# Patient Record
Sex: FEMALE | Race: WHITE | Employment: FULL TIME | URBAN - METROPOLITAN AREA
[De-identification: names, ages, dates, MRNs, and addresses within clinical notes are randomized per-mention and may not be internally consistent; named-entity substitution may affect disease eponyms.]

---

## 2017-03-22 ENCOUNTER — LAB ENCOUNTER (OUTPATIENT)
Dept: LAB | Age: 52
End: 2017-03-22
Attending: FAMILY MEDICINE
Payer: COMMERCIAL

## 2017-03-22 ENCOUNTER — OFFICE VISIT (OUTPATIENT)
Dept: FAMILY MEDICINE CLINIC | Facility: CLINIC | Age: 52
End: 2017-03-22

## 2017-03-22 VITALS
DIASTOLIC BLOOD PRESSURE: 86 MMHG | WEIGHT: 162 LBS | RESPIRATION RATE: 16 BRPM | HEART RATE: 64 BPM | SYSTOLIC BLOOD PRESSURE: 126 MMHG | BODY MASS INDEX: 25.43 KG/M2 | OXYGEN SATURATION: 99 % | TEMPERATURE: 98 F | HEIGHT: 67 IN

## 2017-03-22 DIAGNOSIS — Z00.00 ANNUAL PHYSICAL EXAM: ICD-10-CM

## 2017-03-22 DIAGNOSIS — R53.83 TIRED: ICD-10-CM

## 2017-03-22 DIAGNOSIS — Z00.00 ANNUAL PHYSICAL EXAM: Primary | ICD-10-CM

## 2017-03-22 DIAGNOSIS — F43.9 STRESS: ICD-10-CM

## 2017-03-22 DIAGNOSIS — E78.00 PURE HYPERCHOLESTEROLEMIA: ICD-10-CM

## 2017-03-22 DIAGNOSIS — Z12.11 COLON CANCER SCREENING: ICD-10-CM

## 2017-03-22 DIAGNOSIS — G47.00 INSOMNIA, UNSPECIFIED TYPE: ICD-10-CM

## 2017-03-22 LAB
25-HYDROXYVITAMIN D (TOTAL): 30.3 NG/ML (ref 30–100)
ALBUMIN SERPL-MCNC: 3.8 G/DL (ref 3.5–4.8)
ALP LIVER SERPL-CCNC: 45 U/L (ref 39–100)
ALT SERPL-CCNC: 15 U/L (ref 14–54)
AST SERPL-CCNC: 12 U/L (ref 15–41)
BASOPHILS # BLD AUTO: 0.06 X10(3) UL (ref 0–0.1)
BASOPHILS NFR BLD AUTO: 1.1 %
BILIRUB SERPL-MCNC: 1.1 MG/DL (ref 0.1–2)
BUN BLD-MCNC: 11 MG/DL (ref 8–20)
CALCIUM BLD-MCNC: 8.8 MG/DL (ref 8.3–10.3)
CHLORIDE: 104 MMOL/L (ref 101–111)
CHOLEST SMN-MCNC: 276 MG/DL (ref ?–200)
CO2: 29 MMOL/L (ref 22–32)
CREAT BLD-MCNC: 0.7 MG/DL (ref 0.55–1.02)
EOSINOPHIL # BLD AUTO: 0.14 X10(3) UL (ref 0–0.3)
EOSINOPHIL NFR BLD AUTO: 2.5 %
ERYTHROCYTE [DISTWIDTH] IN BLOOD BY AUTOMATED COUNT: 13.8 % (ref 11.5–16)
GLUCOSE BLD-MCNC: 88 MG/DL (ref 70–99)
HCT VFR BLD AUTO: 37.7 % (ref 34–50)
HDLC SERPL-MCNC: 70 MG/DL (ref 45–?)
HDLC SERPL: 3.94 {RATIO} (ref ?–4.44)
HGB BLD-MCNC: 12.4 G/DL (ref 12–16)
IMMATURE GRANULOCYTE COUNT: 0 X10(3) UL (ref 0–1)
IMMATURE GRANULOCYTE RATIO %: 0 %
LDLC SERPL CALC-MCNC: 193 MG/DL (ref ?–130)
LYMPHOCYTES # BLD AUTO: 1.44 X10(3) UL (ref 0.9–4)
LYMPHOCYTES NFR BLD AUTO: 25.7 %
M PROTEIN MFR SERPL ELPH: 7.5 G/DL (ref 6.1–8.3)
MCH RBC QN AUTO: 31.6 PG (ref 27–33.2)
MCHC RBC AUTO-ENTMCNC: 32.9 G/DL (ref 31–37)
MCV RBC AUTO: 96.2 FL (ref 81–100)
MONOCYTES # BLD AUTO: 0.59 X10(3) UL (ref 0.1–0.6)
MONOCYTES NFR BLD AUTO: 10.5 %
NEUTROPHIL ABS PRELIM: 3.38 X10 (3) UL (ref 1.3–6.7)
NEUTROPHILS # BLD AUTO: 3.38 X10(3) UL (ref 1.3–6.7)
NEUTROPHILS NFR BLD AUTO: 60.2 %
NONHDLC SERPL-MCNC: 206 MG/DL (ref ?–130)
PLATELET # BLD AUTO: 271 10(3)UL (ref 150–450)
POTASSIUM SERPL-SCNC: 4.5 MMOL/L (ref 3.6–5.1)
RBC # BLD AUTO: 3.92 X10(6)UL (ref 3.8–5.1)
RED CELL DISTRIBUTION WIDTH-SD: 48.6 FL (ref 35.1–46.3)
SODIUM SERPL-SCNC: 139 MMOL/L (ref 136–144)
TRIGLYCERIDES: 64 MG/DL (ref ?–150)
TSI SER-ACNC: 2.78 MIU/ML (ref 0.35–5.5)
VLDL: 13 MG/DL (ref 5–40)
WBC # BLD AUTO: 5.6 X10(3) UL (ref 4–13)

## 2017-03-22 PROCEDURE — 82306 VITAMIN D 25 HYDROXY: CPT

## 2017-03-22 PROCEDURE — 85025 COMPLETE CBC W/AUTO DIFF WBC: CPT

## 2017-03-22 PROCEDURE — 80053 COMPREHEN METABOLIC PANEL: CPT

## 2017-03-22 PROCEDURE — 80061 LIPID PANEL: CPT

## 2017-03-22 PROCEDURE — 84443 ASSAY THYROID STIM HORMONE: CPT

## 2017-03-22 PROCEDURE — 36415 COLL VENOUS BLD VENIPUNCTURE: CPT

## 2017-03-22 PROCEDURE — 99396 PREV VISIT EST AGE 40-64: CPT | Performed by: FAMILY MEDICINE

## 2017-03-22 NOTE — H&P
Juve Underwood is a 50year old female who presents for a well woman physical exam:       Patient complains of difficulty staying asleep, HLD, fatigue, and stress. Pt denied snoring. Denied depression.   Does not want to take any medication for all t pain  EXT: denies LE edema  NEURO: denies frequent headaches or dizziness  PSYCH: denies change in mood    EXAM:   /86 mmHg  Pulse 64  Temp(Src) 98 °F (36.7 °C) (Temporal)  Resp 16  Ht 67\"  Wt 162 lb  BMI 25.37 kg/m2  SpO2 99%  Body mass index is 25

## 2017-05-09 PROCEDURE — 88175 CYTOPATH C/V AUTO FLUID REDO: CPT | Performed by: OBSTETRICS & GYNECOLOGY

## 2017-05-09 PROCEDURE — 87624 HPV HI-RISK TYP POOLED RSLT: CPT | Performed by: OBSTETRICS & GYNECOLOGY

## 2018-02-01 ENCOUNTER — LAB ENCOUNTER (OUTPATIENT)
Dept: LAB | Age: 53
End: 2018-02-01
Attending: FAMILY MEDICINE
Payer: COMMERCIAL

## 2018-02-01 DIAGNOSIS — Z00.00 LABORATORY EXAMINATION ORDERED AS PART OF A COMPLETE PHYSICAL EXAMINATION: ICD-10-CM

## 2018-02-01 DIAGNOSIS — E53.8 B12 DEFICIENCY: ICD-10-CM

## 2018-02-01 LAB
25-HYDROXYVITAMIN D (TOTAL): 44.2 NG/ML (ref 30–100)
ALBUMIN SERPL-MCNC: 3.9 G/DL (ref 3.5–4.8)
ALP LIVER SERPL-CCNC: 49 U/L (ref 39–100)
ALT SERPL-CCNC: 16 U/L (ref 14–54)
AST SERPL-CCNC: 11 U/L (ref 15–41)
BASOPHILS # BLD AUTO: 0.06 X10(3) UL (ref 0–0.1)
BASOPHILS NFR BLD AUTO: 0.8 %
BILIRUB SERPL-MCNC: 0.8 MG/DL (ref 0.1–2)
BUN BLD-MCNC: 11 MG/DL (ref 8–20)
CALCIUM BLD-MCNC: 8.6 MG/DL (ref 8.3–10.3)
CHLORIDE: 104 MMOL/L (ref 101–111)
CHOLEST SMN-MCNC: 225 MG/DL (ref ?–200)
CO2: 28 MMOL/L (ref 22–32)
CREAT BLD-MCNC: 0.71 MG/DL (ref 0.55–1.02)
DEPRECATED HBV CORE AB SER IA-ACNC: 13.1 NG/ML (ref 10–291)
EOSINOPHIL # BLD AUTO: 0.1 X10(3) UL (ref 0–0.3)
EOSINOPHIL NFR BLD AUTO: 1.3 %
ERYTHROCYTE [DISTWIDTH] IN BLOOD BY AUTOMATED COUNT: 13.6 % (ref 11.5–16)
GLUCOSE BLD-MCNC: 96 MG/DL (ref 70–99)
HAV AB SERPL IA-ACNC: 1138 PG/ML (ref 193–986)
HCT VFR BLD AUTO: 38.7 % (ref 34–50)
HDLC SERPL-MCNC: 75 MG/DL (ref 45–?)
HDLC SERPL: 3 {RATIO} (ref ?–4.44)
HGB BLD-MCNC: 12.9 G/DL (ref 12–16)
IMMATURE GRANULOCYTE COUNT: 0.02 X10(3) UL (ref 0–1)
IMMATURE GRANULOCYTE RATIO %: 0.3 %
LDLC SERPL CALC-MCNC: 135 MG/DL (ref ?–130)
LYMPHOCYTES # BLD AUTO: 1.38 X10(3) UL (ref 0.9–4)
LYMPHOCYTES NFR BLD AUTO: 17.4 %
M PROTEIN MFR SERPL ELPH: 7.8 G/DL (ref 6.1–8.3)
MCH RBC QN AUTO: 30.9 PG (ref 27–33.2)
MCHC RBC AUTO-ENTMCNC: 33.3 G/DL (ref 31–37)
MCV RBC AUTO: 92.8 FL (ref 81–100)
MONOCYTES # BLD AUTO: 0.7 X10(3) UL (ref 0.1–0.6)
MONOCYTES NFR BLD AUTO: 8.8 %
NEUTROPHIL ABS PRELIM: 5.69 X10 (3) UL (ref 1.3–6.7)
NEUTROPHILS # BLD AUTO: 5.69 X10(3) UL (ref 1.3–6.7)
NEUTROPHILS NFR BLD AUTO: 71.4 %
NONHDLC SERPL-MCNC: 150 MG/DL (ref ?–130)
PLATELET # BLD AUTO: 288 10(3)UL (ref 150–450)
POTASSIUM SERPL-SCNC: 4 MMOL/L (ref 3.6–5.1)
RBC # BLD AUTO: 4.17 X10(6)UL (ref 3.8–5.1)
RED CELL DISTRIBUTION WIDTH-SD: 46.1 FL (ref 35.1–46.3)
SODIUM SERPL-SCNC: 139 MMOL/L (ref 136–144)
TRIGL SERPL-MCNC: 73 MG/DL (ref ?–150)
TSI SER-ACNC: 2.28 MIU/ML (ref 0.35–5.5)
VLDLC SERPL CALC-MCNC: 15 MG/DL (ref 5–40)
WBC # BLD AUTO: 8 X10(3) UL (ref 4–13)

## 2018-02-01 PROCEDURE — 80053 COMPREHEN METABOLIC PANEL: CPT

## 2018-02-01 PROCEDURE — 85025 COMPLETE CBC W/AUTO DIFF WBC: CPT

## 2018-02-01 PROCEDURE — 82306 VITAMIN D 25 HYDROXY: CPT

## 2018-02-01 PROCEDURE — 82728 ASSAY OF FERRITIN: CPT

## 2018-02-01 PROCEDURE — 84443 ASSAY THYROID STIM HORMONE: CPT

## 2018-02-01 PROCEDURE — 36415 COLL VENOUS BLD VENIPUNCTURE: CPT

## 2018-02-01 PROCEDURE — 82607 VITAMIN B-12: CPT

## 2018-02-01 PROCEDURE — 80061 LIPID PANEL: CPT

## 2018-02-01 NOTE — PROGRESS NOTES
HPI:    Patient ID: Caitlin Harrison is a 50year old female. Anxiety   This is a chronic problem. The current episode started more than 1 month ago. The problem occurs daily. The problem has been unchanged.  Associated symptoms comments: Less motivat PLATELET; Future  - COMP METABOLIC PANEL (14); Future  - TSH W REFLEX TO FREE T4; Future  - VITAMIN D, 25-HYDROXY; Future  - FERRITIN; Future  - VITAMIN B12; Future    3.  B12 deficiency  - VITAMIN B12; Future        Orders Placed This Encounter      Lipid

## 2018-05-30 PROBLEM — D50.0 IRON DEFICIENCY ANEMIA DUE TO CHRONIC BLOOD LOSS: Status: ACTIVE | Noted: 2018-05-30

## 2018-05-30 PROBLEM — N92.4 PERIMENOPAUSAL MENORRHAGIA: Status: ACTIVE | Noted: 2018-05-30

## 2018-05-30 PROBLEM — N93.8 DYSFUNCTIONAL UTERINE BLEEDING: Status: ACTIVE | Noted: 2018-05-30

## 2018-05-31 RX ORDER — ASCORBIC ACID 500 MG
500 TABLET ORAL 2 TIMES DAILY
COMMUNITY
End: 2021-06-09

## 2018-05-31 RX ORDER — MELATONIN
325 2 TIMES DAILY WITH MEALS
COMMUNITY
End: 2019-05-21

## 2018-06-04 ENCOUNTER — HOSPITAL ENCOUNTER (OUTPATIENT)
Facility: HOSPITAL | Age: 53
Setting detail: HOSPITAL OUTPATIENT SURGERY
Discharge: HOME | End: 2018-06-04
Attending: OBSTETRICS & GYNECOLOGY | Admitting: OBSTETRICS & GYNECOLOGY
Payer: COMMERCIAL

## 2018-06-04 ENCOUNTER — SURGERY (OUTPATIENT)
Age: 53
End: 2018-06-04

## 2018-06-04 VITALS
BODY MASS INDEX: 22.7 KG/M2 | SYSTOLIC BLOOD PRESSURE: 119 MMHG | DIASTOLIC BLOOD PRESSURE: 80 MMHG | WEIGHT: 144.63 LBS | RESPIRATION RATE: 16 BRPM | HEART RATE: 59 BPM | OXYGEN SATURATION: 100 % | TEMPERATURE: 98 F | HEIGHT: 67 IN

## 2018-06-04 DIAGNOSIS — N93.8 DYSFUNCTIONAL UTERINE BLEEDING: ICD-10-CM

## 2018-06-04 DIAGNOSIS — D50.0 IRON DEFICIENCY ANEMIA DUE TO CHRONIC BLOOD LOSS: ICD-10-CM

## 2018-06-04 DIAGNOSIS — N92.4 EXCESSIVE BLEEDING IN PREMENOPAUSAL PERIOD: ICD-10-CM

## 2018-06-04 DIAGNOSIS — N92.4 PERIMENOPAUSAL MENORRHAGIA: ICD-10-CM

## 2018-06-04 DIAGNOSIS — D64.9 ANEMIA: Primary | ICD-10-CM

## 2018-06-04 PROCEDURE — 0UDB7ZZ EXTRACTION OF ENDOMETRIUM, VIA NATURAL OR ARTIFICIAL OPENING: ICD-10-PCS | Performed by: OBSTETRICS & GYNECOLOGY

## 2018-06-04 PROCEDURE — 88305 TISSUE EXAM BY PATHOLOGIST: CPT | Performed by: OBSTETRICS & GYNECOLOGY

## 2018-06-04 PROCEDURE — 81025 URINE PREGNANCY TEST: CPT | Performed by: OBSTETRICS & GYNECOLOGY

## 2018-06-04 RX ORDER — ONDANSETRON 2 MG/ML
4 INJECTION INTRAMUSCULAR; INTRAVENOUS AS NEEDED
Status: DISCONTINUED | OUTPATIENT
Start: 2018-06-04 | End: 2018-06-04

## 2018-06-04 RX ORDER — ACETAMINOPHEN 500 MG
1000 TABLET ORAL EVERY 6 HOURS PRN
COMMUNITY
End: 2018-07-03

## 2018-06-04 RX ORDER — SODIUM CHLORIDE, SODIUM LACTATE, POTASSIUM CHLORIDE, CALCIUM CHLORIDE 600; 310; 30; 20 MG/100ML; MG/100ML; MG/100ML; MG/100ML
INJECTION, SOLUTION INTRAVENOUS CONTINUOUS
Status: DISCONTINUED | OUTPATIENT
Start: 2018-06-04 | End: 2018-06-04

## 2018-06-04 RX ORDER — MEPERIDINE HYDROCHLORIDE 25 MG/ML
12.5 INJECTION INTRAMUSCULAR; INTRAVENOUS; SUBCUTANEOUS AS NEEDED
Status: DISCONTINUED | OUTPATIENT
Start: 2018-06-04 | End: 2018-06-04

## 2018-06-04 RX ORDER — NALOXONE HYDROCHLORIDE 0.4 MG/ML
80 INJECTION, SOLUTION INTRAMUSCULAR; INTRAVENOUS; SUBCUTANEOUS AS NEEDED
Status: DISCONTINUED | OUTPATIENT
Start: 2018-06-04 | End: 2018-06-04

## 2018-06-04 RX ORDER — ACETAMINOPHEN 500 MG
1000 TABLET ORAL ONCE AS NEEDED
Status: DISCONTINUED | OUTPATIENT
Start: 2018-06-04 | End: 2018-06-04

## 2018-06-04 RX ORDER — IBUPROFEN 600 MG/1
600 TABLET ORAL EVERY 8 HOURS PRN
Qty: 10 TABLET | Refills: 0 | Status: SHIPPED | OUTPATIENT
Start: 2018-06-04 | End: 2019-09-12 | Stop reason: ALTCHOICE

## 2018-06-04 RX ORDER — ACETAMINOPHEN 500 MG
1000 TABLET ORAL ONCE
Status: DISCONTINUED | OUTPATIENT
Start: 2018-06-04 | End: 2018-06-04 | Stop reason: HOSPADM

## 2018-06-04 RX ORDER — METOCLOPRAMIDE HYDROCHLORIDE 5 MG/ML
10 INJECTION INTRAMUSCULAR; INTRAVENOUS AS NEEDED
Status: DISCONTINUED | OUTPATIENT
Start: 2018-06-04 | End: 2018-06-04

## 2018-06-04 RX ORDER — IBUPROFEN 200 MG
600 TABLET ORAL ONCE AS NEEDED
Status: DISCONTINUED | OUTPATIENT
Start: 2018-06-04 | End: 2018-06-04

## 2018-06-04 RX ORDER — HYDROMORPHONE HYDROCHLORIDE 1 MG/ML
0.4 INJECTION, SOLUTION INTRAMUSCULAR; INTRAVENOUS; SUBCUTANEOUS EVERY 5 MIN PRN
Status: DISCONTINUED | OUTPATIENT
Start: 2018-06-04 | End: 2018-06-04

## 2018-06-04 RX ORDER — MIDAZOLAM HYDROCHLORIDE 1 MG/ML
1 INJECTION INTRAMUSCULAR; INTRAVENOUS EVERY 5 MIN PRN
Status: DISCONTINUED | OUTPATIENT
Start: 2018-06-04 | End: 2018-06-04

## 2018-06-04 NOTE — BRIEF OP NOTE
Pre-Operative Diagnosis: Excessive bleeding in premenopausal period [N92.4]     Post-Operative Diagnosis: Excessive bleeding in premenopausal period [N92.4]      Procedure Performed:   Procedure(s):  DILATION AND CURETTAGE,    Surgeon(s) and Role:     * Je

## 2018-06-04 NOTE — H&P
Valeria Basurto is a 48year old  who presents for bleeding that started  - had missed a month - bleeding ever since - started the provera on 21 - 10 mg - there bleeding finally started to decrease yesterday - changing tampon every couople denies depression or anxiety, mood is good  HEMATOLOGIC: denies history of anemia  ENDOCRINE: denies thyroid history, cold/heat intolerance  ALLERGIC: denies allergy symptoms     EXAM:      VITALS: /68   Pulse 72   Ht 5' 7\" (1.702 m)   Wt 143 lb 12.

## 2018-06-04 NOTE — OR NURSING
Dr. Sybil Cochran did not respond to page. Spoke with Idania Rowe in Dr. Talbert Devoid office regarding Ibuprofen RX that was not on chart for pt.   Idania Rowe spoke with RN in office who was going to phone in [employer] but pt refused and said she just wants to take over the counter ib

## 2018-06-04 NOTE — OPERATIVE REPORT
[address]    PATIENT'S NAME: Tian Herring   ATTENDING PHYSICIAN: Leila Burr M.D. OPERATING PHYSICIAN: Leila Burr M.D.    PATIENT ACCOUNT#:   [de-identified]    LOCATION:  [address] [place] [address] [address] #:   [id number]

## 2019-03-12 ENCOUNTER — APPOINTMENT (OUTPATIENT)
Dept: CT IMAGING | Age: 54
End: 2019-03-12
Attending: EMERGENCY MEDICINE
Payer: COMMERCIAL

## 2019-03-12 ENCOUNTER — HOSPITAL ENCOUNTER (OUTPATIENT)
Age: 54
Discharge: HOME | End: 2019-03-12
Attending: EMERGENCY MEDICINE
Payer: COMMERCIAL

## 2019-03-12 VITALS
SYSTOLIC BLOOD PRESSURE: 131 MMHG | TEMPERATURE: 99 F | RESPIRATION RATE: 16 BRPM | DIASTOLIC BLOOD PRESSURE: 81 MMHG | HEART RATE: 57 BPM | OXYGEN SATURATION: 100 %

## 2019-03-12 DIAGNOSIS — G44.209 TENSION HEADACHE: Primary | ICD-10-CM

## 2019-03-12 LAB
#MXD IC: 0.9 X10ˆ3/UL (ref 0.1–1)
HCT VFR BLD AUTO: 40 % (ref 35–48)
HGB BLD-MCNC: 13.6 G/DL (ref 12–16)
LYMPHOCYTES # BLD AUTO: 1.7 X10ˆ3/UL (ref 1–4)
LYMPHOCYTES NFR BLD AUTO: 18.3 %
MCH RBC QN AUTO: 32 PG (ref 26–34)
MCHC RBC AUTO-ENTMCNC: 34 G/DL (ref 31–37)
MCV RBC AUTO: 94.1 FL (ref 80–100)
MIXED CELL %: 9.9 %
NEUTROPHILS # BLD AUTO: 6.7 X10ˆ3/UL (ref 1.5–7.7)
NEUTROPHILS NFR BLD AUTO: 71.8 %
PLATELET # BLD AUTO: 269 X10ˆ3/UL (ref 150–450)
POCT BILIRUBIN URINE: NEGATIVE
POCT BLOOD URINE: NEGATIVE
POCT GLUCOSE URINE: NEGATIVE MG/DL
POCT KETONE URINE: NEGATIVE MG/DL
POCT LEUKOCYTE ESTERASE URINE: NEGATIVE
POCT NITRITE URINE: NEGATIVE
POCT PH URINE: 6 (ref 5–8)
POCT PROTEIN URINE: NEGATIVE MG/DL
POCT SPECIFIC GRAVITY URINE: 1.01
POCT URINE CLARITY: CLEAR
POCT URINE COLOR: YELLOW
POCT URINE PREGNANCY: NEGATIVE
POCT UROBILINOGEN URINE: 0.2 MG/DL
RBC # BLD AUTO: 4.25 X10ˆ6/UL (ref 3.8–5.3)
WBC # BLD AUTO: 9.3 X10ˆ3/UL (ref 4–11)

## 2019-03-12 PROCEDURE — 81002 URINALYSIS NONAUTO W/O SCOPE: CPT | Performed by: EMERGENCY MEDICINE

## 2019-03-12 PROCEDURE — 99214 OFFICE O/P EST MOD 30 MIN: CPT

## 2019-03-12 PROCEDURE — 85025 COMPLETE CBC W/AUTO DIFF WBC: CPT | Performed by: EMERGENCY MEDICINE

## 2019-03-12 PROCEDURE — 81025 URINE PREGNANCY TEST: CPT | Performed by: EMERGENCY MEDICINE

## 2019-03-12 PROCEDURE — 70450 CT HEAD/BRAIN W/O DYE: CPT | Performed by: EMERGENCY MEDICINE

## 2019-03-12 PROCEDURE — 99204 OFFICE O/P NEW MOD 45 MIN: CPT

## 2019-03-12 RX ORDER — BUTALBITAL, ACETAMINOPHEN AND CAFFEINE 50; 325; 40 MG/1; MG/1; MG/1
1-2 TABLET ORAL EVERY 6 HOURS PRN
Qty: 10 TABLET | Refills: 0 | Status: SHIPPED | OUTPATIENT
Start: 2019-03-12 | End: 2019-03-19

## 2019-03-12 RX ORDER — BUTALBITAL, ACETAMINOPHEN AND CAFFEINE 50; 325; 40 MG/1; MG/1; MG/1
1-2 TABLET ORAL EVERY 4 HOURS PRN
Qty: 10 TABLET | Refills: 0 | Status: SHIPPED | OUTPATIENT
Start: 2019-03-12 | End: 2019-05-31

## 2019-03-12 NOTE — ED PROVIDER NOTES
Patient Seen in: [facility]    History   Patient presents with:  Headache (neurologic)    Stated Complaint: migraines     HPI    Headaches for the past few weeks, pretty consistently for the past few weeks, with varying intensit department bed she is in no acute distress her HEENT exam is within normal limits and her neck is supple her lungs are clear to auscultation her heart has regular rate and rhythm.   Her pupils are equal round and reactive to light her facial symmetry is nor [id number]  [phone]              Medications Prescribed:  Current Discharge Medication List    START taking these medications    !! Butalbital-APAP-Caffeine -40 MG Oral Tab  Take 1-2 tablets by mouth every 4 (four) hours as needed for Pain.   Qty: 10

## 2019-05-21 ENCOUNTER — OFFICE VISIT (OUTPATIENT)
Dept: FAMILY MEDICINE CLINIC | Facility: CLINIC | Age: 54
End: 2019-05-21
Payer: COMMERCIAL

## 2019-05-21 ENCOUNTER — APPOINTMENT (OUTPATIENT)
Dept: LAB | Age: 54
End: 2019-05-21
Attending: FAMILY MEDICINE
Payer: COMMERCIAL

## 2019-05-21 VITALS
HEART RATE: 74 BPM | OXYGEN SATURATION: 98 % | HEIGHT: 67 IN | DIASTOLIC BLOOD PRESSURE: 72 MMHG | RESPIRATION RATE: 20 BRPM | SYSTOLIC BLOOD PRESSURE: 110 MMHG | BODY MASS INDEX: 25.27 KG/M2 | WEIGHT: 161 LBS | TEMPERATURE: 98 F

## 2019-05-21 DIAGNOSIS — R06.00 EXERTIONAL DYSPNEA: ICD-10-CM

## 2019-05-21 DIAGNOSIS — R07.89 ATYPICAL CHEST PAIN: ICD-10-CM

## 2019-05-21 DIAGNOSIS — E61.1 IRON DEFICIENCY: ICD-10-CM

## 2019-05-21 DIAGNOSIS — E55.9 VITAMIN D DEFICIENCY: Primary | ICD-10-CM

## 2019-05-21 DIAGNOSIS — Z00.00 ANNUAL PHYSICAL EXAM: ICD-10-CM

## 2019-05-21 DIAGNOSIS — F32.A DEPRESSION, UNSPECIFIED DEPRESSION TYPE: ICD-10-CM

## 2019-05-21 PROCEDURE — 93005 ELECTROCARDIOGRAM TRACING: CPT

## 2019-05-21 PROCEDURE — 99212 OFFICE O/P EST SF 10 MIN: CPT | Performed by: FAMILY MEDICINE

## 2019-05-21 PROCEDURE — 99396 PREV VISIT EST AGE 40-64: CPT | Performed by: FAMILY MEDICINE

## 2019-05-21 PROCEDURE — 93010 ELECTROCARDIOGRAM REPORT: CPT | Performed by: INTERNAL MEDICINE

## 2019-05-21 NOTE — H&P
Tammy Robles is a 48year old female who presents for a well woman physical exam:     Headache    This is a chronic problem. The current episode started more than 1 month ago.  The problem occurs intermittently (pt states she gets them 2x per week and th tablet Rfl: 0   Vitamin C 500 MG Oral Tab Take 500 mg by mouth 2 (two) times daily.  Disp:  Rfl:       Past Medical History:   Diagnosis Date   • Blood disorder     ANEMIC   • Visual impairment     CONTACTS      Past Surgical History:   Procedure Laterality dyspareunia; denies dryness  MS: denies back pain  EXT: denies LE edema  NEURO: denies frequent headaches or dizziness  PSYCH: denies change in mood    EXAM:   /72   Pulse 74   Temp 97.7 °F (36.5 °C) (Other)   Resp 20   Ht 67\"   Wt 161 lb   SpO2 98%

## 2019-05-25 ENCOUNTER — LAB ENCOUNTER (OUTPATIENT)
Dept: LAB | Age: 54
End: 2019-05-25
Attending: FAMILY MEDICINE
Payer: COMMERCIAL

## 2019-05-25 DIAGNOSIS — D64.9 ANEMIA: ICD-10-CM

## 2019-05-25 DIAGNOSIS — N93.8 DYSFUNCTIONAL UTERINE BLEEDING: ICD-10-CM

## 2019-05-25 DIAGNOSIS — Z00.00 ANNUAL PHYSICAL EXAM: ICD-10-CM

## 2019-05-25 DIAGNOSIS — N92.4 PERIMENOPAUSAL MENORRHAGIA: ICD-10-CM

## 2019-05-25 DIAGNOSIS — N92.4 EXCESSIVE BLEEDING IN PREMENOPAUSAL PERIOD: ICD-10-CM

## 2019-05-25 DIAGNOSIS — E61.1 IRON DEFICIENCY: ICD-10-CM

## 2019-05-25 DIAGNOSIS — D50.0 IRON DEFICIENCY ANEMIA DUE TO CHRONIC BLOOD LOSS: ICD-10-CM

## 2019-05-25 PROCEDURE — 36415 COLL VENOUS BLD VENIPUNCTURE: CPT

## 2019-05-25 PROCEDURE — 80061 LIPID PANEL: CPT

## 2019-05-25 PROCEDURE — 82306 VITAMIN D 25 HYDROXY: CPT

## 2019-05-25 PROCEDURE — 82728 ASSAY OF FERRITIN: CPT

## 2019-05-25 PROCEDURE — 85025 COMPLETE CBC W/AUTO DIFF WBC: CPT

## 2019-05-25 PROCEDURE — 80053 COMPREHEN METABOLIC PANEL: CPT

## 2019-05-25 PROCEDURE — 84443 ASSAY THYROID STIM HORMONE: CPT

## 2019-05-28 ENCOUNTER — HOSPITAL ENCOUNTER (OUTPATIENT)
Dept: CV DIAGNOSTICS | Age: 54
Discharge: HOME | End: 2019-05-28
Attending: FAMILY MEDICINE
Payer: COMMERCIAL

## 2019-05-28 DIAGNOSIS — R07.89 ATYPICAL CHEST PAIN: ICD-10-CM

## 2019-05-28 DIAGNOSIS — R06.00 EXERTIONAL DYSPNEA: ICD-10-CM

## 2019-05-28 PROCEDURE — 93017 CV STRESS TEST TRACING ONLY: CPT | Performed by: FAMILY MEDICINE

## 2019-05-28 PROCEDURE — 93018 CV STRESS TEST I&R ONLY: CPT | Performed by: FAMILY MEDICINE

## 2019-05-29 ENCOUNTER — TELEPHONE (OUTPATIENT)
Dept: FAMILY MEDICINE CLINIC | Facility: CLINIC | Age: 54
End: 2019-05-29

## 2019-05-29 DIAGNOSIS — E78.5 HYPERLIPIDEMIA, UNSPECIFIED HYPERLIPIDEMIA TYPE: Primary | ICD-10-CM

## 2019-05-29 NOTE — TELEPHONE ENCOUNTER
Blood test showing really high cholesterol. We can consider starting medication or she redouble efforts with diet, exercise, fiber and then we recheck in 3 months. Stress test results not back yet.

## 2019-05-29 NOTE — TELEPHONE ENCOUNTER
Called patient - verified patient's name and  - informed pt of doctor's note - patient verbalized understanding, states she has colonoscopy on Friday and needs to be cleared. Called Cardiac testing and they will enter stress echo's report.     Please

## 2019-05-29 NOTE — TELEPHONE ENCOUNTER
Notes recorded by Lisa Stoddard RN on 5/29/2019 at 2:34 PM CDT  Pt informed . Dr Jj Elmore office also notified. Copy of stress test faxed to [phone].  ------    Notes recorded by Zeus Rivas DO on 5/29/2019 at 2:17 PM CDT  Looks normal.  Cleared f

## 2019-05-29 NOTE — TELEPHONE ENCOUNTER
Pt looking for results of labs and stress test.RN informed pt stress test has not been resulted. Please advise on labs.

## 2019-05-30 ENCOUNTER — TELEPHONE (OUTPATIENT)
Dept: FAMILY MEDICINE CLINIC | Facility: CLINIC | Age: 54
End: 2019-05-30

## 2019-05-30 NOTE — TELEPHONE ENCOUNTER
Pt wants to know what she can take for a migraine - she's having a colonoscopy tomorrow and concerned about taking medications

## 2019-05-31 NOTE — TELEPHONE ENCOUNTER
Seems too late now, but she can take the butalbital she was given earlier later tonight. She can not take it 12 hrs before the colonscopy or 6 hrs after.   Does she need another px?

## 2019-05-31 NOTE — TELEPHONE ENCOUNTER
Pt informed. States she had to cancel colonoscopy. Pt only has 1 Butalbital left and would like a refill. Please advise. If ok phone into [facility] [address].

## 2019-06-03 RX ORDER — BUTALBITAL, ACETAMINOPHEN AND CAFFEINE 50; 325; 40 MG/1; MG/1; MG/1
1-2 TABLET ORAL EVERY 4 HOURS PRN
Qty: 20 TABLET | Refills: 0 | Status: SHIPPED | OUTPATIENT
Start: 2019-06-03 | End: 2019-06-10

## 2019-07-24 RX ORDER — BUTALBITAL, ACETAMINOPHEN AND CAFFEINE 50; 325; 40 MG/1; MG/1; MG/1
TABLET ORAL
Qty: 20 TABLET | Refills: 0 | Status: SHIPPED | OUTPATIENT
Start: 2019-07-24 | End: 2019-08-22

## 2019-08-24 ENCOUNTER — TELEPHONE (OUTPATIENT)
Dept: FAMILY MEDICINE CLINIC | Facility: CLINIC | Age: 54
End: 2019-08-24

## 2019-08-24 RX ORDER — BUTALBITAL, ACETAMINOPHEN AND CAFFEINE 50; 325; 40 MG/1; MG/1; MG/1
TABLET ORAL
Qty: 20 TABLET | Refills: 0 | Status: SHIPPED | OUTPATIENT
Start: 2019-08-24 | End: 2019-09-27

## 2019-08-24 NOTE — TELEPHONE ENCOUNTER
Spoke with PT about refilling her medication. She is out of town in [address] and [facility].  I am calling the med into

## 2019-08-26 ENCOUNTER — TELEPHONE (OUTPATIENT)
Dept: FAMILY MEDICINE CLINIC | Facility: CLINIC | Age: 54
End: 2019-08-26

## 2019-09-12 ENCOUNTER — OFFICE VISIT (OUTPATIENT)
Dept: FAMILY MEDICINE CLINIC | Facility: CLINIC | Age: 54
End: 2019-09-12
Payer: COMMERCIAL

## 2019-09-12 VITALS
WEIGHT: 161 LBS | OXYGEN SATURATION: 98 % | HEIGHT: 67 IN | DIASTOLIC BLOOD PRESSURE: 78 MMHG | RESPIRATION RATE: 16 BRPM | TEMPERATURE: 99 F | SYSTOLIC BLOOD PRESSURE: 124 MMHG | BODY MASS INDEX: 25.27 KG/M2 | HEART RATE: 86 BPM

## 2019-09-12 DIAGNOSIS — J01.00 ACUTE NON-RECURRENT MAXILLARY SINUSITIS: Primary | ICD-10-CM

## 2019-09-12 PROCEDURE — 99213 OFFICE O/P EST LOW 20 MIN: CPT | Performed by: FAMILY MEDICINE

## 2019-09-12 RX ORDER — AZITHROMYCIN 250 MG/1
TABLET, FILM COATED ORAL
Qty: 6 TABLET | Refills: 0 | Status: SHIPPED | OUTPATIENT
Start: 2019-09-12 | End: 2019-11-08 | Stop reason: ALTCHOICE

## 2019-09-12 NOTE — PROGRESS NOTES
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 3-1/2 days ago developed generalized bony aches and pains and pressure in her maxillary sinuses. She is a non-smoker. Denies chest pain shortness of breath leg edema.   Has been trying some Sudafed with minimal

## 2019-10-02 RX ORDER — BUTALBITAL, ACETAMINOPHEN AND CAFFEINE 50; 325; 40 MG/1; MG/1; MG/1
TABLET ORAL
Qty: 20 TABLET | Refills: 0 | Status: SHIPPED | OUTPATIENT
Start: 2019-10-02 | End: 2019-11-08

## 2019-10-02 RX ORDER — BUTALBITAL, ACETAMINOPHEN AND CAFFEINE 50; 325; 40 MG/1; MG/1; MG/1
TABLET ORAL
Qty: 20 TABLET | OUTPATIENT
Start: 2019-10-02

## 2019-11-08 ENCOUNTER — OFFICE VISIT (OUTPATIENT)
Dept: FAMILY MEDICINE CLINIC | Facility: CLINIC | Age: 54
End: 2019-11-08
Payer: COMMERCIAL

## 2019-11-08 VITALS
HEART RATE: 73 BPM | OXYGEN SATURATION: 98 % | WEIGHT: 161.81 LBS | TEMPERATURE: 98 F | DIASTOLIC BLOOD PRESSURE: 80 MMHG | BODY MASS INDEX: 25.4 KG/M2 | HEIGHT: 67 IN | RESPIRATION RATE: 12 BRPM | SYSTOLIC BLOOD PRESSURE: 110 MMHG

## 2019-11-08 DIAGNOSIS — G44.229 CHRONIC TENSION-TYPE HEADACHE, NOT INTRACTABLE: Primary | ICD-10-CM

## 2019-11-08 DIAGNOSIS — F33.8 SEASONAL DEPRESSION (HCC): ICD-10-CM

## 2019-11-08 PROCEDURE — 99214 OFFICE O/P EST MOD 30 MIN: CPT | Performed by: PHYSICIAN ASSISTANT

## 2019-11-08 RX ORDER — BUPROPION HYDROCHLORIDE 150 MG/1
150 TABLET ORAL DAILY
Qty: 30 TABLET | Refills: 2 | Status: SHIPPED | OUTPATIENT
Start: 2019-11-08 | End: 2019-12-09

## 2019-11-08 RX ORDER — BUTALBITAL, ACETAMINOPHEN AND CAFFEINE 50; 325; 40 MG/1; MG/1; MG/1
TABLET ORAL
Qty: 30 TABLET | Refills: 0 | Status: SHIPPED | OUTPATIENT
Start: 2019-11-08 | End: 2019-12-09

## 2019-11-08 NOTE — PROGRESS NOTES
HPI:    Patient ID: Linda Mathur is a 48year old female. HPI   Patient with history of chronic headaches presents with recent flareup of symptoms. States in the last week her headaches have been worse in severity and more frequent.   Pain is located for myalgias, joint pain and gait problem. Skin: Negative for rash. Neurological: Positive for headaches. Negative for weakness and light-headedness. Hematological: Negative for adenopathy. Psychiatric/Behavioral: Positive for depressed mood.  The p Her mood appears depressed. Intermittently tearful on exam.              ASSESSMENT/PLAN:   1. Chronic tension-type headache, not intractable  Recent increase in the severity and frequency of her tension headache.   Likely triggered by stress with her new Referrals:  None         ID#[id number]

## 2019-11-15 ENCOUNTER — TELEPHONE (OUTPATIENT)
Dept: FAMILY MEDICINE CLINIC | Facility: CLINIC | Age: 54
End: 2019-11-15

## 2019-11-15 NOTE — TELEPHONE ENCOUNTER
Needs Sertraline refilled. Johnny    She stated William Gross said she would refill as she saw her on Friday. Needs this today!

## 2019-12-09 RX ORDER — BUPROPION HYDROCHLORIDE 150 MG/1
150 TABLET ORAL DAILY
Qty: 30 TABLET | Refills: 0 | Status: SHIPPED | OUTPATIENT
Start: 2019-12-09 | End: 2019-12-30

## 2019-12-09 RX ORDER — BUTALBITAL, ACETAMINOPHEN AND CAFFEINE 50; 325; 40 MG/1; MG/1; MG/1
TABLET ORAL
Qty: 30 TABLET | Refills: 0 | Status: SHIPPED | OUTPATIENT
Start: 2019-12-09 | End: 2020-03-20

## 2019-12-30 ENCOUNTER — OFFICE VISIT (OUTPATIENT)
Dept: FAMILY MEDICINE CLINIC | Facility: CLINIC | Age: 54
End: 2019-12-30
Payer: COMMERCIAL

## 2019-12-30 VITALS
HEIGHT: 67 IN | WEIGHT: 159 LBS | TEMPERATURE: 97 F | HEART RATE: 75 BPM | DIASTOLIC BLOOD PRESSURE: 72 MMHG | OXYGEN SATURATION: 96 % | RESPIRATION RATE: 16 BRPM | BODY MASS INDEX: 24.96 KG/M2 | SYSTOLIC BLOOD PRESSURE: 116 MMHG

## 2019-12-30 DIAGNOSIS — L98.9 SKIN LESION: ICD-10-CM

## 2019-12-30 DIAGNOSIS — Z12.11 COLON CANCER SCREENING: ICD-10-CM

## 2019-12-30 DIAGNOSIS — G44.229 CHRONIC TENSION-TYPE HEADACHE, NOT INTRACTABLE: Primary | ICD-10-CM

## 2019-12-30 DIAGNOSIS — M62.838 MUSCLE SPASM: ICD-10-CM

## 2019-12-30 DIAGNOSIS — F33.8 SEASONAL DEPRESSION (HCC): ICD-10-CM

## 2019-12-30 PROCEDURE — 99214 OFFICE O/P EST MOD 30 MIN: CPT | Performed by: PHYSICIAN ASSISTANT

## 2019-12-30 RX ORDER — DICLOFENAC SODIUM 75 MG/1
75 TABLET, DELAYED RELEASE ORAL 3 TIMES DAILY
Qty: 30 TABLET | Refills: 0 | Status: SHIPPED | OUTPATIENT
Start: 2019-12-30 | End: 2020-03-20

## 2019-12-30 RX ORDER — BUPROPION HYDROCHLORIDE 150 MG/1
150 TABLET ORAL DAILY
Qty: 30 TABLET | Refills: 1 | Status: SHIPPED | OUTPATIENT
Start: 2019-12-30 | End: 2020-03-05

## 2019-12-30 NOTE — PROGRESS NOTES
HPI:    Patient ID: Stan Baker is a 48year old female. HPI   Patient presents today for follow-up of headaches and seasonal depression.   She was last seen in the office on 11/8/2019 and started on Wellbutrin  mg and given Fioricet as needed Itchy mole left inner thigh   Neurological: Positive for headaches. Negative for weakness and light-headedness. Hematological: Negative for adenopathy. Psychiatric/Behavioral: Positive for depressed mood. The patient is not nervous/anxious. She has no cervical adenopathy. Neurological: She is alert and oriented to person, place, and time. Skin: Skin is warm and dry. No rash noted. Psychiatric: Her behavior is normal. Her mood appears depressed.    Intermittently tearful on exam. • buPROPion HCl ER, XL, 150 MG Oral Tablet 24 Hr 30 tablet 1     Sig: Take 1 tablet (150 mg total) by mouth daily. • Diclofenac Sodium 75 MG Oral Tab EC 30 tablet 0     Sig: Take 1 tablet (75 mg total) by mouth 3 (three) times daily.        Imaging & Refe

## 2020-03-05 RX ORDER — BUPROPION HYDROCHLORIDE 150 MG/1
150 TABLET ORAL DAILY
Qty: 30 TABLET | Refills: 2 | Status: SHIPPED | OUTPATIENT
Start: 2020-03-05 | End: 2020-04-01

## 2020-03-19 ENCOUNTER — ORDER TRANSCRIPTION (OUTPATIENT)
Dept: ADMINISTRATIVE | Facility: HOSPITAL | Age: 55
End: 2020-03-19

## 2020-03-19 DIAGNOSIS — Z13.9 ENCOUNTER FOR SCREENING: Primary | ICD-10-CM

## 2020-03-20 RX ORDER — DICLOFENAC SODIUM 75 MG/1
75 TABLET, DELAYED RELEASE ORAL 3 TIMES DAILY
Qty: 30 TABLET | Refills: 0 | Status: SHIPPED | OUTPATIENT
Start: 2020-03-20 | End: 2020-10-05

## 2020-03-20 RX ORDER — BUTALBITAL, ACETAMINOPHEN AND CAFFEINE 50; 325; 40 MG/1; MG/1; MG/1
TABLET ORAL
Qty: 30 TABLET | Refills: 0 | Status: SHIPPED | OUTPATIENT
Start: 2020-03-20 | End: 2021-02-15

## 2020-04-01 ENCOUNTER — PATIENT MESSAGE (OUTPATIENT)
Dept: FAMILY MEDICINE CLINIC | Facility: CLINIC | Age: 55
End: 2020-04-01

## 2020-04-01 RX ORDER — BUPROPION HYDROCHLORIDE 150 MG/1
150 TABLET ORAL DAILY
Qty: 30 TABLET | Refills: 5 | Status: SHIPPED | OUTPATIENT
Start: 2020-04-01 | End: 2020-10-03

## 2020-04-01 RX ORDER — METOCLOPRAMIDE 10 MG/1
10 TABLET ORAL
Qty: 20 TABLET | Refills: 0 | Status: SHIPPED | OUTPATIENT
Start: 2020-04-01 | End: 2020-08-28

## 2020-04-01 NOTE — TELEPHONE ENCOUNTER
From: Mikki Kayser  To: Cristi Miller PA-C  Sent: 4/1/2020 2:04 PM CDT  Subject: Other    Diallo Gross,  I hope you are taking care of yourself and your family during these unprecedented times we are all experiencing.      I really do not want to come in an

## 2020-04-01 NOTE — TELEPHONE ENCOUNTER
Spoke to patient. She has history of migraines and this headache is similar however longer in duration and more nausea than usual.  Pain is located in the frontal region. Also has pain in the occipital region primarily by the mastoid bone.   This pain rad

## 2020-08-28 RX ORDER — METOCLOPRAMIDE 10 MG/1
TABLET ORAL
Qty: 20 TABLET | Refills: 0 | Status: SHIPPED | OUTPATIENT
Start: 2020-08-28 | End: 2020-12-22 | Stop reason: ALTCHOICE

## 2020-08-28 NOTE — TELEPHONE ENCOUNTER
Rx Request  Metoclopramide HCl 10 MG Oral Tab    Disp:    20                R: 0    Last Visit: 12/30/2019    Last Refilled: 04/01/2020

## 2020-10-03 ENCOUNTER — PATIENT MESSAGE (OUTPATIENT)
Dept: FAMILY MEDICINE CLINIC | Facility: CLINIC | Age: 55
End: 2020-10-03

## 2020-10-03 RX ORDER — BUPROPION HYDROCHLORIDE 150 MG/1
150 TABLET ORAL DAILY
Qty: 30 TABLET | Refills: 0 | Status: SHIPPED | OUTPATIENT
Start: 2020-10-03 | End: 2020-10-05

## 2020-10-03 RX ORDER — BUPROPION HYDROCHLORIDE 150 MG/1
150 TABLET ORAL DAILY
Qty: 30 TABLET | Refills: 5 | Status: CANCELLED | OUTPATIENT
Start: 2020-10-03

## 2020-10-03 NOTE — TELEPHONE ENCOUNTER
Please approve/deny? Last filled 4/1/20 x 6 months, last ov 12/30/19, pt has appt Monday but is out of meds.

## 2020-10-03 NOTE — TELEPHONE ENCOUNTER
From: Bonifacio Bello  To: Oliva Franklin PA-C  Sent: 10/3/2020 9:02 AM CDT  Subject: Prescription Question    Good morning Emily,  Poor planning on my part, and thought I had a enough of my bupropion to get me until next week.  I have an appt set with

## 2020-10-05 ENCOUNTER — OFFICE VISIT (OUTPATIENT)
Dept: FAMILY MEDICINE CLINIC | Facility: CLINIC | Age: 55
End: 2020-10-05
Payer: COMMERCIAL

## 2020-10-05 VITALS
WEIGHT: 166 LBS | HEIGHT: 67 IN | RESPIRATION RATE: 16 BRPM | HEART RATE: 73 BPM | TEMPERATURE: 97 F | DIASTOLIC BLOOD PRESSURE: 82 MMHG | SYSTOLIC BLOOD PRESSURE: 116 MMHG | BODY MASS INDEX: 26.06 KG/M2 | OXYGEN SATURATION: 99 %

## 2020-10-05 DIAGNOSIS — Z12.31 VISIT FOR SCREENING MAMMOGRAM: ICD-10-CM

## 2020-10-05 DIAGNOSIS — Z12.4 CERVICAL CANCER SCREENING: ICD-10-CM

## 2020-10-05 DIAGNOSIS — N89.8 VAGINAL DISCHARGE: ICD-10-CM

## 2020-10-05 DIAGNOSIS — Z00.00 ROUTINE GENERAL MEDICAL EXAMINATION AT A HEALTH CARE FACILITY: Primary | ICD-10-CM

## 2020-10-05 DIAGNOSIS — F41.9 ANXIETY AND DEPRESSION: ICD-10-CM

## 2020-10-05 DIAGNOSIS — F32.A ANXIETY AND DEPRESSION: ICD-10-CM

## 2020-10-05 DIAGNOSIS — E78.2 MIXED HYPERLIPIDEMIA: ICD-10-CM

## 2020-10-05 DIAGNOSIS — IMO0002 CHRONIC MIGRAINE: ICD-10-CM

## 2020-10-05 DIAGNOSIS — Z12.11 COLON CANCER SCREENING: ICD-10-CM

## 2020-10-05 DIAGNOSIS — R92.2 DENSE BREAST TISSUE: ICD-10-CM

## 2020-10-05 PROBLEM — R92.30 DENSE BREAST TISSUE: Status: ACTIVE | Noted: 2020-10-05

## 2020-10-05 PROCEDURE — 3008F BODY MASS INDEX DOCD: CPT | Performed by: PHYSICIAN ASSISTANT

## 2020-10-05 PROCEDURE — 3074F SYST BP LT 130 MM HG: CPT | Performed by: PHYSICIAN ASSISTANT

## 2020-10-05 PROCEDURE — 3079F DIAST BP 80-89 MM HG: CPT | Performed by: PHYSICIAN ASSISTANT

## 2020-10-05 PROCEDURE — 88175 CYTOPATH C/V AUTO FLUID REDO: CPT | Performed by: PHYSICIAN ASSISTANT

## 2020-10-05 PROCEDURE — 99396 PREV VISIT EST AGE 40-64: CPT | Performed by: PHYSICIAN ASSISTANT

## 2020-10-05 PROCEDURE — 87624 HPV HI-RISK TYP POOLED RSLT: CPT | Performed by: PHYSICIAN ASSISTANT

## 2020-10-05 RX ORDER — BUPROPION HYDROCHLORIDE 150 MG/1
150 TABLET ORAL DAILY
Qty: 30 TABLET | Refills: 0 | Status: SHIPPED | OUTPATIENT
Start: 2020-10-05 | End: 2020-10-05

## 2020-10-05 RX ORDER — FLUCONAZOLE 150 MG/1
TABLET ORAL
Qty: 2 TABLET | Refills: 0 | Status: SHIPPED | OUTPATIENT
Start: 2020-10-05 | End: 2020-12-22 | Stop reason: ALTCHOICE

## 2020-10-05 RX ORDER — BUPROPION HYDROCHLORIDE 300 MG/1
300 TABLET ORAL DAILY
Qty: 90 TABLET | Refills: 1 | Status: SHIPPED | OUTPATIENT
Start: 2020-10-05 | End: 2021-04-18

## 2020-10-05 NOTE — PROGRESS NOTES
HPI:    Patient ID: Ritu Funes is a 50year old female. HPI   Patient presents today requesting physical exam. Overall feeling okay.       Diet: well balanced, sometimes skips lunch, decreased appetite recently which may be 2/2 stress  Exercise: Medications   Medication Sig Dispense Refill   • Sertraline HCl 50 MG Oral Tab Take 1 tablet (50 mg total) by mouth daily. 90 tablet 1   • fluconazole 150 MG Oral Tab Take 1 tablet by mouth once.  Repeat in 72 hours if symptoms persist. 2 tablet 0   • buPRO normal.   Cervix exhibits no motion tenderness. Right adnexum displays no mass, no tenderness and no fullness. Left adnexum displays no mass, no tenderness and no fullness. Vaginal discharge (thick cottage cheese-like discharge) present.    Musculoskelet tablet (50 mg total) by mouth daily. Dispense: 90 tablet; Refill: 1  - buPROPion HCl ER, XL, 300 MG Oral Tablet 24 Hr; Take 1 tablet (300 mg total) by mouth daily. Dispense: 90 tablet; Refill: 1    8.  Vaginal discharge  Thick yellow-white cottage cheese

## 2020-10-27 ENCOUNTER — LAB ENCOUNTER (OUTPATIENT)
Dept: LAB | Age: 55
End: 2020-10-27
Attending: PHYSICIAN ASSISTANT
Payer: COMMERCIAL

## 2020-10-27 DIAGNOSIS — Z00.00 ROUTINE GENERAL MEDICAL EXAMINATION AT A HEALTH CARE FACILITY: ICD-10-CM

## 2020-10-27 PROCEDURE — 84439 ASSAY OF FREE THYROXINE: CPT

## 2020-10-27 PROCEDURE — 84443 ASSAY THYROID STIM HORMONE: CPT

## 2020-10-27 PROCEDURE — 36415 COLL VENOUS BLD VENIPUNCTURE: CPT

## 2020-10-27 PROCEDURE — 82607 VITAMIN B-12: CPT

## 2020-10-27 PROCEDURE — 82306 VITAMIN D 25 HYDROXY: CPT

## 2020-10-27 PROCEDURE — 80061 LIPID PANEL: CPT

## 2020-10-27 PROCEDURE — 80053 COMPREHEN METABOLIC PANEL: CPT

## 2020-10-27 PROCEDURE — 85025 COMPLETE CBC W/AUTO DIFF WBC: CPT

## 2020-10-28 ENCOUNTER — TELEPHONE (OUTPATIENT)
Dept: FAMILY MEDICINE CLINIC | Facility: CLINIC | Age: 55
End: 2020-10-28

## 2020-10-29 DIAGNOSIS — E78.00 ELEVATED CHOLESTEROL: Primary | ICD-10-CM

## 2020-11-04 DIAGNOSIS — Z12.11 COLON CANCER SCREENING: Primary | ICD-10-CM

## 2020-11-10 ENCOUNTER — HOSPITAL ENCOUNTER (OUTPATIENT)
Dept: MAMMOGRAPHY | Age: 55
Discharge: HOME | End: 2020-11-10
Attending: PHYSICIAN ASSISTANT
Payer: COMMERCIAL

## 2020-11-10 DIAGNOSIS — Z12.31 VISIT FOR SCREENING MAMMOGRAM: ICD-10-CM

## 2020-11-10 PROCEDURE — 77063 BREAST TOMOSYNTHESIS BI: CPT | Performed by: PHYSICIAN ASSISTANT

## 2020-11-10 PROCEDURE — 77067 SCR MAMMO BI INCL CAD: CPT | Performed by: PHYSICIAN ASSISTANT

## 2020-11-11 ENCOUNTER — PATIENT MESSAGE (OUTPATIENT)
Dept: FAMILY MEDICINE CLINIC | Facility: CLINIC | Age: 55
End: 2020-11-11

## 2020-11-11 NOTE — TELEPHONE ENCOUNTER
From: Irvin Bello  To: Immanuel Emerson PA-C  Sent: 11/11/2020 10:05 AM CST  Subject: Test Results Question    I have a question about [facility] LILIA 2D+3D SCREENING BILAT (CPT=77067/17304) resulted on 11/10/20, 3:10 PM.    Yes I figured and honestly belie

## 2020-11-12 ENCOUNTER — HOSPITAL ENCOUNTER (OUTPATIENT)
Dept: CT IMAGING | Age: 55
Discharge: HOME | End: 2020-11-12
Attending: PHYSICIAN ASSISTANT

## 2020-11-12 DIAGNOSIS — Z13.6 SCREENING FOR CARDIOVASCULAR CONDITION: ICD-10-CM

## 2020-11-12 DIAGNOSIS — Z13.9 ENCOUNTER FOR SCREENING: ICD-10-CM

## 2020-11-12 NOTE — PROGRESS NOTES
Pt seen at [facility] for [facility]:  PRELIMINARY NYBXL=392.12    *patient denies any chest pain/pressure or shortness of breath at this time.     EN=133/84    Cholestech fingerstick for bloodwork as follows:  UF=553  HDL=59  LDL= n/a  TG=<45  GLUCOSE=

## 2020-11-16 ENCOUNTER — TELEPHONE (OUTPATIENT)
Dept: FAMILY MEDICINE CLINIC | Facility: CLINIC | Age: 55
End: 2020-11-16

## 2020-11-16 DIAGNOSIS — E78.5 HYPERLIPIDEMIA, UNSPECIFIED HYPERLIPIDEMIA TYPE: ICD-10-CM

## 2020-11-16 DIAGNOSIS — I25.10 CORONARY ARTERY DISEASE INVOLVING NATIVE CORONARY ARTERY OF NATIVE HEART WITHOUT ANGINA PECTORIS: Primary | ICD-10-CM

## 2020-11-16 DIAGNOSIS — E03.9 HYPOTHYROIDISM, UNSPECIFIED TYPE: ICD-10-CM

## 2020-11-16 PROCEDURE — 99213 OFFICE O/P EST LOW 20 MIN: CPT | Performed by: FAMILY MEDICINE

## 2020-11-16 NOTE — TELEPHONE ENCOUNTER
Virtual Telephone Check-In    Koby Bello verbally consents to a Virtual/Telephone Check-In visit on 11/16/20. Patient has been referred to the [facility] website at [email or web address] to review the yearly Consent to Treat document.     Patient und

## 2020-11-17 ENCOUNTER — ORDER TRANSCRIPTION (OUTPATIENT)
Dept: ADMINISTRATIVE | Facility: HOSPITAL | Age: 55
End: 2020-11-17

## 2020-11-17 DIAGNOSIS — Z13.9 ENCOUNTER FOR SCREENING: Primary | ICD-10-CM

## 2020-11-19 ENCOUNTER — HOSPITAL ENCOUNTER (OUTPATIENT)
Dept: CARDIOLOGY CLINIC | Facility: HOSPITAL | Age: 55
Discharge: HOME | End: 2020-11-19
Attending: PHYSICIAN ASSISTANT

## 2020-11-19 DIAGNOSIS — Z13.9 ENCOUNTER FOR SCREENING: ICD-10-CM

## 2020-11-27 ENCOUNTER — HOSPITAL ENCOUNTER (OUTPATIENT)
Dept: MAMMOGRAPHY | Age: 55
Discharge: HOME | End: 2020-11-27
Attending: PHYSICIAN ASSISTANT
Payer: COMMERCIAL

## 2020-11-27 ENCOUNTER — HOSPITAL ENCOUNTER (OUTPATIENT)
Dept: ULTRASOUND IMAGING | Age: 55
Discharge: HOME | End: 2020-11-27
Attending: PHYSICIAN ASSISTANT
Payer: COMMERCIAL

## 2020-11-27 DIAGNOSIS — R92.2 INCONCLUSIVE MAMMOGRAM: ICD-10-CM

## 2020-11-27 PROCEDURE — 77065 DX MAMMO INCL CAD UNI: CPT | Performed by: PHYSICIAN ASSISTANT

## 2020-11-27 PROCEDURE — 77061 BREAST TOMOSYNTHESIS UNI: CPT | Performed by: PHYSICIAN ASSISTANT

## 2020-11-27 PROCEDURE — 76642 ULTRASOUND BREAST LIMITED: CPT | Performed by: PHYSICIAN ASSISTANT

## 2020-12-07 DIAGNOSIS — R92.8 ABNORMAL MAMMOGRAM OF LEFT BREAST: Primary | ICD-10-CM

## 2020-12-07 DIAGNOSIS — R92.1 BREAST CALCIFICATION, LEFT: ICD-10-CM

## 2020-12-10 ENCOUNTER — TELEPHONE (OUTPATIENT)
Dept: FAMILY MEDICINE CLINIC | Facility: CLINIC | Age: 55
End: 2020-12-10

## 2020-12-10 ENCOUNTER — TELEPHONE (OUTPATIENT)
Dept: MAMMOGRAPHY | Facility: HOSPITAL | Age: 55
End: 2020-12-10

## 2020-12-10 NOTE — TELEPHONE ENCOUNTER
Attempted to call patient re: breast biopsy recommendation screening. Message left for patient to call back.

## 2020-12-11 ENCOUNTER — TELEPHONE (OUTPATIENT)
Dept: FAMILY MEDICINE CLINIC | Facility: CLINIC | Age: 55
End: 2020-12-11

## 2020-12-11 ENCOUNTER — TELEPHONE (OUTPATIENT)
Dept: MAMMOGRAPHY | Facility: HOSPITAL | Age: 55
End: 2020-12-11

## 2020-12-11 DIAGNOSIS — E03.9 HYPOTHYROIDISM, UNSPECIFIED TYPE: ICD-10-CM

## 2020-12-11 RX ORDER — LEVOTHYROXINE SODIUM 0.03 MG/1
25 TABLET ORAL
Qty: 30 TABLET | Refills: 0 | Status: SHIPPED | OUTPATIENT
Start: 2020-12-11 | End: 2021-01-11

## 2020-12-11 NOTE — TELEPHONE ENCOUNTER
Called patient re: breast biopsy recommendation. She is unable to talk at this time. She was given our contact number and hours and will call us back.

## 2020-12-11 NOTE — TELEPHONE ENCOUNTER
Patient due for repeat TSH the end of December. I refilled Levothyroxine for 1 month, reminded patient to get labs in 3 weeks.

## 2020-12-11 NOTE — TELEPHONE ENCOUNTER
PT NEEDS A REFILL ON HER LEVOTHYROXINE. PT NEEDS IT BECAUSE SHE THREW AWAY 15 PILLS ON ACCIDENT. NEEDS IT BY Sunday. ALSO SHOULD SHE INCREASE THE DOSE? [facility] ON FILE.

## 2020-12-17 ENCOUNTER — HOSPITAL ENCOUNTER (OUTPATIENT)
Dept: MAMMOGRAPHY | Facility: HOSPITAL | Age: 55
Discharge: HOME | End: 2020-12-17
Attending: PHYSICIAN ASSISTANT
Payer: COMMERCIAL

## 2020-12-17 DIAGNOSIS — R92.8 ABNORMAL MAMMOGRAM OF LEFT BREAST: ICD-10-CM

## 2020-12-17 DIAGNOSIS — R92.1 BREAST CALCIFICATION, LEFT: ICD-10-CM

## 2020-12-17 PROCEDURE — 19081 BX BREAST 1ST LESION STRTCTC: CPT | Performed by: PHYSICIAN ASSISTANT

## 2020-12-17 PROCEDURE — 88305 TISSUE EXAM BY PATHOLOGIST: CPT | Performed by: PHYSICIAN ASSISTANT

## 2020-12-21 ENCOUNTER — TELEPHONE (OUTPATIENT)
Dept: FAMILY MEDICINE CLINIC | Facility: CLINIC | Age: 55
End: 2020-12-21

## 2020-12-21 NOTE — TELEPHONE ENCOUNTER
Radiology calling. Left breast biopsy performed  pt has ADH and needs surgical consult. Pathology result in chart. Radiology giving pt Dr Austyn Watts contact info.

## 2020-12-21 NOTE — TELEPHONE ENCOUNTER
Just spoke to patient in detail regarding her results. Explained what ADH is and the increased risk of breast cancer. Referred to general surgery for further evaluation and management with excision.

## 2020-12-21 NOTE — IMAGING NOTE
1600: Contacted Jim Gonazlez post Left Breast stereotactic biopsy. Introduced myself and role as breast care coordinator. Reinforced post biopsy care and instruction.   MsChristopher Dino Romero denies any issues with biopsy site- bleeding, drainage, redness, tendern

## 2020-12-22 ENCOUNTER — OFFICE VISIT (OUTPATIENT)
Dept: SURGERY | Facility: CLINIC | Age: 55
End: 2020-12-22

## 2020-12-22 ENCOUNTER — TELEPHONE (OUTPATIENT)
Dept: SURGERY | Facility: CLINIC | Age: 55
End: 2020-12-22

## 2020-12-22 ENCOUNTER — OFFICE VISIT (OUTPATIENT)
Dept: HEMATOLOGY/ONCOLOGY | Facility: HOSPITAL | Age: 55
End: 2020-12-22
Attending: SURGERY
Payer: COMMERCIAL

## 2020-12-22 VITALS
RESPIRATION RATE: 18 BRPM | DIASTOLIC BLOOD PRESSURE: 73 MMHG | WEIGHT: 169 LBS | HEIGHT: 66.69 IN | SYSTOLIC BLOOD PRESSURE: 110 MMHG | BODY MASS INDEX: 26.84 KG/M2 | TEMPERATURE: 98 F | HEART RATE: 67 BPM | OXYGEN SATURATION: 98 %

## 2020-12-22 DIAGNOSIS — N60.92 ATYPICAL DUCTAL HYPERPLASIA OF LEFT BREAST: Primary | ICD-10-CM

## 2020-12-22 DIAGNOSIS — N60.92 ATYPICAL DUCTAL HYPERPLASIA OF LEFT BREAST: ICD-10-CM

## 2020-12-22 DIAGNOSIS — Z80.41 FAMILY HISTORY OF OVARIAN CANCER: ICD-10-CM

## 2020-12-22 PROCEDURE — 99204 OFFICE O/P NEW MOD 45 MIN: CPT | Performed by: SURGERY

## 2020-12-22 PROCEDURE — 99211 OFF/OP EST MAY X REQ PHY/QHP: CPT

## 2020-12-22 NOTE — PROGRESS NOTES
Patient is here today for Consult  with Dr. Demetrius Villegas / Belkis Chavez. Patient Denies pain. Medication list and medical history were reviewed and updated.     Education Record    Learner:  Patient    Disease / Diagnosis: Breast Clinic / Dr. Demetrius Villegas    Barriers

## 2020-12-22 NOTE — H&P
New Patient Visit Note       Active Problems      1. Atypical ductal hyperplasia of left breast    2.  Family history of ovarian cancer        Chief Complaint   Left breast atypical ductal hyperplasia      History of Present Illness   The patient is a 55-ye Not on file      Highest education level: Not on file    Tobacco Use      Smoking status: Former Smoker        Packs/day: 0.50        Years: 3.00        Pack years: 1.5        Quit date: 1988        Years since quittin.5      Smokeless tobacco: N bruising  Integumentary:  Negative for pruritus and rash  Musculoskeletal:  Negative for bone/joint symptoms  Neurological:  Negative for gait disturbance  Psychiatric:  Negative for inappropriate interaction and psychiatric symptoms  Respiratory:  Negativ adenopathy present. Right: No supraclavicular adenopathy present. Left: No supraclavicular adenopathy present. Neurological: She is alert and oriented to person, place, and time. Skin: Skin is intact. She is not diaphoretic.    Psychiatric: microcalcification.      Electronically signed by Johnie Dinero MD on 12/21/2020        Assessment   Atypical ductal hyperplasia of left breast  (primary encounter diagnosis)  Family history of ovarian cancer      Plan   · I discussed the nature of atypica

## 2020-12-22 NOTE — H&P (VIEW-ONLY)
New Patient Visit Note       Active Problems      1. Atypical ductal hyperplasia of left breast    2.  Family history of ovarian cancer        Chief Complaint   Left breast atypical ductal hyperplasia      History of Present Illness   The patient is a 55-ye Not on file      Highest education level: Not on file    Tobacco Use      Smoking status: Former Smoker        Packs/day: 0.50        Years: 3.00        Pack years: 1.5        Quit date: 1988        Years since quittin.5      Smokeless tobacco: N bruising  Integumentary:  Negative for pruritus and rash  Musculoskeletal:  Negative for bone/joint symptoms  Neurological:  Negative for gait disturbance  Psychiatric:  Negative for inappropriate interaction and psychiatric symptoms  Respiratory:  Negativ adenopathy present. Right: No supraclavicular adenopathy present. Left: No supraclavicular adenopathy present. Neurological: She is alert and oriented to person, place, and time. Skin: Skin is intact. She is not diaphoretic.    Psychiatric: microcalcification.      Electronically signed by Mynor Castaneda MD on 12/21/2020        Assessment   Atypical ductal hyperplasia of left breast  (primary encounter diagnosis)  Family history of ovarian cancer      Plan   · I discussed the nature of atypica

## 2020-12-24 ENCOUNTER — TELEPHONE (OUTPATIENT)
Dept: SURGERY | Facility: CLINIC | Age: 55
End: 2020-12-24

## 2020-12-24 NOTE — TELEPHONE ENCOUNTER
----- Message from Farhad Fitch sent at 12/22/2020 11:48 AM CST -----  Hello,    Please call the patient and go over surgery checklist. Surgery date is 1-14 no 1-4.       Thanks  Jaret Mcneil  ----- Message -----  From: Thelma Black MD  Sent: 12/22/202

## 2020-12-28 RX ORDER — ASPIRIN 81 MG/1
81 TABLET ORAL DAILY
COMMUNITY

## 2021-01-11 ENCOUNTER — LAB ENCOUNTER (OUTPATIENT)
Dept: LAB | Age: 56
End: 2021-01-11
Attending: SURGERY
Payer: COMMERCIAL

## 2021-01-11 ENCOUNTER — TELEPHONE (OUTPATIENT)
Dept: MAMMOGRAPHY | Facility: HOSPITAL | Age: 56
End: 2021-01-11

## 2021-01-11 ENCOUNTER — TELEPHONE (OUTPATIENT)
Dept: GENERAL RADIOLOGY | Facility: HOSPITAL | Age: 56
End: 2021-01-11

## 2021-01-11 ENCOUNTER — LAB ENCOUNTER (OUTPATIENT)
Dept: LAB | Age: 56
End: 2021-01-11
Attending: PHYSICIAN ASSISTANT
Payer: COMMERCIAL

## 2021-01-11 DIAGNOSIS — E03.9 HYPOTHYROIDISM, UNSPECIFIED TYPE: ICD-10-CM

## 2021-01-11 DIAGNOSIS — N60.92 ATYPICAL DUCTAL HYPERPLASIA OF LEFT BREAST: ICD-10-CM

## 2021-01-11 LAB
T4 FREE SERPL-MCNC: 0.8 NG/DL (ref 0.8–1.7)
TSI SER-ACNC: 1.95 MIU/ML (ref 0.36–3.74)

## 2021-01-11 PROCEDURE — 84443 ASSAY THYROID STIM HORMONE: CPT

## 2021-01-11 PROCEDURE — 36415 COLL VENOUS BLD VENIPUNCTURE: CPT

## 2021-01-11 PROCEDURE — 84439 ASSAY OF FREE THYROXINE: CPT

## 2021-01-11 RX ORDER — LEVOTHYROXINE SODIUM 0.03 MG/1
25 TABLET ORAL
Qty: 90 TABLET | Refills: 0 | Status: SHIPPED | OUTPATIENT
Start: 2021-01-11 | End: 2021-04-12

## 2021-01-11 NOTE — TELEPHONE ENCOUNTER
Patient is requesting refill of Levothyroxine Sodium 25 MCG Oral Tab.     [address], [place] - [facility] [place] [address], [phone]

## 2021-01-11 NOTE — TELEPHONE ENCOUNTER
Last refill of LEVOTHYROXINE 25mcg 12/11/2020  Last TSH 1/11/2021: 1.950.  10/27/2020 TSH 3.820.  10/5/2020 last OV  Approve/deny:

## 2021-01-11 NOTE — TELEPHONE ENCOUNTER
Phoned Jacque Bello regarding needle localization process of breast for lumpectomy scheduled for 1-14-21 with Dr. Shelagh Bernheim. Procedure explained and all questions answered. Pt to be transported via W/C through [facility] to [facility] in [facility].  Pt verbalize

## 2021-01-12 LAB — SARS-COV-2 RNA RESP QL NAA+PROBE: NOT DETECTED

## 2021-01-14 ENCOUNTER — ANESTHESIA EVENT (OUTPATIENT)
Dept: SURGERY | Facility: HOSPITAL | Age: 56
End: 2021-01-14
Payer: COMMERCIAL

## 2021-01-14 ENCOUNTER — HOSPITAL ENCOUNTER (OUTPATIENT)
Facility: HOSPITAL | Age: 56
Setting detail: HOSPITAL OUTPATIENT SURGERY
Discharge: HOME | End: 2021-01-14
Attending: SURGERY | Admitting: SURGERY
Payer: COMMERCIAL

## 2021-01-14 ENCOUNTER — HOSPITAL ENCOUNTER (OUTPATIENT)
Dept: MAMMOGRAPHY | Facility: HOSPITAL | Age: 56
Discharge: HOME | End: 2021-01-14
Attending: SURGERY
Payer: COMMERCIAL

## 2021-01-14 ENCOUNTER — ANESTHESIA (OUTPATIENT)
Dept: SURGERY | Facility: HOSPITAL | Age: 56
End: 2021-01-14
Payer: COMMERCIAL

## 2021-01-14 VITALS
WEIGHT: 167.88 LBS | OXYGEN SATURATION: 98 % | HEART RATE: 78 BPM | BODY MASS INDEX: 26.35 KG/M2 | RESPIRATION RATE: 16 BRPM | HEIGHT: 67 IN | SYSTOLIC BLOOD PRESSURE: 117 MMHG | DIASTOLIC BLOOD PRESSURE: 93 MMHG | TEMPERATURE: 99 F

## 2021-01-14 DIAGNOSIS — N60.92 ATYPICAL DUCTAL HYPERPLASIA OF LEFT BREAST: Primary | ICD-10-CM

## 2021-01-14 DIAGNOSIS — N60.92 ATYPICAL DUCTAL HYPERPLASIA OF LEFT BREAST: ICD-10-CM

## 2021-01-14 DIAGNOSIS — G89.18 POST-OPERATIVE PAIN: ICD-10-CM

## 2021-01-14 LAB
POCT LOT NUMBER: NORMAL
POCT URINE PREGNANCY: NEGATIVE

## 2021-01-14 PROCEDURE — 0HBU0ZX EXCISION OF LEFT BREAST, OPEN APPROACH, DIAGNOSTIC: ICD-10-PCS | Performed by: SURGERY

## 2021-01-14 PROCEDURE — 81025 URINE PREGNANCY TEST: CPT | Performed by: SURGERY

## 2021-01-14 PROCEDURE — 76098 X-RAY EXAM SURGICAL SPECIMEN: CPT | Performed by: SURGERY

## 2021-01-14 PROCEDURE — 88305 TISSUE EXAM BY PATHOLOGIST: CPT | Performed by: SURGERY

## 2021-01-14 PROCEDURE — 88342 IMHCHEM/IMCYTCHM 1ST ANTB: CPT | Performed by: SURGERY

## 2021-01-14 PROCEDURE — 19281 PERQ DEVICE BREAST 1ST IMAG: CPT | Performed by: SURGERY

## 2021-01-14 PROCEDURE — 88307 TISSUE EXAM BY PATHOLOGIST: CPT | Performed by: SURGERY

## 2021-01-14 PROCEDURE — 88341 IMHCHEM/IMCYTCHM EA ADD ANTB: CPT | Performed by: SURGERY

## 2021-01-14 RX ORDER — NALOXONE HYDROCHLORIDE 0.4 MG/ML
80 INJECTION, SOLUTION INTRAMUSCULAR; INTRAVENOUS; SUBCUTANEOUS AS NEEDED
Status: CANCELLED | OUTPATIENT
Start: 2021-01-14 | End: 2021-01-14

## 2021-01-14 RX ORDER — LIDOCAINE HYDROCHLORIDE 10 MG/ML
INJECTION, SOLUTION EPIDURAL; INFILTRATION; INTRACAUDAL; PERINEURAL AS NEEDED
Status: DISCONTINUED | OUTPATIENT
Start: 2021-01-14 | End: 2021-01-14 | Stop reason: SURG

## 2021-01-14 RX ORDER — SODIUM CHLORIDE, SODIUM LACTATE, POTASSIUM CHLORIDE, CALCIUM CHLORIDE 600; 310; 30; 20 MG/100ML; MG/100ML; MG/100ML; MG/100ML
INJECTION, SOLUTION INTRAVENOUS CONTINUOUS
Status: CANCELLED | OUTPATIENT
Start: 2021-01-14

## 2021-01-14 RX ORDER — LIDOCAINE HYDROCHLORIDE 10 MG/ML
INJECTION, SOLUTION INFILTRATION; PERINEURAL AS NEEDED
Status: DISCONTINUED | OUTPATIENT
Start: 2021-01-14 | End: 2021-01-14 | Stop reason: HOSPADM

## 2021-01-14 RX ORDER — ACETAMINOPHEN 500 MG
1000 TABLET ORAL ONCE
Status: ON HOLD | COMMUNITY
End: 2021-01-14

## 2021-01-14 RX ORDER — ACETAMINOPHEN 500 MG
1000 TABLET ORAL ONCE
Status: DISCONTINUED | OUTPATIENT
Start: 2021-01-14 | End: 2021-01-14 | Stop reason: HOSPADM

## 2021-01-14 RX ORDER — PHENYLEPHRINE HCL 10 MG/ML
VIAL (ML) INJECTION AS NEEDED
Status: DISCONTINUED | OUTPATIENT
Start: 2021-01-14 | End: 2021-01-14 | Stop reason: SURG

## 2021-01-14 RX ORDER — ACETAMINOPHEN 500 MG
1000 TABLET ORAL ONCE AS NEEDED
Status: CANCELLED | OUTPATIENT
Start: 2021-01-14 | End: 2021-01-14

## 2021-01-14 RX ORDER — DIAZEPAM 5 MG/1
5 TABLET ORAL AS NEEDED
Status: DISCONTINUED | OUTPATIENT
Start: 2021-01-14 | End: 2021-01-14 | Stop reason: HOSPADM

## 2021-01-14 RX ORDER — CEFAZOLIN SODIUM/WATER 2 G/20 ML
SYRINGE (ML) INTRAVENOUS
Status: DISCONTINUED
Start: 2021-01-14 | End: 2021-01-14

## 2021-01-14 RX ORDER — ACETAMINOPHEN AND CODEINE PHOSPHATE 300; 30 MG/1; MG/1
1 TABLET ORAL AS NEEDED
Status: CANCELLED | OUTPATIENT
Start: 2021-01-14 | End: 2021-01-14

## 2021-01-14 RX ORDER — MIDAZOLAM HYDROCHLORIDE 1 MG/ML
INJECTION INTRAMUSCULAR; INTRAVENOUS AS NEEDED
Status: DISCONTINUED | OUTPATIENT
Start: 2021-01-14 | End: 2021-01-14 | Stop reason: SURG

## 2021-01-14 RX ORDER — ONDANSETRON 2 MG/ML
INJECTION INTRAMUSCULAR; INTRAVENOUS AS NEEDED
Status: DISCONTINUED | OUTPATIENT
Start: 2021-01-14 | End: 2021-01-14 | Stop reason: SURG

## 2021-01-14 RX ORDER — SODIUM CHLORIDE, SODIUM LACTATE, POTASSIUM CHLORIDE, CALCIUM CHLORIDE 600; 310; 30; 20 MG/100ML; MG/100ML; MG/100ML; MG/100ML
INJECTION, SOLUTION INTRAVENOUS CONTINUOUS
Status: DISCONTINUED | OUTPATIENT
Start: 2021-01-14 | End: 2021-01-14

## 2021-01-14 RX ORDER — ACETAMINOPHEN AND CODEINE PHOSPHATE 300; 30 MG/1; MG/1
2 TABLET ORAL AS NEEDED
Status: CANCELLED | OUTPATIENT
Start: 2021-01-14 | End: 2021-01-14

## 2021-01-14 RX ORDER — HYDROMORPHONE HYDROCHLORIDE 1 MG/ML
0.4 INJECTION, SOLUTION INTRAMUSCULAR; INTRAVENOUS; SUBCUTANEOUS EVERY 5 MIN PRN
Status: CANCELLED | OUTPATIENT
Start: 2021-01-14 | End: 2021-01-14

## 2021-01-14 RX ORDER — DEXAMETHASONE SODIUM PHOSPHATE 4 MG/ML
VIAL (ML) INJECTION AS NEEDED
Status: DISCONTINUED | OUTPATIENT
Start: 2021-01-14 | End: 2021-01-14 | Stop reason: SURG

## 2021-01-14 RX ORDER — ACETAMINOPHEN AND CODEINE PHOSPHATE 300; 30 MG/1; MG/1
1-2 TABLET ORAL EVERY 4 HOURS PRN
Qty: 15 TABLET | Refills: 0 | Status: SHIPPED | OUTPATIENT
Start: 2021-01-14 | End: 2021-01-22

## 2021-01-14 RX ORDER — CEFAZOLIN SODIUM/WATER 2 G/20 ML
2 SYRINGE (ML) INTRAVENOUS ONCE
Status: COMPLETED | OUTPATIENT
Start: 2021-01-14 | End: 2021-01-14

## 2021-01-14 RX ORDER — ONDANSETRON 2 MG/ML
4 INJECTION INTRAMUSCULAR; INTRAVENOUS AS NEEDED
Status: CANCELLED | OUTPATIENT
Start: 2021-01-14 | End: 2021-01-14

## 2021-01-14 RX ORDER — BUPIVACAINE HYDROCHLORIDE AND EPINEPHRINE 5; 5 MG/ML; UG/ML
INJECTION, SOLUTION EPIDURAL; INTRACAUDAL; PERINEURAL AS NEEDED
Status: DISCONTINUED | OUTPATIENT
Start: 2021-01-14 | End: 2021-01-14 | Stop reason: HOSPADM

## 2021-01-14 RX ADMIN — PHENYLEPHRINE HCL 100 MCG: 10 MG/ML VIAL (ML) INJECTION at 10:06:00

## 2021-01-14 RX ADMIN — LIDOCAINE HYDROCHLORIDE 25 MG: 10 INJECTION, SOLUTION EPIDURAL; INFILTRATION; INTRACAUDAL; PERINEURAL at 09:56:00

## 2021-01-14 RX ADMIN — PHENYLEPHRINE HCL 50 MCG: 10 MG/ML VIAL (ML) INJECTION at 10:21:00

## 2021-01-14 RX ADMIN — MIDAZOLAM HYDROCHLORIDE 2 MG: 1 INJECTION INTRAMUSCULAR; INTRAVENOUS at 09:52:00

## 2021-01-14 RX ADMIN — CEFAZOLIN SODIUM/WATER 2 G: 2 G/20 ML SYRINGE (ML) INTRAVENOUS at 10:07:00

## 2021-01-14 RX ADMIN — ONDANSETRON 4 MG: 2 INJECTION INTRAMUSCULAR; INTRAVENOUS at 10:11:00

## 2021-01-14 RX ADMIN — SODIUM CHLORIDE, SODIUM LACTATE, POTASSIUM CHLORIDE, CALCIUM CHLORIDE: 600; 310; 30; 20 INJECTION, SOLUTION INTRAVENOUS at 10:47:00

## 2021-01-14 RX ADMIN — DEXAMETHASONE SODIUM PHOSPHATE 4 MG: 4 MG/ML VIAL (ML) INJECTION at 10:08:00

## 2021-01-14 NOTE — ANESTHESIA POSTPROCEDURE EVALUATION
Ewa Keith Patient Status:  Hospital Outpatient Surgery   Age/Gender 50year old female MRN [id number]   [facility] Attending Aide Zarate MD   [address] # 0 PCP Erica Quiroz PA-C       Anesthesia P

## 2021-01-14 NOTE — ANESTHESIA PREPROCEDURE EVALUATION
PRE-OP EVALUATION    Patient Name: [facility]    Pre-op Diagnosis: Atypical ductal hyperplasia of left breast [N60.92]    Procedure(s):  LEFT BREAST LUMPECTOMY WITH XRAY LOCALIZATION    Surgeon(s) and Role:     Tr Schmid MD - Primary at Unknown time        Allergies: Patient has no known allergies. Anesthesia Evaluation        Anesthetic Complications           GI/Hepatic/Renal    Negative GI/hepatic/renal ROS.                              Cardiovascular                     (+) hyp general  NPO status verified and patient meets guidelines. Plan/risks discussed with: patient (Risks discussed including nausea, vomiting, dental damage, stroke and heart attack.   Patient understands plan and wishes to proceed.)                Pre

## 2021-01-14 NOTE — INTERVAL H&P NOTE
Pre-op Diagnosis: Atypical ductal hyperplasia of left breast [N60.92]    The above referenced H&P was reviewed by Neeta Cisse MD on 1/14/2021, the patient was examined and no significant changes have occurred in the patient's condition since the H&

## 2021-01-14 NOTE — OPERATIVE REPORT
[facility] REPORT - [id number]                        -----------------------------------------------------------------------  [facility]: [id number] would  require surgery on another day. She was agreeable to proceed with the  operation.     TECHNIQUE:    After informed consent was obtained, the patient was taken to the  radiology suite where a wire was placed to localize her breast lesion as  well as t

## 2021-01-14 NOTE — IMAGING NOTE
Assisted  with mammography guided needle localization of the Left breast.   Order verified. Procedure explained and questions answered. Ritesh Bello verbalized understanding and agreement. Written consent obtained. Time out completed.

## 2021-01-21 ENCOUNTER — GENETICS ENCOUNTER (OUTPATIENT)
Dept: GENETICS | Facility: HOSPITAL | Age: 56
End: 2021-01-21
Attending: SURGERY
Payer: COMMERCIAL

## 2021-01-21 NOTE — PROGRESS NOTES
Referring Provider:  Anabell Oswald MD    Additional Provider(s):  Claudia Burns PA-C    Reason for Referral:  Alan Hackett was referred for genetic counseling because of a family history of ovarian cancer.   Ms. Ender Chowdhury is a 50year-old woman of [facility] ovarian or pancreatic cancer. Please see the pedigree for additional family history information. Counseling: The following information was discussed with Ms. Phong Red. Genetics of Breast Cancer:   In the [place], approximately 1 in 8 wo one of these genes that Ms. Bello did not inherit. In this scenario, options for cancer screening/management should be determined according to personal and family histories and should be discussed with a physician.       A variant of uncertain significanc additional family history information is needed to determine if Ms. Bello meets testing criteria. Ms. Aissatou Lee will talk with her sister and other relatives and call me when she has more information.       Approximately 40 minutes was spent in consultatio

## 2021-01-22 ENCOUNTER — OFFICE VISIT (OUTPATIENT)
Dept: SURGERY | Facility: CLINIC | Age: 56
End: 2021-01-22
Payer: COMMERCIAL

## 2021-01-22 VITALS — TEMPERATURE: 97 F

## 2021-01-22 DIAGNOSIS — N60.92 ATYPICAL DUCTAL HYPERPLASIA OF LEFT BREAST: Primary | ICD-10-CM

## 2021-01-22 DIAGNOSIS — Z12.11 ENCOUNTER FOR SCREENING COLONOSCOPY: ICD-10-CM

## 2021-01-22 PROCEDURE — S0285 CNSLT BEFORE SCREEN COLONOSC: HCPCS | Performed by: SURGERY

## 2021-01-22 RX ORDER — SODIUM, POTASSIUM,MAG SULFATES 17.5-3.13G
SOLUTION, RECONSTITUTED, ORAL ORAL
Qty: 1 KIT | Refills: 0 | Status: SHIPPED | OUTPATIENT
Start: 2021-01-22 | End: 2021-06-09

## 2021-01-22 NOTE — PROGRESS NOTES
Post Operative Visit Note       Active Problems  1. Atypical ductal hyperplasia of left breast    2.  Encounter for screening colonoscopy         Chief Complaint   Patient presents with:  Post-Op: p/0 1/14 left breast lumpectomy         History of Present I Marital status:       Spouse name: Not on file      Number of children: Not on file      Years of education: Not on file      Highest education level: Not on file    Tobacco Use      Smoking status: Former Smoker        Packs/day: 0.50        Years: fatigue, fever and unexpected weight change. HENT: Negative for hearing loss, nosebleeds, sore throat and trouble swallowing. Respiratory: Negative for apnea, cough, shortness of breath and wheezing.     Cardiovascular: Negative for chest pain, palpita hyperplasia, cystic apocrine metaplasia, columnar cell change, dilated ducts, microcysts, and few microcalcifications.  -Biopsy site changes are present.     B.   Left breast, lateral, lumpectomy:  -Breast tissue with fibroadenomatous change, apocrine metap

## 2021-02-11 ENCOUNTER — PATIENT MESSAGE (OUTPATIENT)
Dept: FAMILY MEDICINE CLINIC | Facility: CLINIC | Age: 56
End: 2021-02-11

## 2021-02-11 ENCOUNTER — LAB ENCOUNTER (OUTPATIENT)
Dept: LAB | Age: 56
End: 2021-02-11
Attending: PHYSICIAN ASSISTANT
Payer: COMMERCIAL

## 2021-02-11 DIAGNOSIS — E78.00 ELEVATED CHOLESTEROL: ICD-10-CM

## 2021-02-11 LAB
ALT SERPL-CCNC: 26 U/L
AST SERPL-CCNC: 17 U/L (ref 15–37)
CHOLEST SMN-MCNC: 178 MG/DL (ref ?–200)
HDLC SERPL-MCNC: 69 MG/DL (ref 40–59)
LDLC SERPL CALC-MCNC: 94 MG/DL (ref ?–100)
NONHDLC SERPL-MCNC: 109 MG/DL (ref ?–130)
PATIENT FASTING Y/N/NP: YES
TRIGL SERPL-MCNC: 77 MG/DL (ref 30–149)
VLDLC SERPL CALC-MCNC: 15 MG/DL (ref 0–30)

## 2021-02-11 PROCEDURE — 84450 TRANSFERASE (AST) (SGOT): CPT

## 2021-02-11 PROCEDURE — 84460 ALANINE AMINO (ALT) (SGPT): CPT

## 2021-02-11 PROCEDURE — 36415 COLL VENOUS BLD VENIPUNCTURE: CPT

## 2021-02-11 PROCEDURE — 80061 LIPID PANEL: CPT

## 2021-02-12 NOTE — TELEPHONE ENCOUNTER
From: Myrl Lesch Stankus  To: Erica Quiroz PA-C  Sent: 2/11/2021 4:45 PM CST  Subject: Test Results Question    I have a question about LIPID PANEL resulted on 2/11/21, 3:57 PM.    Wow. I'm shocked.  How do I know if it's all the work of the statin or ho

## 2021-02-15 RX ORDER — BUTALBITAL, ACETAMINOPHEN AND CAFFEINE 50; 325; 40 MG/1; MG/1; MG/1
TABLET ORAL
Qty: 30 TABLET | Refills: 0 | OUTPATIENT
Start: 2021-02-15

## 2021-02-15 RX ORDER — BUTALBITAL, ACETAMINOPHEN AND CAFFEINE 50; 325; 40 MG/1; MG/1; MG/1
TABLET ORAL
Qty: 30 TABLET | Refills: 0 | Status: SHIPPED | OUTPATIENT
Start: 2021-02-15 | End: 2021-06-09

## 2021-04-10 DIAGNOSIS — F41.9 ANXIETY AND DEPRESSION: ICD-10-CM

## 2021-04-10 DIAGNOSIS — E03.9 HYPOTHYROIDISM, UNSPECIFIED TYPE: ICD-10-CM

## 2021-04-10 DIAGNOSIS — F32.A ANXIETY AND DEPRESSION: ICD-10-CM

## 2021-04-12 RX ORDER — LEVOTHYROXINE SODIUM 0.03 MG/1
TABLET ORAL
Qty: 90 TABLET | Refills: 0 | Status: SHIPPED | OUTPATIENT
Start: 2021-04-12 | End: 2021-09-02

## 2021-04-12 RX ORDER — LEVOTHYROXINE SODIUM 0.03 MG/1
25 TABLET ORAL
Qty: 90 TABLET | Refills: 0 | OUTPATIENT
Start: 2021-04-12

## 2021-04-12 NOTE — TELEPHONE ENCOUNTER
Rx Request  Sertraline HCl 50 MG Oral Tab    Disp:      90              R: 1    Associated Dx:  Anxiety & Depression    Last Refilled: [id number]     Last Visit: 10/05/2020

## 2021-04-18 DIAGNOSIS — F32.A ANXIETY AND DEPRESSION: ICD-10-CM

## 2021-04-18 DIAGNOSIS — F41.9 ANXIETY AND DEPRESSION: ICD-10-CM

## 2021-04-19 RX ORDER — BUPROPION HYDROCHLORIDE 300 MG/1
300 TABLET ORAL DAILY
Qty: 90 TABLET | Refills: 1 | Status: SHIPPED | OUTPATIENT
Start: 2021-04-19 | End: 2021-10-11

## 2021-04-19 RX ORDER — BUPROPION HYDROCHLORIDE 300 MG/1
TABLET ORAL
Qty: 90 TABLET | Refills: 0 | OUTPATIENT
Start: 2021-04-19

## 2021-06-09 ENCOUNTER — HOSPITAL ENCOUNTER (OUTPATIENT)
Dept: GENERAL RADIOLOGY | Age: 56
Discharge: HOME | End: 2021-06-09
Attending: PHYSICIAN ASSISTANT
Payer: COMMERCIAL

## 2021-06-09 ENCOUNTER — OFFICE VISIT (OUTPATIENT)
Dept: FAMILY MEDICINE CLINIC | Facility: CLINIC | Age: 56
End: 2021-06-09
Payer: COMMERCIAL

## 2021-06-09 ENCOUNTER — LAB ENCOUNTER (OUTPATIENT)
Dept: LAB | Age: 56
End: 2021-06-09
Attending: PHYSICIAN ASSISTANT
Payer: COMMERCIAL

## 2021-06-09 VITALS
DIASTOLIC BLOOD PRESSURE: 72 MMHG | SYSTOLIC BLOOD PRESSURE: 116 MMHG | WEIGHT: 174 LBS | RESPIRATION RATE: 20 BRPM | HEART RATE: 74 BPM | OXYGEN SATURATION: 95 % | HEIGHT: 67 IN | TEMPERATURE: 98 F | BODY MASS INDEX: 27.31 KG/M2

## 2021-06-09 DIAGNOSIS — F32.A ANXIETY AND DEPRESSION: ICD-10-CM

## 2021-06-09 DIAGNOSIS — M79.671 RIGHT FOOT PAIN: ICD-10-CM

## 2021-06-09 DIAGNOSIS — E78.2 MIXED HYPERLIPIDEMIA: ICD-10-CM

## 2021-06-09 DIAGNOSIS — E03.8 OTHER SPECIFIED HYPOTHYROIDISM: ICD-10-CM

## 2021-06-09 DIAGNOSIS — N95.1 MENOPAUSAL SYMPTOMS: ICD-10-CM

## 2021-06-09 DIAGNOSIS — R53.83 FATIGUE, UNSPECIFIED TYPE: ICD-10-CM

## 2021-06-09 DIAGNOSIS — IMO0002 CHRONIC MIGRAINE: ICD-10-CM

## 2021-06-09 DIAGNOSIS — M25.50 MULTIPLE JOINT PAIN: ICD-10-CM

## 2021-06-09 DIAGNOSIS — F41.9 ANXIETY AND DEPRESSION: ICD-10-CM

## 2021-06-09 DIAGNOSIS — R53.83 FATIGUE, UNSPECIFIED TYPE: Primary | ICD-10-CM

## 2021-06-09 PROCEDURE — 82306 VITAMIN D 25 HYDROXY: CPT

## 2021-06-09 PROCEDURE — 86038 ANTINUCLEAR ANTIBODIES: CPT

## 2021-06-09 PROCEDURE — 36415 COLL VENOUS BLD VENIPUNCTURE: CPT

## 2021-06-09 PROCEDURE — 3008F BODY MASS INDEX DOCD: CPT | Performed by: PHYSICIAN ASSISTANT

## 2021-06-09 PROCEDURE — 99214 OFFICE O/P EST MOD 30 MIN: CPT | Performed by: PHYSICIAN ASSISTANT

## 2021-06-09 PROCEDURE — 3074F SYST BP LT 130 MM HG: CPT | Performed by: PHYSICIAN ASSISTANT

## 2021-06-09 PROCEDURE — 80061 LIPID PANEL: CPT

## 2021-06-09 PROCEDURE — 82670 ASSAY OF TOTAL ESTRADIOL: CPT

## 2021-06-09 PROCEDURE — 80053 COMPREHEN METABOLIC PANEL: CPT

## 2021-06-09 PROCEDURE — 85652 RBC SED RATE AUTOMATED: CPT

## 2021-06-09 PROCEDURE — 73630 X-RAY EXAM OF FOOT: CPT | Performed by: PHYSICIAN ASSISTANT

## 2021-06-09 PROCEDURE — 83002 ASSAY OF GONADOTROPIN (LH): CPT

## 2021-06-09 PROCEDURE — 85025 COMPLETE CBC W/AUTO DIFF WBC: CPT

## 2021-06-09 PROCEDURE — 82607 VITAMIN B-12: CPT

## 2021-06-09 PROCEDURE — 86140 C-REACTIVE PROTEIN: CPT

## 2021-06-09 PROCEDURE — 84443 ASSAY THYROID STIM HORMONE: CPT

## 2021-06-09 PROCEDURE — 84439 ASSAY OF FREE THYROXINE: CPT

## 2021-06-09 PROCEDURE — 3078F DIAST BP <80 MM HG: CPT | Performed by: PHYSICIAN ASSISTANT

## 2021-06-09 PROCEDURE — 83001 ASSAY OF GONADOTROPIN (FSH): CPT

## 2021-06-09 RX ORDER — BUTALBITAL, ACETAMINOPHEN AND CAFFEINE 50; 325; 40 MG/1; MG/1; MG/1
TABLET ORAL
Qty: 30 TABLET | Refills: 2 | Status: SHIPPED | OUTPATIENT
Start: 2021-06-09 | End: 2021-10-19

## 2021-06-09 RX ORDER — ATORVASTATIN CALCIUM 40 MG/1
40 TABLET, FILM COATED ORAL NIGHTLY
Qty: 90 TABLET | Refills: 1 | Status: SHIPPED | OUTPATIENT
Start: 2021-06-09 | End: 2022-01-10

## 2021-06-09 NOTE — PROGRESS NOTES
HPI/Subjective:   Patient ID: Aramis Davis is a 49year old female. HPI   Patient presents today with multiple concerns. She is feeling general fatigue recently. She has history of hypothyroidism and TSH was stable when last checked.   She is arthralgias. Negative for gait problem and myalgias. Skin: Negative for rash. Neurological: Positive for headaches. Negative for weakness and light-headedness. Hematological: Negative for adenopathy.    Psychiatric/Behavioral: Positive for sleep distu Palpations: Abdomen is soft. There is no mass. Tenderness: There is no abdominal tenderness. Musculoskeletal:      Right hand: Bony tenderness (right 5th finger PIP) present. Cervical back: Normal range of motion and neck supple.       Right hip swelling.   - XR FOOT, COMPLETE (MIN 3 VIEWS), RIGHT (CPT=69579); Future    7. Anxiety and depression  Reasonably controlled. Continue current medications. Follow up in 6 months. Sooner prn.     8. Chronic migraine  Recently worsening.  Refill of Fioricet g

## 2021-08-12 ENCOUNTER — HOSPITAL ENCOUNTER (OUTPATIENT)
Dept: MAMMOGRAPHY | Age: 56
Discharge: HOME | End: 2021-08-12
Attending: SURGERY
Payer: COMMERCIAL

## 2021-08-12 DIAGNOSIS — N60.92 ATYPICAL DUCTAL HYPERPLASIA OF LEFT BREAST: ICD-10-CM

## 2021-08-12 PROCEDURE — 77061 BREAST TOMOSYNTHESIS UNI: CPT | Performed by: SURGERY

## 2021-08-12 PROCEDURE — 77065 DX MAMMO INCL CAD UNI: CPT | Performed by: SURGERY

## 2021-08-13 NOTE — PROGRESS NOTES
Called patient. Appointment made.     Future Appointments  9/1/2021   4:15 PM    Donnis Ormond, MD    [id number]        [facility] [address]

## 2021-09-01 ENCOUNTER — OFFICE VISIT (OUTPATIENT)
Dept: SURGERY | Facility: CLINIC | Age: 56
End: 2021-09-01
Payer: COMMERCIAL

## 2021-09-01 VITALS — BODY MASS INDEX: 27.47 KG/M2 | HEIGHT: 67 IN | WEIGHT: 175 LBS | TEMPERATURE: 99 F

## 2021-09-01 DIAGNOSIS — E03.9 HYPOTHYROIDISM, UNSPECIFIED TYPE: ICD-10-CM

## 2021-09-01 DIAGNOSIS — N60.92 ATYPICAL DUCTAL HYPERPLASIA OF LEFT BREAST: Primary | ICD-10-CM

## 2021-09-01 PROCEDURE — 3008F BODY MASS INDEX DOCD: CPT | Performed by: SURGERY

## 2021-09-01 PROCEDURE — 99212 OFFICE O/P EST SF 10 MIN: CPT | Performed by: SURGERY

## 2021-09-01 NOTE — PROGRESS NOTES
Follow Up Visit Note       Active Problems      1. Atypical ductal hyperplasia of left breast          Chief Complaint   Patient presents with:   Follow Up To Mammogram: EP ally and exam- PT denies any new concerns         History of Present Illness  The pat Yes          2 cups of coffee daily        Exercise: Yes          1 x/week       Current Outpatient Medications:   •  atorvastatin 40 MG Oral Tab, Take 1 tablet (40 mg total) by mouth nightly., Disp: 90 tablet, Rfl: 1  •  Butalbital-APAP-Caffeine -40 distress. Appearance: She is well-developed. She is not diaphoretic. HENT:      Head: Normocephalic and atraumatic. Eyes:      General: No scleral icterus.      Conjunctiva/sclera: Conjunctivae normal.      Pupils: Pupils are equal, round, and react has a benign mammogram and exam.  · She will be due for a bilateral mammogram in February 2021. The order was placed at today's visit. · She should undergo an annual clinical breast examination.   I am happy to see her back for that examination, unless sh

## 2021-09-02 RX ORDER — LEVOTHYROXINE SODIUM 25 UG/1
TABLET ORAL
Qty: 30 TABLET | Refills: 0 | Status: SHIPPED | OUTPATIENT
Start: 2021-09-02 | End: 2021-10-11

## 2021-10-11 DIAGNOSIS — E03.9 HYPOTHYROIDISM, UNSPECIFIED TYPE: ICD-10-CM

## 2021-10-11 DIAGNOSIS — F41.9 ANXIETY AND DEPRESSION: ICD-10-CM

## 2021-10-11 DIAGNOSIS — F32.A ANXIETY AND DEPRESSION: ICD-10-CM

## 2021-10-11 RX ORDER — LEVOTHYROXINE SODIUM 25 UG/1
TABLET ORAL
Qty: 30 TABLET | Refills: 0 | Status: SHIPPED | OUTPATIENT
Start: 2021-10-11 | End: 2021-11-13

## 2021-10-11 RX ORDER — BUPROPION HYDROCHLORIDE 300 MG/1
300 TABLET ORAL DAILY
Qty: 90 TABLET | Refills: 1 | Status: SHIPPED | OUTPATIENT
Start: 2021-10-11 | End: 2022-04-11

## 2021-10-18 ENCOUNTER — PATIENT MESSAGE (OUTPATIENT)
Dept: FAMILY MEDICINE CLINIC | Facility: CLINIC | Age: 56
End: 2021-10-18

## 2021-10-18 ENCOUNTER — TELEMEDICINE (OUTPATIENT)
Dept: FAMILY MEDICINE CLINIC | Facility: CLINIC | Age: 56
End: 2021-10-18

## 2021-10-18 DIAGNOSIS — J04.0 LARYNGITIS: Primary | ICD-10-CM

## 2021-10-18 PROCEDURE — 99213 OFFICE O/P EST LOW 20 MIN: CPT | Performed by: PHYSICIAN ASSISTANT

## 2021-10-18 RX ORDER — AMOXICILLIN AND CLAVULANATE POTASSIUM 875; 125 MG/1; MG/1
1 TABLET, FILM COATED ORAL 2 TIMES DAILY
Qty: 20 TABLET | Refills: 0 | Status: SHIPPED | OUTPATIENT
Start: 2021-10-18 | End: 2021-10-28

## 2021-10-18 NOTE — PROGRESS NOTES
Video Visit    Juve Underwood is a 49year old female. No chief complaint on file. HPI:   Patient presents feeling unwell for the last week. She has had headaches, body aches, postnasal drip, cough, chest congestion, and loss of voice.   She fee Comment: occ    Drug use: No       REVIEW OF SYSTEMS:   GENERAL HEALTH: Denies fevers, chills, sweats, and fatigue. EYES: Denies vision changes, eye redness, itching, or drainage. HENT: +loss of voice.  Denies hearing loss, ear pain, nasal congestion, si telehealth visit, including treatment limitations as no physical exam could be performed. The patient was advised to call 911 or to go to the ER in case there was an emergency.   The patient was also advised of the potential privacy & security concerns rel

## 2021-10-18 NOTE — PATIENT INSTRUCTIONS
Drink plenty of fluids to stay hydrated - at least 2 liters per day   Cepacol lozenges, Chloroseptic throat spray, and salt water gargles (1 tsp salt in 8 oz lukewarm water) may help with throat pain and swelling  Humidifier, steam, and Vicks as needed  OT

## 2021-10-18 NOTE — TELEPHONE ENCOUNTER
From: Koby Bello  To: Cristi Miller PA-C  Sent: 10/18/2021 8:29 AM CDT  Subject: Schedule Appt or go to Urgent Care    Hi Rubin,  I wasn't feeling well last week.  Started with body aches, headache and just gunk on my vocal chords and started losi

## 2021-10-19 ENCOUNTER — PATIENT MESSAGE (OUTPATIENT)
Dept: FAMILY MEDICINE CLINIC | Facility: CLINIC | Age: 56
End: 2021-10-19

## 2021-10-19 DIAGNOSIS — R51.9 HEADACHE DISORDER: ICD-10-CM

## 2021-10-19 RX ORDER — BUTALBITAL, ACETAMINOPHEN AND CAFFEINE 50; 325; 40 MG/1; MG/1; MG/1
TABLET ORAL
Qty: 30 TABLET | Refills: 2 | Status: SHIPPED | OUTPATIENT
Start: 2021-10-19

## 2021-10-19 NOTE — TELEPHONE ENCOUNTER
From: Tamica Bello  To: Noah Kellogg PA-C  Sent: 10/19/2021 11:25 AM CDT  Subject: Virtual Visit Follow up    Diallo Conklin,  I am going to head to [facility] shortly to  my augmentin, to play it safe.  My voice is a little better today and my cough

## 2021-11-13 DIAGNOSIS — E03.9 HYPOTHYROIDISM, UNSPECIFIED TYPE: ICD-10-CM

## 2021-11-13 RX ORDER — LEVOTHYROXINE SODIUM 25 UG/1
TABLET ORAL
Qty: 30 TABLET | Refills: 0 | Status: SHIPPED | OUTPATIENT
Start: 2021-11-13 | End: 2021-12-13

## 2021-12-12 DIAGNOSIS — E03.9 HYPOTHYROIDISM, UNSPECIFIED TYPE: ICD-10-CM

## 2021-12-13 RX ORDER — LEVOTHYROXINE SODIUM 25 UG/1
TABLET ORAL
Qty: 30 TABLET | Refills: 0 | Status: SHIPPED | OUTPATIENT
Start: 2021-12-13 | End: 2022-01-10

## 2022-01-10 DIAGNOSIS — E03.9 HYPOTHYROIDISM, UNSPECIFIED TYPE: ICD-10-CM

## 2022-01-10 DIAGNOSIS — E78.2 MIXED HYPERLIPIDEMIA: ICD-10-CM

## 2022-01-10 RX ORDER — LEVOTHYROXINE SODIUM 25 UG/1
TABLET ORAL
Qty: 30 TABLET | Refills: 0 | Status: SHIPPED | OUTPATIENT
Start: 2022-01-10

## 2022-01-10 RX ORDER — ATORVASTATIN CALCIUM 40 MG/1
TABLET, FILM COATED ORAL
Qty: 90 TABLET | Refills: 0 | Status: SHIPPED | OUTPATIENT
Start: 2022-01-10

## 2022-02-10 RX ORDER — LEVOTHYROXINE SODIUM 25 UG/1
TABLET ORAL
Qty: 30 TABLET | Refills: 0 | Status: SHIPPED | OUTPATIENT
Start: 2022-02-10 | End: 2022-03-14

## 2022-03-14 RX ORDER — LEVOTHYROXINE SODIUM 25 UG/1
TABLET ORAL
Qty: 30 TABLET | Refills: 0 | Status: SHIPPED | OUTPATIENT
Start: 2022-03-14

## 2022-04-10 DIAGNOSIS — F32.A ANXIETY AND DEPRESSION: ICD-10-CM

## 2022-04-10 DIAGNOSIS — E03.9 HYPOTHYROIDISM, UNSPECIFIED TYPE: ICD-10-CM

## 2022-04-10 DIAGNOSIS — R51.9 HEADACHE DISORDER: ICD-10-CM

## 2022-04-10 DIAGNOSIS — E78.2 MIXED HYPERLIPIDEMIA: ICD-10-CM

## 2022-04-10 DIAGNOSIS — F41.9 ANXIETY AND DEPRESSION: ICD-10-CM

## 2022-04-11 RX ORDER — ATORVASTATIN CALCIUM 40 MG/1
40 TABLET, FILM COATED ORAL NIGHTLY
Qty: 90 TABLET | Refills: 0 | OUTPATIENT
Start: 2022-04-11

## 2022-04-11 RX ORDER — LEVOTHYROXINE SODIUM 25 UG/1
TABLET ORAL
Qty: 30 TABLET | Refills: 0 | Status: SHIPPED | OUTPATIENT
Start: 2022-04-11

## 2022-04-11 RX ORDER — ATORVASTATIN CALCIUM 40 MG/1
TABLET, FILM COATED ORAL
Qty: 90 TABLET | Refills: 0 | Status: SHIPPED | OUTPATIENT
Start: 2022-04-11

## 2022-04-11 RX ORDER — BUPROPION HYDROCHLORIDE 300 MG/1
TABLET ORAL
Qty: 90 TABLET | Refills: 0 | Status: SHIPPED | OUTPATIENT
Start: 2022-04-11

## 2022-04-11 RX ORDER — BUPROPION HYDROCHLORIDE 300 MG/1
300 TABLET ORAL DAILY
Qty: 90 TABLET | Refills: 1 | OUTPATIENT
Start: 2022-04-11

## 2022-04-11 RX ORDER — BUTALBITAL, ACETAMINOPHEN AND CAFFEINE 50; 325; 40 MG/1; MG/1; MG/1
TABLET ORAL
Qty: 30 TABLET | Refills: 2 | Status: SHIPPED | OUTPATIENT
Start: 2022-04-11 | End: 2022-12-20

## 2022-04-11 RX ORDER — LEVOTHYROXINE SODIUM 0.03 MG/1
25 TABLET ORAL
Qty: 30 TABLET | Refills: 0 | OUTPATIENT
Start: 2022-04-11

## 2022-04-11 NOTE — TELEPHONE ENCOUNTER
Last ov 10/18/2021    Next ov 4/13/2022      Last refill butalbital 10/19/2021      Other refills are duplicate requets

## 2022-04-11 NOTE — TELEPHONE ENCOUNTER
Last ov 6/9/2021    Next ov 4/13/2022    Last refill atorvastatin 1/10/2022  Last refill bupropion and sertraline

## 2022-04-13 ENCOUNTER — OFFICE VISIT (OUTPATIENT)
Dept: FAMILY MEDICINE CLINIC | Facility: CLINIC | Age: 57
End: 2022-04-13
Payer: COMMERCIAL

## 2022-04-13 VITALS
WEIGHT: 170 LBS | HEART RATE: 76 BPM | BODY MASS INDEX: 26.68 KG/M2 | TEMPERATURE: 97 F | RESPIRATION RATE: 18 BRPM | OXYGEN SATURATION: 97 % | HEIGHT: 67 IN | DIASTOLIC BLOOD PRESSURE: 76 MMHG | SYSTOLIC BLOOD PRESSURE: 120 MMHG

## 2022-04-13 DIAGNOSIS — M79.10 MYALGIA: ICD-10-CM

## 2022-04-13 DIAGNOSIS — M25.561 CHRONIC PAIN OF BOTH KNEES: ICD-10-CM

## 2022-04-13 DIAGNOSIS — Z12.11 COLON CANCER SCREENING: ICD-10-CM

## 2022-04-13 DIAGNOSIS — E78.2 MIXED HYPERLIPIDEMIA: ICD-10-CM

## 2022-04-13 DIAGNOSIS — F32.A ANXIETY AND DEPRESSION: ICD-10-CM

## 2022-04-13 DIAGNOSIS — M25.562 CHRONIC PAIN OF BOTH KNEES: ICD-10-CM

## 2022-04-13 DIAGNOSIS — F41.9 ANXIETY AND DEPRESSION: ICD-10-CM

## 2022-04-13 DIAGNOSIS — R41.840 POOR CONCENTRATION: ICD-10-CM

## 2022-04-13 DIAGNOSIS — Z00.00 ROUTINE GENERAL MEDICAL EXAMINATION AT A HEALTH CARE FACILITY: Primary | ICD-10-CM

## 2022-04-13 DIAGNOSIS — Z12.31 VISIT FOR SCREENING MAMMOGRAM: ICD-10-CM

## 2022-04-13 DIAGNOSIS — G89.29 CHRONIC PAIN OF BOTH KNEES: ICD-10-CM

## 2022-04-13 PROCEDURE — 99396 PREV VISIT EST AGE 40-64: CPT | Performed by: PHYSICIAN ASSISTANT

## 2022-04-13 PROCEDURE — 3008F BODY MASS INDEX DOCD: CPT | Performed by: PHYSICIAN ASSISTANT

## 2022-04-13 PROCEDURE — 3078F DIAST BP <80 MM HG: CPT | Performed by: PHYSICIAN ASSISTANT

## 2022-04-13 PROCEDURE — 3074F SYST BP LT 130 MM HG: CPT | Performed by: PHYSICIAN ASSISTANT

## 2022-04-13 RX ORDER — DEXTROAMPHETAMINE SACCHARATE, AMPHETAMINE ASPARTATE, DEXTROAMPHETAMINE SULFATE AND AMPHETAMINE SULFATE 1.25; 1.25; 1.25; 1.25 MG/1; MG/1; MG/1; MG/1
5 TABLET ORAL DAILY
Qty: 30 TABLET | Refills: 0 | Status: SHIPPED | OUTPATIENT
Start: 2022-04-13

## 2022-04-13 RX ORDER — DICLOFENAC SODIUM 75 MG/1
75 TABLET, DELAYED RELEASE ORAL 2 TIMES DAILY
Qty: 30 TABLET | Refills: 0 | Status: SHIPPED | OUTPATIENT
Start: 2022-04-13

## 2022-04-15 ENCOUNTER — LAB ENCOUNTER (OUTPATIENT)
Dept: LAB | Age: 57
End: 2022-04-15
Attending: PHYSICIAN ASSISTANT
Payer: COMMERCIAL

## 2022-04-15 DIAGNOSIS — M79.10 MYALGIA: ICD-10-CM

## 2022-04-15 DIAGNOSIS — Z00.00 ROUTINE GENERAL MEDICAL EXAMINATION AT A HEALTH CARE FACILITY: ICD-10-CM

## 2022-04-15 LAB
ALBUMIN SERPL-MCNC: 3.7 G/DL (ref 3.4–5)
ALBUMIN/GLOB SERPL: 1.1 {RATIO} (ref 1–2)
ALP LIVER SERPL-CCNC: 70 U/L
ALT SERPL-CCNC: 26 U/L
ANION GAP SERPL CALC-SCNC: 3 MMOL/L (ref 0–18)
AST SERPL-CCNC: 10 U/L (ref 15–37)
BASOPHILS # BLD AUTO: 0.07 X10(3) UL (ref 0–0.2)
BASOPHILS NFR BLD AUTO: 1.2 %
BILIRUB SERPL-MCNC: 0.5 MG/DL (ref 0.1–2)
BUN BLD-MCNC: 15 MG/DL (ref 7–18)
CALCIUM BLD-MCNC: 9.5 MG/DL (ref 8.5–10.1)
CHLORIDE SERPL-SCNC: 106 MMOL/L (ref 98–112)
CHOLEST SERPL-MCNC: 189 MG/DL (ref ?–200)
CK SERPL-CCNC: 71 U/L
CO2 SERPL-SCNC: 30 MMOL/L (ref 21–32)
CREAT BLD-MCNC: 0.79 MG/DL
EOSINOPHIL # BLD AUTO: 0.59 X10(3) UL (ref 0–0.7)
EOSINOPHIL NFR BLD AUTO: 10.1 %
ERYTHROCYTE [DISTWIDTH] IN BLOOD BY AUTOMATED COUNT: 13.6 %
FASTING PATIENT LIPID ANSWER: YES
FASTING STATUS PATIENT QL REPORTED: YES
GLOBULIN PLAS-MCNC: 3.3 G/DL (ref 2.8–4.4)
GLUCOSE BLD-MCNC: 93 MG/DL (ref 70–99)
HCT VFR BLD AUTO: 40.1 %
HDLC SERPL-MCNC: 65 MG/DL (ref 40–59)
HGB BLD-MCNC: 12.6 G/DL
IMM GRANULOCYTES # BLD AUTO: 0.01 X10(3) UL (ref 0–1)
IMM GRANULOCYTES NFR BLD: 0.2 %
LDLC SERPL CALC-MCNC: 113 MG/DL (ref ?–100)
LYMPHOCYTES # BLD AUTO: 0.98 X10(3) UL (ref 1–4)
LYMPHOCYTES NFR BLD AUTO: 16.8 %
MCH RBC QN AUTO: 30.1 PG (ref 26–34)
MCHC RBC AUTO-ENTMCNC: 31.4 G/DL (ref 31–37)
MCV RBC AUTO: 95.9 FL
MONOCYTES # BLD AUTO: 0.74 X10(3) UL (ref 0.1–1)
MONOCYTES NFR BLD AUTO: 12.6 %
NEUTROPHILS # BLD AUTO: 3.46 X10 (3) UL (ref 1.5–7.7)
NEUTROPHILS # BLD AUTO: 3.46 X10(3) UL (ref 1.5–7.7)
NEUTROPHILS NFR BLD AUTO: 59.1 %
NONHDLC SERPL-MCNC: 124 MG/DL (ref ?–130)
OSMOLALITY SERPL CALC.SUM OF ELEC: 289 MOSM/KG (ref 275–295)
PLATELET # BLD AUTO: 268 10(3)UL (ref 150–450)
POTASSIUM SERPL-SCNC: 4.5 MMOL/L (ref 3.5–5.1)
PROT SERPL-MCNC: 7 G/DL (ref 6.4–8.2)
RBC # BLD AUTO: 4.18 X10(6)UL
SODIUM SERPL-SCNC: 139 MMOL/L (ref 136–145)
TRIGL SERPL-MCNC: 58 MG/DL (ref 30–149)
TSI SER-ACNC: 2.42 MIU/ML (ref 0.36–3.74)
VIT B12 SERPL-MCNC: 543 PG/ML (ref 193–986)
VIT D+METAB SERPL-MCNC: 33.8 NG/ML (ref 30–100)
VLDLC SERPL CALC-MCNC: 10 MG/DL (ref 0–30)
WBC # BLD AUTO: 5.9 X10(3) UL (ref 4–11)

## 2022-04-15 PROCEDURE — 82607 VITAMIN B-12: CPT

## 2022-04-15 PROCEDURE — 82306 VITAMIN D 25 HYDROXY: CPT

## 2022-04-15 PROCEDURE — 84443 ASSAY THYROID STIM HORMONE: CPT

## 2022-04-15 PROCEDURE — 80061 LIPID PANEL: CPT

## 2022-04-15 PROCEDURE — 80053 COMPREHEN METABOLIC PANEL: CPT

## 2022-04-15 PROCEDURE — 82550 ASSAY OF CK (CPK): CPT

## 2022-04-15 PROCEDURE — 85025 COMPLETE CBC W/AUTO DIFF WBC: CPT

## 2022-04-15 PROCEDURE — 36415 COLL VENOUS BLD VENIPUNCTURE: CPT

## 2022-05-15 ENCOUNTER — PATIENT MESSAGE (OUTPATIENT)
Dept: FAMILY MEDICINE CLINIC | Facility: CLINIC | Age: 57
End: 2022-05-15

## 2022-05-16 RX ORDER — DICLOFENAC SODIUM 75 MG/1
75 TABLET, DELAYED RELEASE ORAL 2 TIMES DAILY
Qty: 30 TABLET | Refills: 0 | Status: SHIPPED | OUTPATIENT
Start: 2022-05-16

## 2022-05-16 RX ORDER — LEVOTHYROXINE SODIUM 0.03 MG/1
25 TABLET ORAL
Qty: 30 TABLET | Refills: 0 | Status: SHIPPED | OUTPATIENT
Start: 2022-05-16

## 2022-05-16 NOTE — TELEPHONE ENCOUNTER
From: Freya Bello  To: Jose Senior PA-C  Sent: 5/15/2022 7:21 AM CDT  Subject: Other    Hi Emily,   I am out of town, the pharmacy back home was not able to refill my thyroid prescription, and not sure why. I left to head [place] and now I'm going on 3 days without it. I need this and the diclofenac refilled. Is it possible for this time to be sent to [facility]: [place] [facility] [place], [address] [place] [address]. Is this possible to have this called in today? Thank you,  Jacque Rosado. [phone].  Cell/text

## 2022-06-06 NOTE — OR NURSING
Paged Dr. Camden Noble for Ibuprofen RX. Pt brought in for abdominal pain for the past day. Intermittent pain, started yesterday morning and went away. Pain came back at night time. + N/V. Pt states she also is constipated. NKA. Hx of high BP.

## 2022-06-08 ENCOUNTER — PATIENT MESSAGE (OUTPATIENT)
Dept: FAMILY MEDICINE CLINIC | Facility: CLINIC | Age: 57
End: 2022-06-08

## 2022-06-09 ENCOUNTER — TELEMEDICINE (OUTPATIENT)
Dept: FAMILY MEDICINE CLINIC | Facility: CLINIC | Age: 57
End: 2022-06-09
Payer: COMMERCIAL

## 2022-06-09 DIAGNOSIS — U07.1 COVID-19: Primary | ICD-10-CM

## 2022-06-09 DIAGNOSIS — E03.9 HYPOTHYROIDISM, UNSPECIFIED TYPE: ICD-10-CM

## 2022-06-09 RX ORDER — FLUTICASONE PROPIONATE 50 MCG
2 SPRAY, SUSPENSION (ML) NASAL DAILY
Qty: 1 EACH | Refills: 1 | Status: SHIPPED | OUTPATIENT
Start: 2022-06-09

## 2022-06-09 RX ORDER — BENZONATATE 200 MG/1
200 CAPSULE ORAL EVERY 8 HOURS PRN
Qty: 30 CAPSULE | Refills: 0 | Status: SHIPPED | OUTPATIENT
Start: 2022-06-09

## 2022-06-09 RX ORDER — LEVOTHYROXINE SODIUM 0.03 MG/1
25 TABLET ORAL
Qty: 90 TABLET | Refills: 1 | Status: SHIPPED | OUTPATIENT
Start: 2022-06-09 | End: 2022-12-19

## 2022-06-09 NOTE — TELEPHONE ENCOUNTER
From: Edgardo Bello  To: Robby Jackson PA-C  Sent: 6/8/2022 7:32 PM CDT  Subject: Other    Hi Liss Blair,   I would like to take that Covid medicine, but my [address] says that you would have to request the order. My symptoms started 2 days ago. So I need to take it today or tomorrow. I've had many symptoms and was unable to sleep at all last night. It's miserable. That all came off pretty whiny :(  Would you be able to request this for me? Also a refill on my Adderall. Thank you. Deepti Casey.

## 2022-07-08 DIAGNOSIS — E78.2 MIXED HYPERLIPIDEMIA: ICD-10-CM

## 2022-07-11 RX ORDER — ATORVASTATIN CALCIUM 40 MG/1
TABLET, FILM COATED ORAL
Qty: 90 TABLET | Refills: 0 | Status: SHIPPED | OUTPATIENT
Start: 2022-07-11

## 2022-08-04 DIAGNOSIS — F32.A ANXIETY AND DEPRESSION: ICD-10-CM

## 2022-08-04 DIAGNOSIS — F41.9 ANXIETY AND DEPRESSION: ICD-10-CM

## 2022-08-04 RX ORDER — BUPROPION HYDROCHLORIDE 300 MG/1
300 TABLET ORAL DAILY
Qty: 90 TABLET | Refills: 1 | Status: SHIPPED | OUTPATIENT
Start: 2022-08-04 | End: 2023-01-30

## 2022-08-31 ENCOUNTER — HOSPITAL ENCOUNTER (OUTPATIENT)
Dept: MAMMOGRAPHY | Age: 57
Discharge: HOME | End: 2022-08-31
Attending: PHYSICIAN ASSISTANT
Payer: COMMERCIAL

## 2022-08-31 DIAGNOSIS — Z12.31 VISIT FOR SCREENING MAMMOGRAM: ICD-10-CM

## 2022-08-31 PROCEDURE — 77067 SCR MAMMO BI INCL CAD: CPT | Performed by: PHYSICIAN ASSISTANT

## 2022-08-31 PROCEDURE — 77063 BREAST TOMOSYNTHESIS BI: CPT | Performed by: PHYSICIAN ASSISTANT

## 2022-09-19 PROBLEM — K64.8 INTERNAL HEMORRHOIDS: Status: ACTIVE | Noted: 2022-09-19

## 2022-09-19 PROBLEM — Z12.11 SPECIAL SCREENING FOR MALIGNANT NEOPLASM OF COLON: Status: ACTIVE | Noted: 2022-09-19

## 2022-09-19 PROBLEM — K57.30 DIVERTICULOSIS OF COLON: Status: ACTIVE | Noted: 2022-09-19

## 2022-09-19 PROBLEM — D12.0 BENIGN NEOPLASM OF CECUM: Status: ACTIVE | Noted: 2022-09-19

## 2022-09-19 PROBLEM — K63.5 COLON POLYPS: Status: ACTIVE | Noted: 2022-09-19

## 2022-12-19 DIAGNOSIS — E03.9 HYPOTHYROIDISM, UNSPECIFIED TYPE: ICD-10-CM

## 2022-12-19 RX ORDER — LEVOTHYROXINE SODIUM 0.03 MG/1
TABLET ORAL
Qty: 90 TABLET | Refills: 0 | Status: SHIPPED | OUTPATIENT
Start: 2022-12-19

## 2022-12-20 DIAGNOSIS — R51.9 HEADACHE DISORDER: ICD-10-CM

## 2022-12-21 RX ORDER — BUTALBITAL, ACETAMINOPHEN AND CAFFEINE 50; 325; 40 MG/1; MG/1; MG/1
TABLET ORAL
Qty: 30 TABLET | Refills: 2 | Status: SHIPPED | OUTPATIENT
Start: 2022-12-21

## 2022-12-28 ENCOUNTER — LAB ENCOUNTER (OUTPATIENT)
Dept: LAB | Age: 57
End: 2022-12-28
Attending: FAMILY MEDICINE
Payer: COMMERCIAL

## 2022-12-28 ENCOUNTER — TELEMEDICINE (OUTPATIENT)
Dept: FAMILY MEDICINE CLINIC | Facility: CLINIC | Age: 57
End: 2022-12-28
Payer: COMMERCIAL

## 2022-12-28 DIAGNOSIS — M25.50 POLYARTHRALGIA: ICD-10-CM

## 2022-12-28 DIAGNOSIS — R10.12 LUQ ABDOMINAL PAIN: Primary | ICD-10-CM

## 2022-12-28 DIAGNOSIS — R10.12 LUQ ABDOMINAL PAIN: ICD-10-CM

## 2022-12-28 LAB
BASOPHILS # BLD AUTO: 0.04 X10(3) UL (ref 0–0.2)
BASOPHILS NFR BLD AUTO: 0.6 %
EOSINOPHIL # BLD AUTO: 0.11 X10(3) UL (ref 0–0.7)
EOSINOPHIL NFR BLD AUTO: 1.7 %
ERYTHROCYTE [DISTWIDTH] IN BLOOD BY AUTOMATED COUNT: 13 %
HCT VFR BLD AUTO: 41.3 %
HGB BLD-MCNC: 13.6 G/DL
IGA SERPL-MCNC: 149 MG/DL (ref 70–312)
IMM GRANULOCYTES # BLD AUTO: 0.01 X10(3) UL (ref 0–1)
IMM GRANULOCYTES NFR BLD: 0.2 %
LYMPHOCYTES # BLD AUTO: 1.01 X10(3) UL (ref 1–4)
LYMPHOCYTES NFR BLD AUTO: 15.3 %
MCH RBC QN AUTO: 30.7 PG (ref 26–34)
MCHC RBC AUTO-ENTMCNC: 32.9 G/DL (ref 31–37)
MCV RBC AUTO: 93.2 FL
MONOCYTES # BLD AUTO: 0.72 X10(3) UL (ref 0.1–1)
MONOCYTES NFR BLD AUTO: 10.9 %
NEUTROPHILS # BLD AUTO: 4.72 X10 (3) UL (ref 1.5–7.7)
NEUTROPHILS # BLD AUTO: 4.72 X10(3) UL (ref 1.5–7.7)
NEUTROPHILS NFR BLD AUTO: 71.3 %
PLATELET # BLD AUTO: 272 10(3)UL (ref 150–450)
RBC # BLD AUTO: 4.43 X10(6)UL
WBC # BLD AUTO: 6.6 X10(3) UL (ref 4–11)

## 2022-12-28 PROCEDURE — 99214 OFFICE O/P EST MOD 30 MIN: CPT | Performed by: FAMILY MEDICINE

## 2022-12-28 PROCEDURE — 86003 ALLG SPEC IGE CRUDE XTRC EA: CPT

## 2022-12-28 PROCEDURE — 86364 TISS TRNSGLTMNASE EA IG CLAS: CPT

## 2022-12-28 PROCEDURE — 36415 COLL VENOUS BLD VENIPUNCTURE: CPT

## 2022-12-28 PROCEDURE — 85025 COMPLETE CBC W/AUTO DIFF WBC: CPT

## 2022-12-28 RX ORDER — OMEPRAZOLE 40 MG/1
40 CAPSULE, DELAYED RELEASE ORAL DAILY
Qty: 14 CAPSULE | Refills: 0 | Status: SHIPPED | OUTPATIENT
Start: 2022-12-28 | End: 2023-12-23

## 2022-12-30 LAB
GLUTEN IGE QN: <0.1 KUA/L (ref ?–0.1)
TTG IGA SER-ACNC: 0.2 U/ML (ref ?–7)

## 2023-02-22 ENCOUNTER — TELEPHONE (OUTPATIENT)
Dept: FAMILY MEDICINE CLINIC | Facility: CLINIC | Age: 58
End: 2023-02-22

## 2023-03-23 DIAGNOSIS — E03.9 HYPOTHYROIDISM, UNSPECIFIED TYPE: ICD-10-CM

## 2023-03-23 RX ORDER — LEVOTHYROXINE SODIUM 0.03 MG/1
25 TABLET ORAL
Qty: 90 TABLET | Refills: 0 | Status: SHIPPED | OUTPATIENT
Start: 2023-03-23

## 2023-04-10 ENCOUNTER — EKG ENCOUNTER (OUTPATIENT)
Dept: LAB | Age: 58
End: 2023-04-10
Attending: FAMILY MEDICINE
Payer: COMMERCIAL

## 2023-04-10 ENCOUNTER — OFFICE VISIT (OUTPATIENT)
Dept: FAMILY MEDICINE CLINIC | Facility: CLINIC | Age: 58
End: 2023-04-10
Payer: COMMERCIAL

## 2023-04-10 VITALS
RESPIRATION RATE: 16 BRPM | OXYGEN SATURATION: 98 % | DIASTOLIC BLOOD PRESSURE: 84 MMHG | WEIGHT: 173 LBS | HEIGHT: 67 IN | SYSTOLIC BLOOD PRESSURE: 126 MMHG | HEART RATE: 63 BPM | BODY MASS INDEX: 27.15 KG/M2

## 2023-04-10 DIAGNOSIS — E78.2 MIXED HYPERLIPIDEMIA: ICD-10-CM

## 2023-04-10 DIAGNOSIS — Z01.818 PREOP EXAMINATION: ICD-10-CM

## 2023-04-10 DIAGNOSIS — F32.A ANXIETY AND DEPRESSION: ICD-10-CM

## 2023-04-10 DIAGNOSIS — F41.9 ANXIETY AND DEPRESSION: ICD-10-CM

## 2023-04-10 DIAGNOSIS — M20.21 HALLUX RIGIDUS OF RIGHT FOOT: ICD-10-CM

## 2023-04-10 DIAGNOSIS — Z01.818 PREOP EXAMINATION: Primary | ICD-10-CM

## 2023-04-10 LAB
ATRIAL RATE: 59 BPM
P AXIS: 74 DEGREES
P-R INTERVAL: 170 MS
Q-T INTERVAL: 416 MS
QRS DURATION: 114 MS
QTC CALCULATION (BEZET): 411 MS
R AXIS: 41 DEGREES
T AXIS: 57 DEGREES
VENTRICULAR RATE: 59 BPM

## 2023-04-10 PROCEDURE — 3008F BODY MASS INDEX DOCD: CPT | Performed by: FAMILY MEDICINE

## 2023-04-10 PROCEDURE — 99213 OFFICE O/P EST LOW 20 MIN: CPT | Performed by: FAMILY MEDICINE

## 2023-04-10 PROCEDURE — 90471 IMMUNIZATION ADMIN: CPT | Performed by: FAMILY MEDICINE

## 2023-04-10 PROCEDURE — 3074F SYST BP LT 130 MM HG: CPT | Performed by: FAMILY MEDICINE

## 2023-04-10 PROCEDURE — 90750 HZV VACC RECOMBINANT IM: CPT | Performed by: FAMILY MEDICINE

## 2023-04-10 PROCEDURE — 3079F DIAST BP 80-89 MM HG: CPT | Performed by: FAMILY MEDICINE

## 2023-04-10 RX ORDER — FLUTICASONE PROPIONATE 50 MCG
2 SPRAY, SUSPENSION (ML) NASAL DAILY
Qty: 1 EACH | Refills: 1 | Status: SHIPPED | OUTPATIENT
Start: 2023-04-10

## 2023-04-10 RX ORDER — SERTRALINE HYDROCHLORIDE 100 MG/1
100 TABLET, FILM COATED ORAL DAILY
Qty: 30 TABLET | Refills: 1 | Status: SHIPPED | OUTPATIENT
Start: 2023-04-10

## 2023-04-24 ENCOUNTER — LAB ENCOUNTER (OUTPATIENT)
Dept: LAB | Age: 58
End: 2023-04-24
Attending: FAMILY MEDICINE
Payer: COMMERCIAL

## 2023-04-24 DIAGNOSIS — E78.2 MIXED HYPERLIPIDEMIA: ICD-10-CM

## 2023-04-24 DIAGNOSIS — Z01.818 PREOP EXAMINATION: ICD-10-CM

## 2023-04-24 DIAGNOSIS — M20.21 HALLUX RIGIDUS OF RIGHT FOOT: ICD-10-CM

## 2023-04-24 LAB
ALBUMIN SERPL-MCNC: 3.9 G/DL (ref 3.4–5)
ALBUMIN/GLOB SERPL: 1.1 {RATIO} (ref 1–2)
ALP LIVER SERPL-CCNC: 73 U/L
ALT SERPL-CCNC: 17 U/L
ANION GAP SERPL CALC-SCNC: 2 MMOL/L (ref 0–18)
AST SERPL-CCNC: 18 U/L (ref 15–37)
BASOPHILS # BLD AUTO: 0.05 X10(3) UL (ref 0–0.2)
BASOPHILS NFR BLD AUTO: 0.9 %
BILIRUB SERPL-MCNC: 0.4 MG/DL (ref 0.1–2)
BUN BLD-MCNC: 12 MG/DL (ref 7–18)
CALCIUM BLD-MCNC: 9.6 MG/DL (ref 8.5–10.1)
CHLORIDE SERPL-SCNC: 108 MMOL/L (ref 98–112)
CHOLEST SERPL-MCNC: 223 MG/DL (ref ?–200)
CO2 SERPL-SCNC: 30 MMOL/L (ref 21–32)
CREAT BLD-MCNC: 0.71 MG/DL
EOSINOPHIL # BLD AUTO: 0.16 X10(3) UL (ref 0–0.7)
EOSINOPHIL NFR BLD AUTO: 2.8 %
ERYTHROCYTE [DISTWIDTH] IN BLOOD BY AUTOMATED COUNT: 13.4 %
FASTING PATIENT LIPID ANSWER: YES
FASTING STATUS PATIENT QL REPORTED: YES
GFR SERPLBLD BASED ON 1.73 SQ M-ARVRAT: 100 ML/MIN/1.73M2 (ref 60–?)
GLOBULIN PLAS-MCNC: 3.5 G/DL (ref 2.8–4.4)
GLUCOSE BLD-MCNC: 100 MG/DL (ref 70–99)
HCT VFR BLD AUTO: 40.4 %
HDLC SERPL-MCNC: 64 MG/DL (ref 40–59)
HGB BLD-MCNC: 13.3 G/DL
IMM GRANULOCYTES # BLD AUTO: 0.01 X10(3) UL (ref 0–1)
IMM GRANULOCYTES NFR BLD: 0.2 %
LDLC SERPL CALC-MCNC: 139 MG/DL (ref ?–100)
LYMPHOCYTES # BLD AUTO: 1.14 X10(3) UL (ref 1–4)
LYMPHOCYTES NFR BLD AUTO: 19.8 %
MCH RBC QN AUTO: 30.6 PG (ref 26–34)
MCHC RBC AUTO-ENTMCNC: 32.9 G/DL (ref 31–37)
MCV RBC AUTO: 92.9 FL
MONOCYTES # BLD AUTO: 0.67 X10(3) UL (ref 0.1–1)
MONOCYTES NFR BLD AUTO: 11.7 %
NEUTROPHILS # BLD AUTO: 3.72 X10 (3) UL (ref 1.5–7.7)
NEUTROPHILS # BLD AUTO: 3.72 X10(3) UL (ref 1.5–7.7)
NEUTROPHILS NFR BLD AUTO: 64.6 %
NONHDLC SERPL-MCNC: 159 MG/DL (ref ?–130)
OSMOLALITY SERPL CALC.SUM OF ELEC: 290 MOSM/KG (ref 275–295)
PLATELET # BLD AUTO: 266 10(3)UL (ref 150–450)
POTASSIUM SERPL-SCNC: 4 MMOL/L (ref 3.5–5.1)
PROT SERPL-MCNC: 7.4 G/DL (ref 6.4–8.2)
RBC # BLD AUTO: 4.35 X10(6)UL
SODIUM SERPL-SCNC: 140 MMOL/L (ref 136–145)
TRIGL SERPL-MCNC: 112 MG/DL (ref 30–149)
VLDLC SERPL CALC-MCNC: 21 MG/DL (ref 0–30)
WBC # BLD AUTO: 5.8 X10(3) UL (ref 4–11)

## 2023-04-24 PROCEDURE — 36415 COLL VENOUS BLD VENIPUNCTURE: CPT

## 2023-04-24 PROCEDURE — 85025 COMPLETE CBC W/AUTO DIFF WBC: CPT

## 2023-04-24 PROCEDURE — 80061 LIPID PANEL: CPT

## 2023-04-24 PROCEDURE — 80053 COMPREHEN METABOLIC PANEL: CPT

## 2023-05-19 DIAGNOSIS — R51.9 HEADACHE DISORDER: ICD-10-CM

## 2023-05-19 RX ORDER — BUTALBITAL, ACETAMINOPHEN AND CAFFEINE 50; 325; 40 MG/1; MG/1; MG/1
TABLET ORAL
Qty: 30 TABLET | Refills: 2 | Status: SHIPPED | OUTPATIENT
Start: 2023-05-19

## 2023-06-23 DIAGNOSIS — F41.9 ANXIETY AND DEPRESSION: ICD-10-CM

## 2023-06-23 DIAGNOSIS — E03.9 HYPOTHYROIDISM, UNSPECIFIED TYPE: ICD-10-CM

## 2023-06-23 DIAGNOSIS — F32.A ANXIETY AND DEPRESSION: ICD-10-CM

## 2023-06-23 RX ORDER — SERTRALINE HYDROCHLORIDE 100 MG/1
TABLET, FILM COATED ORAL
Qty: 30 TABLET | Refills: 1 | Status: SHIPPED | OUTPATIENT
Start: 2023-06-23

## 2023-06-23 RX ORDER — SERTRALINE HYDROCHLORIDE 100 MG/1
100 TABLET, FILM COATED ORAL DAILY
Qty: 30 TABLET | Refills: 1 | Status: SHIPPED | OUTPATIENT
Start: 2023-06-23 | End: 2023-06-23

## 2023-06-23 RX ORDER — LEVOTHYROXINE SODIUM 0.03 MG/1
TABLET ORAL
Qty: 90 TABLET | Refills: 0 | Status: SHIPPED | OUTPATIENT
Start: 2023-06-23

## 2023-06-23 RX ORDER — LEVOTHYROXINE SODIUM 0.03 MG/1
25 TABLET ORAL
Qty: 90 TABLET | Refills: 0 | Status: SHIPPED | OUTPATIENT
Start: 2023-06-23

## 2023-06-23 NOTE — TELEPHONE ENCOUNTER
Patient comment: The last refill was mistakenly filled under the old dose of 50MG, so elvi had to double up. Heading out of town next week & dont want to forget upon my return.      LV:4/10/2023  LR:4/10/.2023

## 2023-08-22 DIAGNOSIS — F41.9 ANXIETY AND DEPRESSION: ICD-10-CM

## 2023-08-22 DIAGNOSIS — F32.A ANXIETY AND DEPRESSION: ICD-10-CM

## 2023-08-23 NOTE — TELEPHONE ENCOUNTER
.A refill request was received for:  Requested Prescriptions     Pending Prescriptions Disp Refills    sertraline 100 MG Oral Tab 30 tablet 1     Sig: Take 1 tablet (100 mg total) by mouth daily. Last refill date:   6/23/2023    Last office visit: 4/10/2023    Follow up due:  No future appointments.

## 2023-08-25 RX ORDER — SERTRALINE HYDROCHLORIDE 100 MG/1
100 TABLET, FILM COATED ORAL DAILY
Qty: 90 TABLET | Refills: 1 | Status: SHIPPED | OUTPATIENT
Start: 2023-08-25

## 2023-12-19 ENCOUNTER — TELEPHONE (OUTPATIENT)
Dept: FAMILY MEDICINE CLINIC | Facility: CLINIC | Age: 58
End: 2023-12-19

## 2023-12-19 NOTE — TELEPHONE ENCOUNTER
Appt 12/23/23,     Do you want any labs prior? Pt has been off her cholesterol and thyroid medications.

## 2023-12-19 NOTE — TELEPHONE ENCOUNTER
Does not have a lot of energy and not feeling well and wants full work up of labs ordered. Maybe hormone levels are off? Has not taken her cholesterol or thyroid med for months, wanted to see how she felt without taking them. Advised pt Dr Shauna Sauceda want to speak with her prior to ordering any labs, scheduled her for virtual 12/23, pt can cancel if not needed.

## 2023-12-20 NOTE — TELEPHONE ENCOUNTER
Patient states she will keep VV with Dr Christina Blevins on 12/23/23. Future labs will be discussed at time of visit.

## 2023-12-23 ENCOUNTER — TELEMEDICINE (OUTPATIENT)
Dept: FAMILY MEDICINE CLINIC | Facility: CLINIC | Age: 58
End: 2023-12-23
Payer: COMMERCIAL

## 2023-12-23 DIAGNOSIS — E03.9 HYPOTHYROIDISM, UNSPECIFIED TYPE: ICD-10-CM

## 2023-12-23 DIAGNOSIS — R51.9 CHRONIC NONINTRACTABLE HEADACHE, UNSPECIFIED HEADACHE TYPE: ICD-10-CM

## 2023-12-23 DIAGNOSIS — R14.0 BLOATING: ICD-10-CM

## 2023-12-23 DIAGNOSIS — Z00.00 ROUTINE PHYSICL LAB EXAM: ICD-10-CM

## 2023-12-23 DIAGNOSIS — G89.29 CHRONIC NONINTRACTABLE HEADACHE, UNSPECIFIED HEADACHE TYPE: ICD-10-CM

## 2023-12-23 DIAGNOSIS — R53.82 CHRONIC FATIGUE: Primary | ICD-10-CM

## 2023-12-23 NOTE — PROGRESS NOTES
Subjective:   Patient ID: Jerrica Hermosillo is a 66year old female. Chronic fatigue, chronic headache, chronic bloating. Wants to get extensive testing done to see why this is.  + GERD symptoms also. Has stopped her statin and thyroid medication to see if that helps. History/Other:   Review of Systems   All other systems reviewed and are negative. Current Outpatient Medications   Medication Sig Dispense Refill    sertraline 100 MG Oral Tab Take 1 tablet (100 mg total) by mouth daily. 90 tablet 1    levothyroxine 25 MCG Oral Tab Take 1 tablet (25 mcg total) by mouth before breakfast. 90 tablet 0    LEVOTHYROXINE 25 MCG Oral Tab TAKE 1 TABLET(25 MCG) BY MOUTH BEFORE BREAKFAST 90 tablet 0    butalbital-acetaminophen-caffeine -40 MG Oral Tab TAKE 1 TABLET BY MOUTH EVERY 4 HOURS AS NEEDED FOR PAIN 30 tablet 2    fluticasone propionate 50 MCG/ACT Nasal Suspension 2 sprays by Each Nare route daily. 1 each 1    ALPRAZolam (XANAX) 0.25 MG Oral Tab Take 1 tablet (0.25 mg total) by mouth 2 (two) times daily as needed for Anxiety. 30 tablet 0    simvastatin 40 MG Oral Tab Take 1 tablet (40 mg total) by mouth nightly. 90 tablet 3     Allergies:No Known Allergies    Objective:   Physical Exam  Video visit    Assessment & Plan:   1. Chronic fatigue    2. Routine physicl lab exam    3. Bloating    4. Chronic nonintractable headache, unspecified headache type    5. Hypothyroidism, unspecified type      Wants to try to get off of the sertraline. 3 weeks of 50mg sertraline then 2 weeks of 25 mg sertraline, then stop it.   Get testing done  Follow up for a physical  Do GERD diet  See gastroenterologist    Meds This Visit:  Requested Prescriptions      No prescriptions requested or ordered in this encounter       Imaging & Referrals:  [facility] - INTERNAL

## 2023-12-26 ENCOUNTER — LAB ENCOUNTER (OUTPATIENT)
Dept: LAB | Age: 58
End: 2023-12-26
Attending: FAMILY MEDICINE
Payer: COMMERCIAL

## 2023-12-26 DIAGNOSIS — R53.82 CHRONIC FATIGUE: ICD-10-CM

## 2023-12-26 DIAGNOSIS — Z00.00 ROUTINE PHYSICL LAB EXAM: ICD-10-CM

## 2023-12-26 DIAGNOSIS — R73.01 ABNORMAL FASTING GLUCOSE: ICD-10-CM

## 2023-12-26 DIAGNOSIS — E03.9 HYPOTHYROIDISM, UNSPECIFIED TYPE: ICD-10-CM

## 2023-12-26 LAB
ALBUMIN SERPL-MCNC: 3.6 G/DL (ref 3.4–5)
ALBUMIN/GLOB SERPL: 1 {RATIO} (ref 1–2)
ALP LIVER SERPL-CCNC: 74 U/L
ALT SERPL-CCNC: 26 U/L
ANION GAP SERPL CALC-SCNC: 7 MMOL/L (ref 0–18)
AST SERPL-CCNC: 11 U/L (ref 15–37)
BASOPHILS # BLD AUTO: 0.04 X10(3) UL (ref 0–0.2)
BASOPHILS NFR BLD AUTO: 0.8 %
BILIRUB SERPL-MCNC: 0.6 MG/DL (ref 0.1–2)
BUN BLD-MCNC: 11 MG/DL (ref 9–23)
CALCIUM BLD-MCNC: 8.9 MG/DL (ref 8.5–10.1)
CHLORIDE SERPL-SCNC: 106 MMOL/L (ref 98–112)
CHOLEST SERPL-MCNC: 386 MG/DL (ref ?–200)
CO2 SERPL-SCNC: 26 MMOL/L (ref 21–32)
CORTIS SERPL-MCNC: 20.9 UG/DL
CREAT BLD-MCNC: 0.77 MG/DL
CRP SERPL-MCNC: <0.29 MG/DL (ref ?–0.3)
DEPRECATED HBV CORE AB SER IA-ACNC: 51.8 NG/ML
EGFRCR SERPLBLD CKD-EPI 2021: 90 ML/MIN/1.73M2 (ref 60–?)
EOSINOPHIL # BLD AUTO: 0.1 X10(3) UL (ref 0–0.7)
EOSINOPHIL NFR BLD AUTO: 2 %
ERYTHROCYTE [DISTWIDTH] IN BLOOD BY AUTOMATED COUNT: 13.6 %
ERYTHROCYTE [SEDIMENTATION RATE] IN BLOOD: 22 MM/HR
FASTING PATIENT LIPID ANSWER: YES
FASTING STATUS PATIENT QL REPORTED: YES
GLOBULIN PLAS-MCNC: 3.6 G/DL (ref 2.8–4.4)
GLUCOSE BLD-MCNC: 103 MG/DL (ref 70–99)
HCT VFR BLD AUTO: 37.2 %
HDLC SERPL-MCNC: 69 MG/DL (ref 40–59)
HGB BLD-MCNC: 12.3 G/DL
IMM GRANULOCYTES # BLD AUTO: 0.01 X10(3) UL (ref 0–1)
IMM GRANULOCYTES NFR BLD: 0.2 %
LDLC SERPL CALC-MCNC: 313 MG/DL (ref ?–100)
LYMPHOCYTES # BLD AUTO: 1.23 X10(3) UL (ref 1–4)
LYMPHOCYTES NFR BLD AUTO: 24.4 %
MCH RBC QN AUTO: 30.4 PG (ref 26–34)
MCHC RBC AUTO-ENTMCNC: 33.1 G/DL (ref 31–37)
MCV RBC AUTO: 92.1 FL
MONOCYTES # BLD AUTO: 0.68 X10(3) UL (ref 0.1–1)
MONOCYTES NFR BLD AUTO: 13.5 %
NEUTROPHILS # BLD AUTO: 2.99 X10 (3) UL (ref 1.5–7.7)
NEUTROPHILS # BLD AUTO: 2.99 X10(3) UL (ref 1.5–7.7)
NEUTROPHILS NFR BLD AUTO: 59.1 %
NONHDLC SERPL-MCNC: 317 MG/DL (ref ?–130)
OSMOLALITY SERPL CALC.SUM OF ELEC: 288 MOSM/KG (ref 275–295)
PLATELET # BLD AUTO: 289 10(3)UL (ref 150–450)
POTASSIUM SERPL-SCNC: 3.7 MMOL/L (ref 3.5–5.1)
PROT SERPL-MCNC: 7.2 G/DL (ref 6.4–8.2)
RBC # BLD AUTO: 4.04 X10(6)UL
RHEUMATOID FACT SERPL-ACNC: <10 IU/ML (ref ?–15)
SODIUM SERPL-SCNC: 139 MMOL/L (ref 136–145)
T3FREE SERPL-MCNC: 2.81 PG/ML (ref 2.4–4.2)
T4 FREE SERPL-MCNC: 0.6 NG/DL (ref 0.8–1.7)
THYROGLOB SERPL-MCNC: 175 U/ML (ref ?–60)
THYROPEROXIDASE AB SERPL-ACNC: 855 U/ML (ref ?–60)
TRIGL SERPL-MCNC: 53 MG/DL (ref 30–149)
TSI SER-ACNC: 6.15 MIU/ML (ref 0.36–3.74)
VIT B12 SERPL-MCNC: 529 PG/ML (ref 193–986)
VIT D+METAB SERPL-MCNC: 17.9 NG/ML (ref 30–100)
VLDLC SERPL CALC-MCNC: 14 MG/DL (ref 0–30)
WBC # BLD AUTO: 5.1 X10(3) UL (ref 4–11)

## 2023-12-26 PROCEDURE — 84481 FREE ASSAY (FT-3): CPT

## 2023-12-26 PROCEDURE — 86800 THYROGLOBULIN ANTIBODY: CPT

## 2023-12-26 PROCEDURE — 84443 ASSAY THYROID STIM HORMONE: CPT

## 2023-12-26 PROCEDURE — 82533 TOTAL CORTISOL: CPT

## 2023-12-26 PROCEDURE — 82728 ASSAY OF FERRITIN: CPT

## 2023-12-26 PROCEDURE — 82306 VITAMIN D 25 HYDROXY: CPT

## 2023-12-26 PROCEDURE — 83036 HEMOGLOBIN GLYCOSYLATED A1C: CPT

## 2023-12-26 PROCEDURE — 86038 ANTINUCLEAR ANTIBODIES: CPT

## 2023-12-26 PROCEDURE — 36415 COLL VENOUS BLD VENIPUNCTURE: CPT

## 2023-12-26 PROCEDURE — 85025 COMPLETE CBC W/AUTO DIFF WBC: CPT

## 2023-12-26 PROCEDURE — 86431 RHEUMATOID FACTOR QUANT: CPT

## 2023-12-26 PROCEDURE — 86376 MICROSOMAL ANTIBODY EACH: CPT

## 2023-12-26 PROCEDURE — 82607 VITAMIN B-12: CPT

## 2023-12-26 PROCEDURE — 84439 ASSAY OF FREE THYROXINE: CPT

## 2023-12-26 PROCEDURE — 86225 DNA ANTIBODY NATIVE: CPT

## 2023-12-26 PROCEDURE — 80053 COMPREHEN METABOLIC PANEL: CPT

## 2023-12-26 PROCEDURE — 85652 RBC SED RATE AUTOMATED: CPT

## 2023-12-26 PROCEDURE — 86140 C-REACTIVE PROTEIN: CPT

## 2023-12-26 PROCEDURE — 80061 LIPID PANEL: CPT

## 2023-12-27 ENCOUNTER — PATIENT MESSAGE (OUTPATIENT)
Dept: FAMILY MEDICINE CLINIC | Facility: CLINIC | Age: 58
End: 2023-12-27

## 2023-12-27 LAB
DSDNA IGG SERPL IA-ACNC: 0.6 IU/ML
ENA AB SER QL IA: 0.2 UG/L
ENA AB SER QL IA: NEGATIVE

## 2023-12-28 LAB
EST. AVERAGE GLUCOSE BLD GHB EST-MCNC: 117 MG/DL (ref 68–126)
HBA1C MFR BLD: 5.7 % (ref ?–5.7)

## 2023-12-28 NOTE — TELEPHONE ENCOUNTER
From: Gin Bello  To: Garth Mabry  Sent: 12/27/2023 4:12 PM CST  Subject: Test Results Question    I think a referral for an endocrinologist would be good based on these results.  I imagine you'll comment back this week yet in my labs so I don't have to wait until our appointment next year to get a referral.   Thanks  Rhett Gonzalez

## 2024-01-10 ENCOUNTER — TELEPHONE (OUTPATIENT)
Dept: FAMILY MEDICINE CLINIC | Facility: CLINIC | Age: 59
End: 2024-01-10

## 2024-01-10 NOTE — TELEPHONE ENCOUNTER
PA and form recd for Evolocumab (REPATHA SURECLICK) 140 MG/ML Subcutaneous Solution Auto-injector     KEY  [id number]

## 2024-02-05 ENCOUNTER — OFFICE VISIT (OUTPATIENT)
Dept: FAMILY MEDICINE CLINIC | Facility: CLINIC | Age: 59
End: 2024-02-05
Payer: COMMERCIAL

## 2024-02-05 VITALS
SYSTOLIC BLOOD PRESSURE: 126 MMHG | OXYGEN SATURATION: 98 % | BODY MASS INDEX: 28.41 KG/M2 | HEIGHT: 67 IN | DIASTOLIC BLOOD PRESSURE: 78 MMHG | WEIGHT: 181 LBS | HEART RATE: 66 BPM

## 2024-02-05 DIAGNOSIS — Z00.00 ANNUAL PHYSICAL EXAM: Primary | ICD-10-CM

## 2024-02-05 DIAGNOSIS — E03.9 HYPOTHYROIDISM, UNSPECIFIED TYPE: ICD-10-CM

## 2024-02-05 DIAGNOSIS — E78.00 PURE HYPERCHOLESTEROLEMIA: ICD-10-CM

## 2024-02-05 DIAGNOSIS — E55.9 VITAMIN D DEFICIENCY: ICD-10-CM

## 2024-02-05 DIAGNOSIS — I70.90 ATHEROSCLEROSIS: ICD-10-CM

## 2024-02-05 DIAGNOSIS — L98.9 SKIN LESION: ICD-10-CM

## 2024-02-05 DIAGNOSIS — Z78.0 POST-MENOPAUSAL: ICD-10-CM

## 2024-02-05 RX ORDER — EZETIMIBE 10 MG/1
10 TABLET ORAL NIGHTLY
Qty: 90 TABLET | Refills: 3 | Status: SHIPPED | OUTPATIENT
Start: 2024-02-05

## 2024-02-05 RX ORDER — ASPIRIN 81 MG/1
81 TABLET ORAL DAILY
Qty: 90 TABLET | Refills: 3 | Status: SHIPPED | OUTPATIENT
Start: 2024-02-05

## 2024-02-05 NOTE — H&P
Hilda Bello is a 58 year old female who presents for a well woman physical exam:       Patient complains of:  chronic fatigue and bodyaches.  + hypothyroidism.  Seeing endocrinologist, didn't restart thyroid medication.    Really high LDL > 300.  Didn't tolerate atorvastatin or simvastatin.  Insurance hasn't approved repatha yet.  Previous calcium score in 2020 was 800s.       Current Outpatient Medications   Medication Sig Dispense Refill    ezetimibe 10 MG Oral Tab Take 1 tablet (10 mg total) by mouth nightly. 90 tablet 3    aspirin 81 MG Oral Tab EC Take 1 tablet (81 mg total) by mouth daily. 90 tablet 3    sertraline 100 MG Oral Tab Take 1 tablet (100 mg total) by mouth daily. 90 tablet 1    Evolocumab (REPATHA SURECLICK) 140 MG/ML Subcutaneous Solution Auto-injector Inject 1 mL into the skin every 14 (fourteen) days. (Patient not taking: Reported on 2/5/2024) 2 mL 5    ergocalciferol 1.25 MG (49917 UT) Oral Cap Take 1 capsule (50,000 Units total) by mouth once a week. Once weekly x 20 weeks.  Take with food (Patient not taking: Reported on 2/5/2024) 20 capsule 0    levothyroxine 25 MCG Oral Tab Take 1 tablet (25 mcg total) by mouth before breakfast. (Patient not taking: Reported on 2/5/2024) 90 tablet 0    LEVOTHYROXINE 25 MCG Oral Tab TAKE 1 TABLET(25 MCG) BY MOUTH BEFORE BREAKFAST (Patient not taking: Reported on 2/5/2024) 90 tablet 0    butalbital-acetaminophen-caffeine -40 MG Oral Tab TAKE 1 TABLET BY MOUTH EVERY 4 HOURS AS NEEDED FOR PAIN (Patient not taking: Reported on 2/5/2024) 30 tablet 2    fluticasone propionate 50 MCG/ACT Nasal Suspension 2 sprays by Each Nare route daily. (Patient not taking: Reported on 2/5/2024) 1 each 1    ALPRAZolam (XANAX) 0.25 MG Oral Tab Take 1 tablet (0.25 mg total) by mouth 2 (two) times daily as needed for Anxiety. (Patient not taking: Reported on 2/5/2024) 30 tablet 0    simvastatin 40 MG Oral Tab Take 1 tablet (40 mg total) by mouth nightly. (Patient not  taking: Reported on 2024) 90 tablet 3      Past Medical History:   Diagnosis Date    Anxiety 2019    Anxiety state     Arthritis     Bloating     Blood disorder     ANEMIC    Depression     Disorder of thyroid     Fatigue     Headache disorder     High cholesterol     Hyperlipidemia 2016    Migraines     Night sweats     Pain in joints     Stress     Visual impairment     CONTACTS    Wears glasses     Weight gain       Past Surgical History:   Procedure Laterality Date    FRIEDA BIOPSY STEREO NODULE 1 SITE LEFT (CPT=19081) Left 2020    FRIEDA LOCALIZATION WIRE 1 SITE LEFT (CPT=19281) Left 2021    ADH      2002    OTHER      Dilation and curettage    WISDOM TEETH REMOVED      X4      Family History   Problem Relation Age of Onset    Hypertension Father     Cancer Mother         multiple myeloma    Ovarian Cancer Sister 58      Social History     Socioeconomic History    Marital status:    Tobacco Use    Smoking status: Former     Packs/day: 0.50     Years: 3.00     Additional pack years: 0.00     Total pack years: 1.50     Types: Cigarettes     Quit date: 1988     Years since quittin.7    Smokeless tobacco: Never   Vaping Use    Vaping Use: Never used   Substance and Sexual Activity    Alcohol use: Yes     Alcohol/week: 0.0 - 3.0 standard drinks of alcohol     Comment: occ    Drug use: Yes     Comment: thc gummy   Other Topics Concern    Caffeine Concern No    Stress Concern Yes    Weight Concern No    Special Diet No    Exercise No    Seat Belt Yes        Osteoperosis Prevention was discussed.  Reviewed Calcium, Vitamin D supplementation and Weight Bearing Exercises.    Patient is currently taking calcium and Vitamin D supplementation.        REVIEW OF SYSTEMS:   GENERAL: feels well otherwise  SKIN: denies any unusual skin lesions  EYES:denies blurred vision or double vision  HEENT: denies change in hearing or ear pain; denies change in vision; denies nasal congestion, sinus pain or  ST  LUNGS: denies shortness of breath or chronic cough  CV: denies chest pain, pressure or palpitations  GI: denies abdominal pain,denies heartburn  : denies dysuria; denies vaginal discharge or irritation; denies dyspareunia; denies dryness  MS: denies back pain  EXT: denies LE edema  NEURO: denies frequent headaches or dizziness  PSYCH: denies change in mood    EXAM:   /78   Pulse 66   Ht 5' 7\" (1.702 m)   Wt 181 lb (82.1 kg)   SpO2 98%   BMI 28.35 kg/m²   Body mass index is 28.35 kg/m².   GENERAL: well developed in no apparent distress  SKIN: no rash,no suspicious lesions  HEENT: atraumatic, normocephalic, TMs clear, nose and throat clear  EYES:PERRLA, EOMI, conjunctiva are clear  NECK: supple,no adenopathy, no thyromegaly  LUNGS: CTA, easy breathing  CV: S1 S2, RRR without murmur  BREASTS: Deferred.  Pt sees gynecologist.     GI: good BS's,no masses, HSM or tenderness  :Deferred.  Pt sees gynecologist.     MS: Petra, back is not tender  EXT: no edema  NEURO: Oriented times three,cranial nerves are intact,motor and sensory are grossly intact    ASSESSMENT AND PLAN:      1. Annual physical exam    2. Pure hypercholesterolemia  - ezetimibe 10 MG Oral Tab; Take 1 tablet (10 mg total) by mouth nightly.  Dispense: 90 tablet; Refill: 3  - aspirin 81 MG Oral Tab EC; Take 1 tablet (81 mg total) by mouth daily.  Dispense: 90 tablet; Refill: 3  - US CAROTID DOPPLER BILAT - DIAG IMG (CPT=93880); Future    3. Hypothyroidism, unspecified type  Restart levothyroxine after talking to endocrinologist and recheck in 2 months.    4. Vitamin D deficiency  Taking vit D.    5. Atherosclerosis  See cardiologist.  - ezetimibe 10 MG Oral Tab; Take 1 tablet (10 mg total) by mouth nightly.  Dispense: 90 tablet; Refill: 3  - aspirin 81 MG Oral Tab EC; Take 1 tablet (81 mg total) by mouth daily.  Dispense: 90 tablet; Refill: 3  - US CAROTID DOPPLER BILAT - DIAG IMG (CPT=93880); Future    6. Skin lesion  - Derm Referral - In  Network        Meds & Refills for this Visit:  Requested Prescriptions     Signed Prescriptions Disp Refills    ezetimibe 10 MG Oral Tab 90 tablet 3     Sig: Take 1 tablet (10 mg total) by mouth nightly.    aspirin 81 MG Oral Tab EC 90 tablet 3     Sig: Take 1 tablet (81 mg total) by mouth daily.       Hilda was given an opportunity to ask questions and verbalized understanding of care.  Follow up 3 months.

## 2024-02-07 ENCOUNTER — TELEPHONE (OUTPATIENT)
Dept: FAMILY MEDICINE CLINIC | Facility: CLINIC | Age: 59
End: 2024-02-07

## 2024-02-07 DIAGNOSIS — R51.9 HEADACHE DISORDER: ICD-10-CM

## 2024-02-07 NOTE — TELEPHONE ENCOUNTER
A refill request was received for:  Requested Prescriptions     Pending Prescriptions Disp Refills    BUTALBITAL-ACETAMINOPHEN-CAFFEINE -40 MG Oral Tab [Pharmacy Med Name: BUT/ACETAMINOPHEN/CAFF -40 TB] 30 tablet 0     Sig: TAKE 1 TABLET BY MOUTH EVERY 4 HOURS AS NEEDED FOR PAIN       Last refill date:   5/19/2023    Last office visit: 2/5/2023    Follow up due:  Future Appointments   Date Time Provider Department Center   2/22/2024 10:30 AM PF CT RM1 PF [facility] [place]

## 2024-02-07 NOTE — TELEPHONE ENCOUNTER
Pls call to discuss PA denial on Evolocumab (REPATHA SURECLICK) 140 MG/ML.  Pt said he should not have been denied

## 2024-02-08 RX ORDER — BUTALBITAL, ACETAMINOPHEN AND CAFFEINE 50; 325; 40 MG/1; MG/1; MG/1
TABLET ORAL
Qty: 30 TABLET | Refills: 0 | Status: SHIPPED | OUTPATIENT
Start: 2024-02-08

## 2024-02-22 ENCOUNTER — HOSPITAL ENCOUNTER (OUTPATIENT)
Dept: ULTRASOUND IMAGING | Age: 59
Discharge: HOME | End: 2024-02-22
Attending: FAMILY MEDICINE

## 2024-02-22 ENCOUNTER — HOSPITAL ENCOUNTER (OUTPATIENT)
Dept: CT IMAGING | Age: 59
Discharge: HOME | End: 2024-02-22
Attending: FAMILY MEDICINE

## 2024-02-22 VITALS — BODY MASS INDEX: 28.41 KG/M2 | WEIGHT: 181 LBS | HEIGHT: 67 IN

## 2024-02-22 DIAGNOSIS — I70.90 ATHEROSCLEROSIS: ICD-10-CM

## 2024-02-22 DIAGNOSIS — Z13.6 SCREENING FOR HEART DISEASE: ICD-10-CM

## 2024-02-22 DIAGNOSIS — E78.00 PURE HYPERCHOLESTEROLEMIA: ICD-10-CM

## 2024-02-22 PROCEDURE — 93880 EXTRACRANIAL BILAT STUDY: CPT | Performed by: FAMILY MEDICINE

## 2024-02-22 NOTE — PROGRESS NOTES
Date of Service 2024    DORA PRAJAPATI  Date of Birth 3/6/1965    Patient Age: 58 year old    PCP: Galina Huang DO   [address] [place] [address]  [address] [place] [id number]    Heart Scan Consult  Preliminary Heart Scan Score: 1134 denies CP or SOB but has been limiting activity due to fatigue and \"listening  to her body \"    Previous Screening  Heart Scan Completed Previously: Yes  Year of last heart scan:   Score of last heart scan: 862.12  Peripheral Vascular Scan Completed Previously: Yes  Year of last PV screenin (Has Carotid Doppler ordered per PCP)    No Previous Cardiology follow up   Did have a stress test in  prior to last heart scan        Risk Factors  Personal Risk Factors  Non-alterable Risk Factors: Personal History;Family History (father / brother with heart disease)  Alterable Risk Factors: Abnormal Cholesterol      Body Mass Index  Body mass index is 28.35 kg/m².    Blood Pressure  Blood Pressure measurement declined during this encounter.  (Normal =< 120/80,  Elevated = 120-129/ >80,  High Stage1 130-139/80-89 , Stage2 >140/>90)    Lipid Profile  Patient was in fasting state: Yes    Cholesterol: 386, done on 2023.  HDL Cholesterol: 69, done on 2023.  LDL Cholesterol: 313, done on 2023.  TriGlycerides 53, done on 2023.    Recently started on Repetha and ASA after intolerance to statins     Cholesterol Goals  Value   Total  =< 200   HDL  = > 45 Men = > 55 Women   LDL   =< 100   Triglycerides  =< 150       Glucose and Hemoglobin A1C  Lab Results   Component Value Date     (H) 2023    A1C 5.7 (H) 2023     (Normal Fasting Glucose < 100mg/dl )    Nurse Review  Risk factor information and results reviewed with Nurse: Yes    Recommended Follow Up:  Consult your physician regarding:: Final Heart Scan Report;Discuss potential for Incidental Finding;Exercise Program Clearance      Recommendations for Change:  Nutrition Changes: Low Saturated Fat;Low  Fat Dairy;Low Salt Eating;Increase Fiber    Cholesterol Modification (goal of therapy depends upon your risk): Decrease LDL (Lousy/Bad) Ideal <100;Decrease Total Normal <200    Exercise:  (enhance exercise after seeing cardiology)       Stress Management: Adopt Stress Management Techniques    Having carotid doppler after this test today        To Recommended Resources:        Other Resources:: Follow up with cardiology as directed by PCP has number to [facility] Dr Holley Schumacher will cll to make an appointment       Lissy ROBERTSON RN        Please Contact the Nurse Heart Line with any Questions or Concerns [phone].

## 2024-02-23 DIAGNOSIS — F32.A ANXIETY AND DEPRESSION: ICD-10-CM

## 2024-02-23 DIAGNOSIS — F41.9 ANXIETY AND DEPRESSION: ICD-10-CM

## 2024-02-23 RX ORDER — SERTRALINE HYDROCHLORIDE 100 MG/1
100 TABLET, FILM COATED ORAL DAILY
Qty: 90 TABLET | Refills: 1 | Status: SHIPPED | OUTPATIENT
Start: 2024-02-23

## 2024-03-11 NOTE — PAT NURSING NOTE
PreOp Instructions     You are scheduled for: a Cardiac Procedure     Date of Procedure: 03/18/24     Diet Instructions: Do not eat or drink anything after midnight     Medications: Take Aspirin 81 mg x 4 tablets the day of your procedure, Medications you are allowed to take can be taken with a sip of water the morning of your procedure     Skin Prep: Shower with antibacterial soap using a clean washcloth, prior to procedure     Arrival Time: The day prior to your procedure you will receive a phone call before 6:00 pm with your arrival time. If you haven't received a phone call, please check your voicemail messages., If you did not receive a voice mail and it is after 6:00 pm, please call the nursing supervisor at [phone].    Driving After Procedure: If sedation is given, you WILL NOT be able to drive home. You will need a responsible adult  to drive you home.     Discharge Teaching: Your nurse will give you specific instructions before discharge, Most people can resume normal activities in 2-3 days, Any questions, please call the physician's office

## 2024-03-18 ENCOUNTER — HOSPITAL ENCOUNTER (OUTPATIENT)
Dept: INTERVENTIONAL RADIOLOGY/VASCULAR | Facility: HOSPITAL | Age: 59
Discharge: HOME | End: 2024-03-18
Attending: INTERNAL MEDICINE
Payer: COMMERCIAL

## 2024-03-18 DIAGNOSIS — Z01.818 PRE-OP TESTING: Primary | ICD-10-CM

## 2024-03-18 DIAGNOSIS — E78.2 MIXED HYPERLIPIDEMIA: ICD-10-CM

## 2024-04-03 ENCOUNTER — LAB ENCOUNTER (OUTPATIENT)
Dept: LAB | Age: 59
End: 2024-04-03
Attending: INTERNAL MEDICINE
Payer: COMMERCIAL

## 2024-04-03 DIAGNOSIS — Z01.818 PRE-OP TESTING: ICD-10-CM

## 2024-04-03 DIAGNOSIS — D50.0 IRON DEFICIENCY ANEMIA SECONDARY TO BLOOD LOSS (CHRONIC): Primary | ICD-10-CM

## 2024-04-03 LAB
ANION GAP SERPL CALC-SCNC: 7 MMOL/L (ref 0–18)
BASOPHILS # BLD AUTO: 0.05 X10(3) UL (ref 0–0.2)
BASOPHILS NFR BLD AUTO: 1.2 %
BUN BLD-MCNC: 16 MG/DL (ref 9–23)
CALCIUM BLD-MCNC: 9.8 MG/DL (ref 8.5–10.1)
CHLORIDE SERPL-SCNC: 107 MMOL/L (ref 98–112)
CO2 SERPL-SCNC: 25 MMOL/L (ref 21–32)
CREAT BLD-MCNC: 0.75 MG/DL
EGFRCR SERPLBLD CKD-EPI 2021: 93 ML/MIN/1.73M2 (ref 60–?)
EOSINOPHIL # BLD AUTO: 0.14 X10(3) UL (ref 0–0.7)
EOSINOPHIL NFR BLD AUTO: 3.3 %
ERYTHROCYTE [DISTWIDTH] IN BLOOD BY AUTOMATED COUNT: 13.2 %
FASTING STATUS PATIENT QL REPORTED: YES
GLUCOSE BLD-MCNC: 88 MG/DL (ref 70–99)
HCT VFR BLD AUTO: 40 %
HGB BLD-MCNC: 13.4 G/DL
IMM GRANULOCYTES # BLD AUTO: 0.01 X10(3) UL (ref 0–1)
IMM GRANULOCYTES NFR BLD: 0.2 %
IRON SATN MFR SERPL: 30 %
IRON SERPL-MCNC: 110 UG/DL
LYMPHOCYTES # BLD AUTO: 1.03 X10(3) UL (ref 1–4)
LYMPHOCYTES NFR BLD AUTO: 24.1 %
MCH RBC QN AUTO: 30.5 PG (ref 26–34)
MCHC RBC AUTO-ENTMCNC: 33.5 G/DL (ref 31–37)
MCV RBC AUTO: 90.9 FL
MONOCYTES # BLD AUTO: 0.57 X10(3) UL (ref 0.1–1)
MONOCYTES NFR BLD AUTO: 13.3 %
NEUTROPHILS # BLD AUTO: 2.47 X10 (3) UL (ref 1.5–7.7)
NEUTROPHILS # BLD AUTO: 2.47 X10(3) UL (ref 1.5–7.7)
NEUTROPHILS NFR BLD AUTO: 57.9 %
OSMOLALITY SERPL CALC.SUM OF ELEC: 289 MOSM/KG (ref 275–295)
PLATELET # BLD AUTO: 277 10(3)UL (ref 150–450)
POTASSIUM SERPL-SCNC: 3.9 MMOL/L (ref 3.5–5.1)
RBC # BLD AUTO: 4.4 X10(6)UL
SODIUM SERPL-SCNC: 139 MMOL/L (ref 136–145)
TIBC SERPL-MCNC: 367 UG/DL (ref 240–450)
TRANSFERRIN SERPL-MCNC: 246 MG/DL (ref 200–360)
WBC # BLD AUTO: 4.3 X10(3) UL (ref 4–11)

## 2024-04-03 PROCEDURE — 36415 COLL VENOUS BLD VENIPUNCTURE: CPT

## 2024-04-03 PROCEDURE — 80048 BASIC METABOLIC PNL TOTAL CA: CPT

## 2024-04-03 PROCEDURE — 83550 IRON BINDING TEST: CPT

## 2024-04-03 PROCEDURE — 83540 ASSAY OF IRON: CPT

## 2024-04-03 PROCEDURE — 85025 COMPLETE CBC W/AUTO DIFF WBC: CPT

## 2024-04-05 NOTE — PAT NURSING NOTE
Per PAT encounter/MyChart message sent to pt:    PreOp Instructions     You are scheduled for: a Cardiac Procedure     Date of Procedure: 04/11/24 Thursday     Diet Instructions: Do not eat or drink anything after midnight     Medications: Take Aspirin 81 mg x 4 tablets the day of your procedure, Medications you are allowed to take can be taken with a sip of water the morning of your procedure     Skin Prep: Shower with antibacterial soap using a clean washcloth, prior to procedure     Arrival Time: The day prior to your procedure (Wednesday) you will receive a phone call before 6:00 pm with your arrival time. If you haven't received a phone call, please check your voicemail messages., If you did not receive a voice mail and it is after 6:00 pm, please call the nursing supervisor at [phone].    Driving After Procedure: If sedation is given, you WILL NOT be able to drive home. You will need a responsible adult  to drive you home., Cannot take uber or cab unless approved by physician     Discharge Teaching: Your nurse will give you specific instructions before discharge, Most people can resume normal activities in 2-3 days, Any questions, please call the physician's office      parking is available starting at 6 am or park in the [place] parking garage at [facility]. Check in at the [facility] reception desk. Our  will be there to check you in for your procedure. Please bring your insurance cards and ID with you.                                                                                                                                      Please DO NOT respond to this message, the inbasket is not monitored for messages. For any questions, please call the physician's office.

## 2024-04-08 ENCOUNTER — LAB ENCOUNTER (OUTPATIENT)
Dept: LAB | Age: 59
End: 2024-04-08
Attending: INTERNAL MEDICINE
Payer: COMMERCIAL

## 2024-04-08 DIAGNOSIS — Z82.49 FAMILY HISTORY OF ISCHEMIC HEART DISEASE: ICD-10-CM

## 2024-04-08 DIAGNOSIS — E78.01 FAMILIAL HYPERCHOLESTEROLEMIA: ICD-10-CM

## 2024-04-08 DIAGNOSIS — R94.31 NONSPECIFIC ABNORMAL ELECTROCARDIOGRAM (ECG) (EKG): Primary | ICD-10-CM

## 2024-04-08 DIAGNOSIS — E78.2 MIXED HYPERLIPIDEMIA: ICD-10-CM

## 2024-04-08 DIAGNOSIS — E03.9 HYPOTHYROIDISM, UNSPECIFIED TYPE: ICD-10-CM

## 2024-04-08 LAB
ALBUMIN SERPL-MCNC: 4.2 G/DL (ref 3.4–5)
ALBUMIN/GLOB SERPL: 1.2 {RATIO} (ref 1–2)
ALP LIVER SERPL-CCNC: 75 U/L
ALT SERPL-CCNC: 21 U/L
ANION GAP SERPL CALC-SCNC: 4 MMOL/L (ref 0–18)
AST SERPL-CCNC: 10 U/L (ref 15–37)
BILIRUB SERPL-MCNC: 0.6 MG/DL (ref 0.1–2)
BUN BLD-MCNC: 10 MG/DL (ref 9–23)
CALCIUM BLD-MCNC: 9.4 MG/DL (ref 8.5–10.1)
CHLORIDE SERPL-SCNC: 107 MMOL/L (ref 98–112)
CHOLEST SERPL-MCNC: 211 MG/DL (ref ?–200)
CO2 SERPL-SCNC: 28 MMOL/L (ref 21–32)
CREAT BLD-MCNC: 0.74 MG/DL
CRP SERPL HS-MCNC: 0.89 MG/L (ref ?–3)
EGFRCR SERPLBLD CKD-EPI 2021: 94 ML/MIN/1.73M2 (ref 60–?)
EST. AVERAGE GLUCOSE BLD GHB EST-MCNC: 117 MG/DL (ref 68–126)
FASTING PATIENT LIPID ANSWER: YES
FASTING STATUS PATIENT QL REPORTED: YES
GLOBULIN PLAS-MCNC: 3.4 G/DL (ref 2.8–4.4)
GLUCOSE BLD-MCNC: 94 MG/DL (ref 70–99)
HBA1C MFR BLD: 5.7 % (ref ?–5.7)
HDLC SERPL-MCNC: 72 MG/DL (ref 40–59)
LDLC SERPL CALC-MCNC: 127 MG/DL (ref ?–100)
NONHDLC SERPL-MCNC: 139 MG/DL (ref ?–130)
OSMOLALITY SERPL CALC.SUM OF ELEC: 287 MOSM/KG (ref 275–295)
POTASSIUM SERPL-SCNC: 4 MMOL/L (ref 3.5–5.1)
PROT SERPL-MCNC: 7.6 G/DL (ref 6.4–8.2)
SODIUM SERPL-SCNC: 139 MMOL/L (ref 136–145)
T3FREE SERPL-MCNC: 2.95 PG/ML (ref 2.4–4.2)
T4 FREE SERPL-MCNC: 0.8 NG/DL (ref 0.8–1.7)
TRIGL SERPL-MCNC: 66 MG/DL (ref 30–149)
TSI SER-ACNC: 5.5 MIU/ML (ref 0.36–3.74)
VIT D+METAB SERPL-MCNC: 95 NG/ML (ref 30–100)
VLDLC SERPL CALC-MCNC: 12 MG/DL (ref 0–30)

## 2024-04-08 PROCEDURE — 80053 COMPREHEN METABOLIC PANEL: CPT

## 2024-04-08 PROCEDURE — 84439 ASSAY OF FREE THYROXINE: CPT

## 2024-04-08 PROCEDURE — 82306 VITAMIN D 25 HYDROXY: CPT

## 2024-04-08 PROCEDURE — 84443 ASSAY THYROID STIM HORMONE: CPT

## 2024-04-08 PROCEDURE — 80061 LIPID PANEL: CPT

## 2024-04-08 PROCEDURE — 86141 C-REACTIVE PROTEIN HS: CPT

## 2024-04-08 PROCEDURE — 84481 FREE ASSAY (FT-3): CPT

## 2024-04-08 PROCEDURE — 83036 HEMOGLOBIN GLYCOSYLATED A1C: CPT

## 2024-04-08 PROCEDURE — 36415 COLL VENOUS BLD VENIPUNCTURE: CPT

## 2024-04-11 ENCOUNTER — HOSPITAL ENCOUNTER (OUTPATIENT)
Dept: INTERVENTIONAL RADIOLOGY/VASCULAR | Facility: HOSPITAL | Age: 59
Discharge: HOME | End: 2024-04-11
Attending: INTERNAL MEDICINE | Admitting: INTERNAL MEDICINE
Payer: COMMERCIAL

## 2024-04-11 VITALS
HEIGHT: 67 IN | DIASTOLIC BLOOD PRESSURE: 89 MMHG | TEMPERATURE: 98 F | RESPIRATION RATE: 15 BRPM | SYSTOLIC BLOOD PRESSURE: 126 MMHG | WEIGHT: 176 LBS | BODY MASS INDEX: 27.62 KG/M2 | OXYGEN SATURATION: 97 % | HEART RATE: 72 BPM

## 2024-04-11 DIAGNOSIS — R93.1 ELEVATED CORONARY ARTERY CALCIUM SCORE: ICD-10-CM

## 2024-04-11 DIAGNOSIS — R06.09 DOE (DYSPNEA ON EXERTION): ICD-10-CM

## 2024-04-11 DIAGNOSIS — R53.83 FATIGUE: ICD-10-CM

## 2024-04-11 PROCEDURE — 99153 MOD SED SAME PHYS/QHP EA: CPT | Performed by: INTERNAL MEDICINE

## 2024-04-11 PROCEDURE — 93458 L HRT ARTERY/VENTRICLE ANGIO: CPT | Performed by: INTERNAL MEDICINE

## 2024-04-11 PROCEDURE — B2111ZZ FLUOROSCOPY OF MULTIPLE CORONARY ARTERIES USING LOW OSMOLAR CONTRAST: ICD-10-PCS | Performed by: INTERNAL MEDICINE

## 2024-04-11 PROCEDURE — B2151ZZ FLUOROSCOPY OF LEFT HEART USING LOW OSMOLAR CONTRAST: ICD-10-PCS | Performed by: INTERNAL MEDICINE

## 2024-04-11 PROCEDURE — 99152 MOD SED SAME PHYS/QHP 5/>YRS: CPT | Performed by: INTERNAL MEDICINE

## 2024-04-11 PROCEDURE — 4A023N7 MEASUREMENT OF CARDIAC SAMPLING AND PRESSURE, LEFT HEART, PERCUTANEOUS APPROACH: ICD-10-PCS | Performed by: INTERNAL MEDICINE

## 2024-04-11 RX ORDER — HEPARIN SODIUM 5000 [USP'U]/ML
INJECTION, SOLUTION INTRAVENOUS; SUBCUTANEOUS
Status: DISCONTINUED
Start: 2024-04-11 | End: 2024-04-11 | Stop reason: WASHOUT

## 2024-04-11 RX ORDER — SODIUM CHLORIDE 9 MG/ML
INJECTION, SOLUTION INTRAVENOUS
Status: COMPLETED | OUTPATIENT
Start: 2024-04-11 | End: 2024-04-11

## 2024-04-11 RX ORDER — VERAPAMIL HYDROCHLORIDE 2.5 MG/ML
INJECTION, SOLUTION INTRAVENOUS
Status: COMPLETED
Start: 2024-04-11 | End: 2024-04-11

## 2024-04-11 RX ORDER — LIDOCAINE HYDROCHLORIDE 10 MG/ML
INJECTION, SOLUTION EPIDURAL; INFILTRATION; INTRACAUDAL; PERINEURAL
Status: COMPLETED
Start: 2024-04-11 | End: 2024-04-11

## 2024-04-11 RX ORDER — MIDAZOLAM HYDROCHLORIDE 1 MG/ML
INJECTION INTRAMUSCULAR; INTRAVENOUS
Status: COMPLETED
Start: 2024-04-11 | End: 2024-04-11

## 2024-04-11 RX ORDER — NITROGLYCERIN 20 MG/100ML
INJECTION INTRAVENOUS
Status: COMPLETED
Start: 2024-04-11 | End: 2024-04-11

## 2024-04-11 RX ORDER — HEPARIN SODIUM 5000 [USP'U]/ML
INJECTION, SOLUTION INTRAVENOUS; SUBCUTANEOUS
Status: COMPLETED
Start: 2024-04-11 | End: 2024-04-11

## 2024-04-11 RX ADMIN — SODIUM CHLORIDE: 9 INJECTION, SOLUTION INTRAVENOUS at 11:42:00

## 2024-04-11 NOTE — PROGRESS NOTES
Pt received s/p C with Dr. Felix. Right radial arterial access site closed with TR band with air in cuff. Site CDI, no bleeding or hematoma present. Dr. Felix at bedside and spoke with pt and pt's . Dr. Becerril and Dr. Oquendo at bedside and spoke with pt and pt's . TR band removed per protocol, site remains unchanged. Recovery complete. Discharge instructions given to pt and pt's . IV discontinued, pt taken down to [facility] via wheelchair for discharge.

## 2024-04-11 NOTE — PROCEDURES
[facility]    PATIENT'S NAME: DORA PRAJAPATI   ATTENDING PHYSICIAN: Holley Schumacher M.D.   OPERATING PHYSICIAN: Moses Felix M.D.   PATIENT ACCOUNT#:   [id number]    LOCATION:  [address]  MEDICAL RECORD #:   [id number]       YOB: 1965  ADMISSION DATE:       04/11/2024      OPERATION DATE:  04/11/2024    CARDIAC PROCEDURE TRANSCRIPTION      CARDIAC CATHETERIZATION     PREOPERATIVE DIAGNOSIS:    1.   Abnormal stress test suggestive of anterior ischemia.  2.   Markedly abnormal calcium score.  3.   Strong family history of coronary artery disease.    POSTOPERATIVE DIAGNOSIS:  Coronary artery disease involving an occlusion of the proximal to mid left anterior descending.  PROCEDURE PERFORMED:      SEDATION:  Patient received conscious sedation.  This was monitored by myself and the catheterization lab staff.  Conscious sedation started at 1244 and ended at 1311.    DESCRIPTION OF PROCEDURE:  After informed consent had been obtained, the patient's right radial was prepped and draped in the usual sterile fashion.  Using ultrasound, right radial was identified.  Lidocaine 1 mL was given over the right radial.  It was entered using a micropuncture needle using ultrasound.  A 4/5 Glidesheath was placed.  Verapamil 2.5 mg of and 200 mcg of nitroglycerin were given in the right radial.  Julieth right, left, and pigtail catheters were used for the diagnostic procedure.  At the end of the case, a TR band was placed over the right radial.  No apparent acute complications were noted, and patient tolerated the procedure well.    DESCRIPTION OF FINDINGS:  Fluoroscopy revealed heavy calcification of the left system, specifically the LAD and ramus systems.  Injection of the left main coronary artery did not reveal significant disease of the left main.  The left main bifurcates into the left anterior descending and circumflex coronary arteries.  The left anterior descending artery has  relative stenosis at its ostium of about 20% to 30%.  It is irregular and calcified after the takeoff of the first septal and thereafter has a 99% heavily calcified stenosis.  This heavy calcification and stenotic area extends the rest of the entire mid LAD involving the takeoff of 2 diagonal branches.  The second diagonal branch is a fair-sized branch.  Slow MARCIA 2 flow is noted throughout the LAD.  Competitive filling is noted at the apex.  The disease and calcification seem to extend over a 30 to 40 mm segment of the LAD.  Distal LAD appears to be of fair size despite being underfilled.    The circumflex coronary artery has a proximal stenosis of 50% to 70%.  It gives rise to a bifurcating obtuse marginal and posterolateral branch,  which is very tortuous yet free of significant disease.  There is a smaller posterolateral branch which is free of significant disease.    The right coronary artery is a large vessel.  Ectasia is noted proximally.  No significant disease noted in its midsection.  There is a conus intermedius that appears to fill the mid LAD.  The right coronary artery gives rise to posterior descending and posterolateral branches.  These are without significant disease.  There are collaterals from the posterior descending artery that fill the distal LAD.    VENTRICULOGRAPHY:  Ventriculography reveals normal left ventricular systolic function with an estimated ejection fraction of 60%.  Left ventricular end-diastolic pressure is 15 mmHg.  No significant gradient is noted upon pullback across the aortic valve.    SUMMARY:    1.   Severe disease with occluded left anterior descending that fills via collaterals from the conus antegrade, as well as retrograde from the posterior descending artery.  2.   At least moderate disease in the circumflex.  3.   Intact left ventricular systolic function.    RECOMMENDATION:  Given the extensive calcified complex stenosis in the LAD, the patient will get a surgical  opinion regarding bypass.    Dictated By Moses Felix M.D.  d: 04/11/2024 13:14:23  t: 04/11/2024 18:37:49  Psychiatric [id number]  AR/

## 2024-04-11 NOTE — H&P
History & Physical Examination    Patient Name: Hilda Bello  MRN: [id number]  CSN: [id number]  YOB: 1965    Diagnosis: Elevated calcium score and abnormal stress test    Present Illness: Patient presents today with an abnormal stress echo.  She has some dyspnea.  Calcium score is markedly elevated.    Medications Prior to Admission   Medication Sig Dispense Refill Last Dose    sertraline 100 MG Oral Tab Take 1 tablet (100 mg total) by mouth daily. 90 tablet 1 2024 at 0830    butalbital-acetaminophen-caffeine -40 MG Oral Tab TAKE 1 TABLET BY MOUTH EVERY 4 HOURS AS NEEDED FOR PAIN 30 tablet 0 2024 at n    ezetimibe 10 MG Oral Tab Take 1 tablet (10 mg total) by mouth nightly. 90 tablet 3 Past Week    aspirin 81 MG Oral Tab EC Take 1 tablet (81 mg total) by mouth daily. 90 tablet 3 2024 at 0830    Evolocumab (REPATHA SURECLICK) 140 MG/ML Subcutaneous Solution Auto-injector Inject 1 mL into the skin every 14 (fourteen) days. 2 mL 5 3/30/2024    ergocalciferol 1.25 MG (35422 UT) Oral Cap Take 1 capsule (50,000 Units total) by mouth once a week. Once weekly x 20 weeks.  Take with food 20 capsule 0 Past Week       Allergies: No Known Allergies    Past Medical History:    Anxiety    Anxiety state    Arthritis    Bloating    Blood disorder    ANEMIC    Depression    Disorder of thyroid    Fatigue    Headache disorder    High cholesterol    Hyperlipidemia    Migraines    Night sweats    Pain in joints    Stress    Visual impairment    CONTACTS    Wears glasses    Weight gain     Past Surgical History:   Procedure Laterality Date    Lily biopsy stereo nodule 1 site left (cpt=19081) Left 2020    Lily localization wire 1 site left (cpt=19281) Left 2021    ADH      2002    Other      Dilation and curettage    [place] teeth removed      X4     Family History   Problem Relation Age of Onset    Hypertension Father     Cancer Mother         multiple myeloma    Ovarian Cancer Sister  58     Social History     Tobacco Use    Smoking status: Former     Current packs/day: 0.00     Average packs/day: 0.5 packs/day for 3.0 years (1.5 ttl pk-yrs)     Types: Cigarettes     Start date: 1985     Quit date: 1988     Years since quittin.8    Smokeless tobacco: Never   Substance Use Topics    Alcohol use: Yes     Alcohol/week: 0.0 - 3.0 standard drinks of alcohol     Comment: occ       SYSTEM Check if Review is Normal Check if Physical Exam is Normal If not normal, please explain:   HEENT [ ] [ x]    NECK & BACK [ ] [x ]    HEART [ ] [ x]    LUNGS [ ] [ x]    ABDOMEN [ ] [ x]    UROGENITAL [ ] [ ]    EXTREMITIES [ ] [ x]    OTHER        Plan: Cardiac catheterization  Risks, benefits, and alternatives of cardiac cath/PCI discussed in detail.  Risks include but not limited to death, MI, CVA, emergency CABG, transfusion, and surgical repair of vascular complications.    Questions answered.  Patient agrees to proceed.    Moses Felix MD

## 2024-04-12 ENCOUNTER — TELEPHONE (OUTPATIENT)
Dept: CARDIOLOGY UNIT | Facility: HOSPITAL | Age: 59
End: 2024-04-12

## 2024-04-12 NOTE — PROGRESS NOTES
Pt requesting sleep aid, d/w Dr. Becerril, would recommend benadryl or melatonin, no sedatives or benzos at this point. She understands and agrees, will discuss at Monday's visit if it is not helpful.  Caitlyn Huitron RN  Clinical Coordinator  [facility]

## 2024-04-15 ENCOUNTER — HOSPITAL ENCOUNTER (OUTPATIENT)
Dept: CV DIAGNOSTICS | Facility: HOSPITAL | Age: 59
Discharge: HOME | End: 2024-04-15
Attending: THORACIC SURGERY (CARDIOTHORACIC VASCULAR SURGERY)
Payer: COMMERCIAL

## 2024-04-15 ENCOUNTER — HOSPITAL ENCOUNTER (OUTPATIENT)
Dept: CARDIOLOGY CLINIC | Facility: HOSPITAL | Age: 59
Discharge: HOME | End: 2024-04-15
Attending: THORACIC SURGERY (CARDIOTHORACIC VASCULAR SURGERY)
Payer: COMMERCIAL

## 2024-04-15 ENCOUNTER — OFFICE VISIT (OUTPATIENT)
Dept: INTERNAL MEDICINE CLINIC | Facility: CLINIC | Age: 59
End: 2024-04-15
Payer: COMMERCIAL

## 2024-04-15 ENCOUNTER — HOSPITAL ENCOUNTER (OUTPATIENT)
Dept: INTERVENTIONAL RADIOLOGY/VASCULAR | Facility: HOSPITAL | Age: 59
Discharge: HOME | End: 2024-04-15
Attending: THORACIC SURGERY (CARDIOTHORACIC VASCULAR SURGERY)
Payer: COMMERCIAL

## 2024-04-15 ENCOUNTER — HOSPITAL ENCOUNTER (OUTPATIENT)
Dept: GENERAL RADIOLOGY | Facility: HOSPITAL | Age: 59
Discharge: HOME | End: 2024-04-15
Attending: THORACIC SURGERY (CARDIOTHORACIC VASCULAR SURGERY)
Payer: COMMERCIAL

## 2024-04-15 ENCOUNTER — HOSPITAL ENCOUNTER (OUTPATIENT)
Dept: LAB | Facility: HOSPITAL | Age: 59
Discharge: HOME | End: 2024-04-15
Attending: THORACIC SURGERY (CARDIOTHORACIC VASCULAR SURGERY)
Payer: COMMERCIAL

## 2024-04-15 VITALS
HEART RATE: 80 BPM | WEIGHT: 176.25 LBS | OXYGEN SATURATION: 97 % | HEIGHT: 67 IN | RESPIRATION RATE: 18 BRPM | TEMPERATURE: 97 F | BODY MASS INDEX: 27.66 KG/M2 | DIASTOLIC BLOOD PRESSURE: 76 MMHG | SYSTOLIC BLOOD PRESSURE: 108 MMHG

## 2024-04-15 DIAGNOSIS — Z01.818 PRE-OP TESTING: ICD-10-CM

## 2024-04-15 DIAGNOSIS — I25.10 CAD (CORONARY ARTERY DISEASE), NATIVE CORONARY ARTERY: ICD-10-CM

## 2024-04-15 DIAGNOSIS — F41.9 ANXIETY: ICD-10-CM

## 2024-04-15 DIAGNOSIS — I25.10 CORONARY ARTERY DISEASE WITH HISTORY OF MYOCARDIAL INFARCTION WITHOUT HISTORY OF CABG: ICD-10-CM

## 2024-04-15 DIAGNOSIS — I25.10 CORONARY ARTERY DISEASE INVOLVING NATIVE CORONARY ARTERY OF NATIVE HEART WITHOUT ANGINA PECTORIS: Primary | ICD-10-CM

## 2024-04-15 DIAGNOSIS — I25.2 CORONARY ARTERY DISEASE WITH HISTORY OF MYOCARDIAL INFARCTION WITHOUT HISTORY OF CABG: ICD-10-CM

## 2024-04-15 DIAGNOSIS — Z91.89 AT RISK FOR BLEEDING: ICD-10-CM

## 2024-04-15 LAB
ALBUMIN SERPL-MCNC: 4.1 G/DL (ref 3.4–5)
ALBUMIN/GLOB SERPL: 1 {RATIO} (ref 1–2)
ALP LIVER SERPL-CCNC: 78 U/L
ALT SERPL-CCNC: 22 U/L
ANION GAP SERPL CALC-SCNC: 2 MMOL/L (ref 0–18)
ANTIBODY SCREEN: NEGATIVE
APTT PPP: 28.3 SECONDS (ref 23.3–35.6)
AST SERPL-CCNC: 10 U/L (ref 15–37)
ATRIAL RATE: 57 BPM
BASOPHILS # BLD AUTO: 0.03 X10(3) UL (ref 0–0.2)
BASOPHILS NFR BLD AUTO: 0.7 %
BILIRUB SERPL-MCNC: 0.6 MG/DL (ref 0.1–2)
BILIRUB UR QL STRIP.AUTO: NEGATIVE
BUN BLD-MCNC: 12 MG/DL (ref 9–23)
CALCIUM BLD-MCNC: 9.7 MG/DL (ref 8.5–10.1)
CHLORIDE SERPL-SCNC: 105 MMOL/L (ref 98–112)
CLARITY UR REFRACT.AUTO: CLEAR
CO2 SERPL-SCNC: 29 MMOL/L (ref 21–32)
CREAT BLD-MCNC: 0.82 MG/DL
EGFRCR SERPLBLD CKD-EPI 2021: 83 ML/MIN/1.73M2 (ref 60–?)
EOSINOPHIL # BLD AUTO: 0.1 X10(3) UL (ref 0–0.7)
EOSINOPHIL NFR BLD AUTO: 2.3 %
ERYTHROCYTE [DISTWIDTH] IN BLOOD BY AUTOMATED COUNT: 13.2 %
EST. AVERAGE GLUCOSE BLD GHB EST-MCNC: 120 MG/DL (ref 68–126)
GLOBULIN PLAS-MCNC: 4 G/DL (ref 2.8–4.4)
GLUCOSE BLD-MCNC: 101 MG/DL (ref 70–99)
GLUCOSE UR STRIP.AUTO-MCNC: NORMAL MG/DL
HBA1C MFR BLD: 5.8 % (ref ?–5.7)
HCT VFR BLD AUTO: 42.4 %
HGB BLD-MCNC: 14.3 G/DL
IMM GRANULOCYTES # BLD AUTO: 0 X10(3) UL (ref 0–1)
IMM GRANULOCYTES NFR BLD: 0 %
INR BLD: 0.98 (ref 0.8–1.2)
KETONES UR STRIP.AUTO-MCNC: NEGATIVE MG/DL
LEUKOCYTE ESTERASE UR QL STRIP.AUTO: NEGATIVE
LYMPHOCYTES # BLD AUTO: 1.07 X10(3) UL (ref 1–4)
LYMPHOCYTES NFR BLD AUTO: 24.1 %
MCH RBC QN AUTO: 30.5 PG (ref 26–34)
MCHC RBC AUTO-ENTMCNC: 33.7 G/DL (ref 31–37)
MCV RBC AUTO: 90.4 FL
MONOCYTES # BLD AUTO: 0.58 X10(3) UL (ref 0.1–1)
MONOCYTES NFR BLD AUTO: 13.1 %
NEUTROPHILS # BLD AUTO: 2.66 X10 (3) UL (ref 1.5–7.7)
NEUTROPHILS # BLD AUTO: 2.66 X10(3) UL (ref 1.5–7.7)
NEUTROPHILS NFR BLD AUTO: 59.8 %
NITRITE UR QL STRIP.AUTO: NEGATIVE
OSMOLALITY SERPL CALC.SUM OF ELEC: 282 MOSM/KG (ref 275–295)
P AXIS: 64 DEGREES
P-R INTERVAL: 174 MS
PH UR STRIP.AUTO: 6.5 [PH] (ref 5–8)
PLATELET # BLD AUTO: 278 10(3)UL (ref 150–450)
POTASSIUM SERPL-SCNC: 4.5 MMOL/L (ref 3.5–5.1)
PROT SERPL-MCNC: 8.1 G/DL (ref 6.4–8.2)
PROT UR STRIP.AUTO-MCNC: NEGATIVE MG/DL
PROTHROMBIN TIME: 13 SECONDS (ref 11.6–14.8)
Q-T INTERVAL: 386 MS
QRS DURATION: 102 MS
QTC CALCULATION (BEZET): 375 MS
R AXIS: 25 DEGREES
RBC # BLD AUTO: 4.69 X10(6)UL
RBC UR QL AUTO: NEGATIVE
RH BLOOD TYPE: POSITIVE
SODIUM SERPL-SCNC: 136 MMOL/L (ref 136–145)
SP GR UR STRIP.AUTO: 1.01 (ref 1–1.03)
T AXIS: 41 DEGREES
UROBILINOGEN UR STRIP.AUTO-MCNC: NORMAL MG/DL
VENTRICULAR RATE: 57 BPM
WBC # BLD AUTO: 4.4 X10(3) UL (ref 4–11)

## 2024-04-15 PROCEDURE — 85610 PROTHROMBIN TIME: CPT | Performed by: THORACIC SURGERY (CARDIOTHORACIC VASCULAR SURGERY)

## 2024-04-15 PROCEDURE — 86850 RBC ANTIBODY SCREEN: CPT | Performed by: THORACIC SURGERY (CARDIOTHORACIC VASCULAR SURGERY)

## 2024-04-15 PROCEDURE — 36415 COLL VENOUS BLD VENIPUNCTURE: CPT | Performed by: THORACIC SURGERY (CARDIOTHORACIC VASCULAR SURGERY)

## 2024-04-15 PROCEDURE — 81003 URINALYSIS AUTO W/O SCOPE: CPT | Performed by: THORACIC SURGERY (CARDIOTHORACIC VASCULAR SURGERY)

## 2024-04-15 PROCEDURE — 86901 BLOOD TYPING SEROLOGIC RH(D): CPT | Performed by: THORACIC SURGERY (CARDIOTHORACIC VASCULAR SURGERY)

## 2024-04-15 PROCEDURE — 99204 OFFICE O/P NEW MOD 45 MIN: CPT | Performed by: NURSE PRACTITIONER

## 2024-04-15 PROCEDURE — 83036 HEMOGLOBIN GLYCOSYLATED A1C: CPT | Performed by: THORACIC SURGERY (CARDIOTHORACIC VASCULAR SURGERY)

## 2024-04-15 PROCEDURE — 93010 ELECTROCARDIOGRAM REPORT: CPT | Performed by: INTERNAL MEDICINE

## 2024-04-15 PROCEDURE — 93005 ELECTROCARDIOGRAM TRACING: CPT

## 2024-04-15 PROCEDURE — 71045 X-RAY EXAM CHEST 1 VIEW: CPT | Performed by: THORACIC SURGERY (CARDIOTHORACIC VASCULAR SURGERY)

## 2024-04-15 PROCEDURE — 86900 BLOOD TYPING SEROLOGIC ABO: CPT | Performed by: THORACIC SURGERY (CARDIOTHORACIC VASCULAR SURGERY)

## 2024-04-15 PROCEDURE — 85025 COMPLETE CBC W/AUTO DIFF WBC: CPT | Performed by: THORACIC SURGERY (CARDIOTHORACIC VASCULAR SURGERY)

## 2024-04-15 PROCEDURE — 86920 COMPATIBILITY TEST SPIN: CPT

## 2024-04-15 PROCEDURE — 85730 THROMBOPLASTIN TIME PARTIAL: CPT | Performed by: THORACIC SURGERY (CARDIOTHORACIC VASCULAR SURGERY)

## 2024-04-15 PROCEDURE — 80053 COMPREHEN METABOLIC PANEL: CPT | Performed by: THORACIC SURGERY (CARDIOTHORACIC VASCULAR SURGERY)

## 2024-04-15 RX ORDER — ALPRAZOLAM 0.25 MG/1
0.25 TABLET ORAL NIGHTLY PRN
Qty: 15 TABLET | Refills: 0 | Status: SHIPPED | OUTPATIENT
Start: 2024-04-15

## 2024-04-15 NOTE — PAT NURSING NOTE
ERIKA nursing note: met with patient and spouse Alfredo in [facility] to discuss pre-surgical instructions for her surgery 4/18 with Dr. Oquendo; patient and spouse quite anxious; testing underway; reviewed binder, medication stop dates and shower instructions/schedule; strict npo after 2200; continue to take all prescribed medications as directed except: do not take any medication the morning of surgery; last dose of vitamins, supplements, herbal products 4/10, ASA 4/12; denies taking ACEs, ARBs, blood thinners, diabetic or weight loss medications; no PPM, ICD or nerve/spinal cord stimulator; park north garage; register in [facility] at 0530; admit; 2 visitors welcome at a time in Holding; keep personal belongings to a minimum: bring binder, wireless bra, comfortable clothing, gym shoes, toiletries, pajama bottoms/sweat pants; keep all valuable at home; intra-op and post-op expectations discussed; all questions answered; patient and spouse verbalized understanding of all instructions.      5 meter walk: 3.21. 3.26, 3.31

## 2024-04-15 NOTE — PROGRESS NOTES
HPI:    Patient ID: Hilda Bello is a 59 year old female.    Chief Complaint   Patient presents with    New Patient     Having triple bypass on        New to our office. She has bypass scheduled later this week with Dr. Oquendo. She has completed pre-op testing and everything is looking great! She is having insomnia d/t nerves. She has tolerated xanax in the past. Strong family history of CAD.         Review of Systems   Constitutional: Negative.    HENT: Negative.     Respiratory: Negative.     Cardiovascular: Negative.    Gastrointestinal: Negative.    Endocrine: Negative.    Genitourinary: Negative.    Musculoskeletal: Negative.    Neurological: Negative.    Psychiatric/Behavioral:  Positive for sleep disturbance. The patient is nervous/anxious.          Past Medical History:    Anxiety    Anxiety state    Arthritis    Bloating    Blood disorder    Coronary atherosclerosis    Depression    Disorder of thyroid    Fatigue    Headache disorder    High cholesterol    Hyperlipidemia    Migraines    Night sweats    Pain in joints    Stress    Visual impairment    CONTACTS    Wears glasses    Weight gain     Past Surgical History:   Procedure Laterality Date    Lily biopsy stereo nodule 1 site left (cpt=19081) Left 2020    Lily localization wire 1 site left (cpt=19281) Left 2021    ADH      2002    Other      Dilation and curettage    [place] teeth removed      X4     Family History   Problem Relation Age of Onset    Hypertension Father     Cancer Mother         multiple myeloma    Ovarian Cancer Sister 58     Social History     Socioeconomic History    Marital status:    Tobacco Use    Smoking status: Former     Current packs/day: 0.00     Average packs/day: 0.5 packs/day for 3.0 years (1.5 ttl pk-yrs)     Types: Cigarettes     Start date: 1985     Quit date: 1988     Years since quittin.8    Smokeless tobacco: Never   Vaping Use    Vaping status: Never Used   Substance and  Sexual Activity    Alcohol use: Yes     Alcohol/week: 0.0 - 3.0 standard drinks of alcohol     Comment: occ    Drug use: Yes     Comment: thc gummy   Other Topics Concern    Caffeine Concern No    Stress Concern Yes    Weight Concern No    Special Diet No    Exercise No    Seat Belt Yes          Current Outpatient Medications   Medication Sig Dispense Refill    ALPRAZolam (XANAX) 0.25 MG Oral Tab Take 1 tablet (0.25 mg total) by mouth nightly as needed. 15 tablet 0    sertraline 100 MG Oral Tab Take 1 tablet (100 mg total) by mouth daily. 90 tablet 1    ezetimibe 10 MG Oral Tab Take 1 tablet (10 mg total) by mouth nightly. 90 tablet 3    Evolocumab (REPATHA SURECLICK) 140 MG/ML Subcutaneous Solution Auto-injector Inject 1 mL into the skin every 14 (fourteen) days. 2 mL 5    ergocalciferol 1.25 MG (32120 UT) Oral Cap Take 1 capsule (50,000 Units total) by mouth once a week. Once weekly x 20 weeks.  Take with food 20 capsule 0    butalbital-acetaminophen-caffeine -40 MG Oral Tab TAKE 1 TABLET BY MOUTH EVERY 4 HOURS AS NEEDED FOR PAIN (Patient not taking: Reported on 4/15/2024) 30 tablet 0    aspirin 81 MG Oral Tab EC Take 1 tablet (81 mg total) by mouth daily. (Patient not taking: Reported on 4/15/2024) 90 tablet 3     Allergies:No Known Allergies    Lab Results   Component Value Date     (H) 04/15/2024    BUN 12 04/15/2024    BUNCREA 18.7 06/09/2021    CREATSERUM 0.82 04/15/2024    ANIONGAP 2 04/15/2024     02/01/2018    GFRNAA 85 04/15/2022    GFRAA 97 04/15/2022    CA 9.7 04/15/2024    OSMOCALC 282 04/15/2024    ALKPHO 78 04/15/2024    AST 10 (L) 04/15/2024    ALT 22 04/15/2024    BILT 0.6 04/15/2024    TP 8.1 04/15/2024    ALB 4.1 04/15/2024    GLOBULIN 4.0 04/15/2024     04/15/2024    K 4.5 04/15/2024     04/15/2024    CO2 29.0 04/15/2024     Lab Results   Component Value Date    WBC 4.4 04/15/2024    RBC 4.69 04/15/2024    HGB 14.3 04/15/2024    HCT 42.4 04/15/2024    MCV 90.4  04/15/2024    MCH 30.5 04/15/2024    MCHC 33.7 04/15/2024    RDW 13.2 04/15/2024    .0 04/15/2024     Lab Results   Component Value Date    CHOLEST 211 (H) 04/08/2024    TRIG 66 04/08/2024    HDL 72 (H) 04/08/2024     (H) 04/08/2024    VLDL 12 04/08/2024    TCHDLRATIO 3.00 02/01/2018    NONHDLC 139 (H) 04/08/2024     Lab Results   Component Value Date     04/15/2024    A1C 5.8 (H) 04/15/2024     Lab Results   Component Value Date    T4F 0.8 04/08/2024    TSH 5.500 (H) 04/08/2024     Lab Results   Component Value Date    VITD 95.0 04/08/2024     Lab Results   Component Value Date    ANUJA 51.8 12/26/2023       Lab Results   Component Value Date    IRON 110 04/03/2024     Lab Results   Component Value Date    B12 529 12/26/2023       PHYSICAL EXAM:   /76 (BP Location: Left arm, Patient Position: Sitting)   Pulse 80   Temp 97.1 °F (36.2 °C) (Temporal)   Resp 18   Ht 5' 7\" (1.702 m)   Wt 176 lb 4 oz (79.9 kg)   SpO2 97%   BMI 27.60 kg/m²   Physical Exam  Vitals and nursing note reviewed.   Constitutional:       Appearance: Normal appearance.   Cardiovascular:      Rate and Rhythm: Normal rate and regular rhythm.      Pulses: Normal pulses.      Heart sounds: Normal heart sounds. No murmur heard.  Pulmonary:      Effort: Pulmonary effort is normal. No respiratory distress.      Breath sounds: Normal breath sounds.   Abdominal:      Palpations: Abdomen is soft.   Musculoskeletal:      Right lower leg: No edema.      Left lower leg: No edema.   Skin:     General: Skin is warm and dry.   Neurological:      Mental Status: She is alert and oriented to person, place, and time.   Psychiatric:         Mood and Affect: Mood normal.         Behavior: Behavior normal.              ASSESSMENT/PLAN:   Diagnoses and all orders for this visit:    Coronary artery disease involving native coronary artery of native heart without angina pectoris- follow up 1 week after operation     Anxiety- use sparingly  -      ALPRAZolam (XANAX) 0.25 MG Oral Tab; Take 1 tablet (0.25 mg total) by mouth nightly as needed.        Scarlett Shukla, APRN

## 2024-04-15 NOTE — CM/SW NOTE
Met with patient, accompanied by spouse Alfredo to discuss discharge planning as patient is scheduled to have CABG surgery with Dr Oquendo on April 18, 2024.  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  Hilda is independent with ADL's--drives--uses no respiratory or DME equipment.  As for interests and hobbies--patient prides herself, stating 'my kids are my life.'  Hilda prides herself with being social-enjoying time with family and friends as well as walking Bear.      Reviewed what to expect day of post surgery--discussed role of therapies to customize a plan for discharge--talked about the role of C @ discharge--both patient/spouse interested in this service being arranged @ discharge;  Hilda will not be alone--spouse, son and daughter as well as other family to be with patient.  Emotional support given.  CM/SW to be available to assist  with additional discharge needs   04/15/24 1000   CM/SW Referral Data   Referral Source Physician;   Reason for Referral Discharge planning;Protocol order set   Specify order set CABG   Informant Patient;Spouse/Significant Other   Medical Hx   Does patient have an established PCP? Yes   Patient Info   Patient's Current Mental Status at Time of Assessment Alert;Oriented   Patient's Home Environment House   Number of Levels in Home 2   Number of Stair in Home 12-14 steps without landing   Patient lives with Spouse/Significant other;Son;Other   Patient Status Prior to Admission   Independent with ADLs and Mobility Yes   Discharge Needs   Anticipated D/C needs Home health care

## 2024-04-15 NOTE — PROGRESS NOTES
Met with patient and  in PAT to discuss pre op teaching, post op expectations, discharge planning.  Discussed roles of CM/SW, PT/OT, Cardiac rehab in education and recovery.  All questions answered, binder given.  Discussed typical post op and at home recovery, pt planning 6 weeks off of work, 8 weeks off travel, she works remotely and is starting a new job.  Pt is concerned about insurance auth, auth obtained, forms given to patient. Pts  will be home to assist as well as their young adult children.  Will follow up post op.  Caitlyn Huitron RN  Clinical Coordinator  [facility]

## 2024-04-17 ENCOUNTER — ANESTHESIA EVENT (OUTPATIENT)
Dept: CARDIAC SURGERY | Facility: HOSPITAL | Age: 59
End: 2024-04-17
Payer: COMMERCIAL

## 2024-04-17 NOTE — ANESTHESIA PREPROCEDURE EVALUATION
PRE-OP EVALUATION    Patient Name: Hilda Bello    Admit Diagnosis: coronary artery disease    Pre-op Diagnosis: coronary artery disease    CORONARY ARTERY BYPASS GRAFT    Anesthesia Procedure: CORONARY ARTERY BYPASS GRAFT    Surgeons and Role:     * Gal Oquendo MD - Primary    Pre-op vitals reviewed.  Temp: 97.4 °F (36.3 °C)  Pulse: 70  Resp: 20  BP: 138/89  SpO2: 96 %  Body mass index is 27.05 kg/m².    Current medications reviewed.  Hospital Medications:   [COMPLETED] mupirocin (Bactroban) 2% nasal ointment 1 Application  1 Application Nasal Once    [START ON 4/19/2024] ceFAZolin (Ancef) 2 g in 20mL IV syringe premix  2 g Intravenous On Call    EPINEPHrine (Adrenalin) 5 mg in sodium chloride 0.9% 250 mL infusion  1-10 mcg/min Intravenous PRN    insulin regular human (Novolin R, Humulin R) 100 Units in sodium chloride 0.9% 100 mL standard infusion (100 mL)  1-40 Units/hr Intravenous PRN       Outpatient Medications:     Medications Prior to Admission   Medication Sig Dispense Refill Last Dose    ALPRAZolam (XANAX) 0.25 MG Oral Tab Take 1 tablet (0.25 mg total) by mouth nightly as needed. 15 tablet 0 4/17/2024    sertraline 100 MG Oral Tab Take 1 tablet (100 mg total) by mouth daily. 90 tablet 1 4/17/2024    ezetimibe 10 MG Oral Tab Take 1 tablet (10 mg total) by mouth nightly. 90 tablet 3 4/16/2024    Evolocumab (REPATHA SURECLICK) 140 MG/ML Subcutaneous Solution Auto-injector Inject 1 mL into the skin every 14 (fourteen) days. 2 mL 5 4/13/2024    ergocalciferol 1.25 MG (70727 UT) Oral Cap Take 1 capsule (50,000 Units total) by mouth once a week. Once weekly x 20 weeks.  Take with food 20 capsule 0 4/12/2024       Allergies: Statins      Anesthesia Evaluation    Patient summary reviewed.    Anesthetic Complications  (-) history of anesthetic complications         GI/Hepatic/Renal                                 Cardiovascular      ECG reviewed.               (+) hyperlipidemia  (+) CAD                                 Endo/Other                                  Pulmonary    Negative pulmonary ROS.                       Neuro/Psych      (+) depression  (+) anxiety                              Past Surgical History:   Procedure Laterality Date    Lily biopsy stereo nodule 1 site left (cpt=19081) Left 2020    Lily localization wire 1 site left (cpt=19281) Left 2021    ADH      2002    Other      Dilation and curettage    [place] teeth removed      X4     Social History     Socioeconomic History    Marital status:    Tobacco Use    Smoking status: Former     Current packs/day: 0.00     Average packs/day: 0.5 packs/day for 3.0 years (1.5 ttl pk-yrs)     Types: Cigarettes     Start date: 1985     Quit date: 1988     Years since quittin.9    Smokeless tobacco: Never   Vaping Use    Vaping status: Never Used   Substance and Sexual Activity    Alcohol use: Yes     Alcohol/week: 0.0 - 3.0 standard drinks of alcohol     Comment: occ    Drug use: Yes     Comment: thc gummy   Other Topics Concern    Caffeine Concern No    Stress Concern Yes    Weight Concern No    Special Diet No    Exercise No    Seat Belt Yes     History   Drug Use     Comment: thc gummy     Available pre-op labs reviewed.  Lab Results   Component Value Date    WBC 4.4 04/15/2024    RBC 4.69 04/15/2024    HGB 14.3 04/15/2024    HCT 42.4 04/15/2024    MCV 90.4 04/15/2024    MCH 30.5 04/15/2024    MCHC 33.7 04/15/2024    RDW 13.2 04/15/2024    .0 04/15/2024     Lab Results   Component Value Date     04/15/2024    K 4.5 04/15/2024     04/15/2024    CO2 29.0 04/15/2024    BUN 12 04/15/2024    CREATSERUM 0.82 04/15/2024     (H) 04/15/2024    CA 9.7 04/15/2024     Lab Results   Component Value Date    INR 0.98 04/15/2024         Airway      Mallampati: II  Mouth opening: 3 FB  TM distance: 4 - 6 cm  Neck ROM: full Cardiovascular      Rhythm: regular  Rate: normal     Dental  Comment: No loose teeth reported  by patient           Pulmonary      Breath sounds clear to auscultation bilaterally.               Other findings              ASA: 3   Plan: general  NPO status verified and patient meets guidelines.  Patient has taken beta blockers in last 24 hours.  Post-procedure pain management plan discussed with surgeon and patient.    Comment: We discussed GA w/ETT and postoperative ventilation and CCU admission.  We discussed placement of additional lines including arterial catheter, additional PIVs, central venous catheter/PAC and intraop TAMIKA.  Patient understands extubation will occur once the overall hemodynamic status is stable. Discussed rare risk of dental trauma from intubation, scratchy throat, and postop nausea and vomiting.  Discussed possible use of blood products. We discussed postoperative usage of sedatives, analgesics and anxiolytics.  All questions re: anesthetic management were answered.    Plan/risks discussed with: patient, spouse and child/children  Use of blood product(s) discussed with: patient, spouse and child/children              Present on Admission:  **None**

## 2024-04-18 ENCOUNTER — APPOINTMENT (OUTPATIENT)
Dept: GENERAL RADIOLOGY | Facility: HOSPITAL | Age: 59
DRG: 236 | End: 2024-04-18
Attending: PHYSICIAN ASSISTANT

## 2024-04-18 ENCOUNTER — APPOINTMENT (OUTPATIENT)
Dept: GENERAL RADIOLOGY | Facility: HOSPITAL | Age: 59
End: 2024-04-18
Attending: PHYSICIAN ASSISTANT

## 2024-04-18 ENCOUNTER — APPOINTMENT (OUTPATIENT)
Dept: GENERAL RADIOLOGY | Facility: HOSPITAL | Age: 59
End: 2024-04-18
Attending: THORACIC SURGERY (CARDIOTHORACIC VASCULAR SURGERY)

## 2024-04-18 ENCOUNTER — APPOINTMENT (OUTPATIENT)
Dept: GENERAL RADIOLOGY | Facility: HOSPITAL | Age: 59
DRG: 236 | End: 2024-04-18
Attending: THORACIC SURGERY (CARDIOTHORACIC VASCULAR SURGERY)

## 2024-04-18 ENCOUNTER — HOSPITAL ENCOUNTER (INPATIENT)
Facility: HOSPITAL | Age: 59
LOS: 4 days | Discharge: HOME | DRG: 236 | End: 2024-04-22
Attending: THORACIC SURGERY (CARDIOTHORACIC VASCULAR SURGERY) | Admitting: THORACIC SURGERY (CARDIOTHORACIC VASCULAR SURGERY)

## 2024-04-18 ENCOUNTER — HOSPITAL ENCOUNTER (INPATIENT)
Facility: HOSPITAL | Age: 59
LOS: 4 days | Discharge: HOME | End: 2024-04-22
Attending: THORACIC SURGERY (CARDIOTHORACIC VASCULAR SURGERY) | Admitting: THORACIC SURGERY (CARDIOTHORACIC VASCULAR SURGERY)

## 2024-04-18 ENCOUNTER — ANESTHESIA (OUTPATIENT)
Dept: CARDIAC SURGERY | Facility: HOSPITAL | Age: 59
End: 2024-04-18
Payer: COMMERCIAL

## 2024-04-18 DIAGNOSIS — I25.10 CAD (CORONARY ARTERY DISEASE), NATIVE CORONARY ARTERY: Primary | ICD-10-CM

## 2024-04-18 DIAGNOSIS — Z01.818 PRE-OP TESTING: ICD-10-CM

## 2024-04-18 DIAGNOSIS — J90 PLEURAL EFFUSION: ICD-10-CM

## 2024-04-18 DIAGNOSIS — Z91.89 AT RISK FOR BLEEDING: ICD-10-CM

## 2024-04-18 DIAGNOSIS — Z95.1 S/P CABG (CORONARY ARTERY BYPASS GRAFT): ICD-10-CM

## 2024-04-18 LAB
APTT PPP: 25.7 SECONDS (ref 23.3–35.6)
APTT PPP: 27.5 SECONDS (ref 23.3–35.6)
ATRIAL RATE: 103 BPM
BASE EXCESS BLD CALC-SCNC: -4 MMOL/L
BASE EXCESS BLD CALC-SCNC: -5 MMOL/L
BASE EXCESS BLDA CALC-SCNC: 0 MMOL/L (ref ?–30)
BASE EXCESS BLDA CALC-SCNC: 0.6 MMOL/L (ref ?–2)
BASE EXCESS BLDA CALC-SCNC: 2.1 MMOL/L (ref ?–2)
BASE EXCESS BLDMV CALC-SCNC: -2 MMOL/L (ref ?–30)
BODY TEMPERATURE: 98.6 F
BODY TEMPERATURE: 98.6 F
BUN BLD-MCNC: 11 MG/DL (ref 9–23)
CA-I BLD-SCNC: 1.17 MMOL/L (ref 1.12–1.32)
CA-I BLD-SCNC: 1.21 MMOL/L (ref 0.95–1.32)
CA-I BLD-SCNC: 1.23 MMOL/L (ref 0.95–1.32)
CA-I BLD-SCNC: 1.31 MMOL/L (ref 1.12–1.32)
CA-I BLDA-SCNC: 1.38 MMOL/L (ref 1.12–1.32)
CA-I BLDMV-SCNC: 1.1 MMOL/L (ref 1.12–1.32)
CA-I BLDMV-SCNC: 1.15 MMOL/L (ref 1.12–1.32)
CA-I BLDMV-SCNC: 1.16 MMOL/L (ref 1.12–1.32)
CALCIUM BLD-MCNC: 8.6 MG/DL (ref 8.5–10.1)
CHLORIDE SERPL-SCNC: 112 MMOL/L (ref 98–112)
CO2 BLD-SCNC: 22 MMOL/L (ref 22–32)
CO2 BLD-SCNC: 23 MMOL/L (ref 22–32)
CO2 BLDA-SCNC: 27 MMOL/L (ref 22–32)
CO2 BLDMV-SCNC: 25 MMOL/L (ref 22–32)
CO2 SERPL-SCNC: 26 MMOL/L (ref 21–32)
COHGB MFR BLD: 1.1 % SAT (ref 0–3)
COHGB MFR BLD: 1.3 % SAT (ref 0–3)
CREAT BLD-MCNC: 0.9 MG/DL
EGFRCR SERPLBLD CKD-EPI 2021: 75 ML/MIN/1.73M2 (ref 60–?)
ERYTHROCYTE [DISTWIDTH] IN BLOOD BY AUTOMATED COUNT: 13.1 %
FIBRINOGEN PPP-MCNC: 231 MG/DL (ref 180–480)
FIBRINOGEN PPP-MCNC: 252 MG/DL (ref 180–480)
FIO2: 40 %
FIO2: 40 %
GLUCOSE BLD-MCNC: 107 MG/DL (ref 70–99)
GLUCOSE BLD-MCNC: 110 MG/DL (ref 70–99)
GLUCOSE BLD-MCNC: 114 MG/DL (ref 70–99)
GLUCOSE BLD-MCNC: 120 MG/DL (ref 70–99)
GLUCOSE BLD-MCNC: 131 MG/DL (ref 70–99)
GLUCOSE BLD-MCNC: 137 MG/DL (ref 70–99)
GLUCOSE BLD-MCNC: 143 MG/DL (ref 70–99)
GLUCOSE BLD-MCNC: 145 MG/DL (ref 70–99)
GLUCOSE BLD-MCNC: 148 MG/DL (ref 70–99)
GLUCOSE BLD-MCNC: 161 MG/DL (ref 70–99)
GLUCOSE BLD-MCNC: 163 MG/DL (ref 70–99)
GLUCOSE BLD-MCNC: 177 MG/DL (ref 70–99)
GLUCOSE BLD-MCNC: 179 MG/DL (ref 70–99)
GLUCOSE BLD-MCNC: 187 MG/DL (ref 70–99)
GLUCOSE BLD-MCNC: 199 MG/DL (ref 70–99)
GLUCOSE BLD-MCNC: 204 MG/DL (ref 70–99)
GLUCOSE BLD-MCNC: 96 MG/DL (ref 70–99)
GLUCOSE BLD-MCNC: 97 MG/DL (ref 70–99)
GLUCOSE BLD-MCNC: 99 MG/DL (ref 70–99)
GLUCOSE BLDA-MCNC: 187 MG/DL (ref 70–99)
HCO3 BLD-SCNC: 20.7 MEQ/L
HCO3 BLD-SCNC: 22 MEQ/L
HCO3 BLDA-SCNC: 25.4 MEQ/L (ref 21–27)
HCO3 BLDA-SCNC: 25.5 MEQ/L (ref 22–26)
HCO3 BLDA-SCNC: 26.6 MEQ/L (ref 21–27)
HCO3 BLDMV-SCNC: 23.7 MEQ/L (ref 22–26)
HCO3 BLDMV-SCNC: 23.7 MEQ/L (ref 22–26)
HCO3 BLDMV-SCNC: 23.9 MEQ/L (ref 22–26)
HCT VFR BLD AUTO: 32.1 %
HCT VFR BLD CALC: 32 %
HCT VFR BLD CALC: 33 %
HCT VFR BLDA CALC: 27 %
HCT VFR BLDMV CALC: 23 %
HCT VFR BLDMV CALC: 25 %
HCT VFR BLDMV CALC: 25 %
HGB BLD-MCNC: 10.4 G/DL
HGB BLD-MCNC: 10.9 G/DL
HGB BLD-MCNC: 11.2 G/DL
INR BLD: 1.39 (ref 0.8–1.2)
INR BLD: 1.58 (ref 0.8–1.2)
ISTAT ACTIVATED CLOTTING TIME: 131 SECONDS (ref 74–137)
ISTAT ACTIVATED CLOTTING TIME: 147 SECONDS (ref 74–137)
ISTAT ACTIVATED CLOTTING TIME: 401 SECONDS (ref 74–137)
ISTAT ACTIVATED CLOTTING TIME: 428 SECONDS (ref 74–137)
ISTAT ACTIVATED CLOTTING TIME: 439 SECONDS (ref 74–137)
ISTAT ACTIVATED CLOTTING TIME: 552 SECONDS (ref 74–137)
ISTAT PATIENT TEMPERATURE: 32 DEGREE
ISTAT PATIENT TEMPERATURE: 32 DEGREE
LACTATE BLD-SCNC: 4.6 MMOL/L (ref 0.5–2)
LACTATE BLD-SCNC: 4.9 MMOL/L (ref 0.5–2)
MAGNESIUM SERPL-MCNC: 2.2 MG/DL (ref 1.6–2.6)
MCH RBC QN AUTO: 31.2 PG (ref 26–34)
MCHC RBC AUTO-ENTMCNC: 34.9 G/DL (ref 31–37)
MCV RBC AUTO: 89.4 FL
METHGB MFR BLD: 0.6 % SAT (ref 0.4–1.5)
METHGB MFR BLD: 0.6 % SAT (ref 0.4–1.5)
MRSA DNA SPEC QL NAA+PROBE: NEGATIVE
OXYHGB MFR BLDA: 97.6 % (ref 92–100)
OXYHGB MFR BLDA: 98.1 % (ref 92–100)
P AXIS: 72 DEGREES
P-R INTERVAL: 172 MS
P/F RATIO: 283 MMHG
P/F RATIO: 358 MMHG
PCO2 BLD: 35.7 MMHG
PCO2 BLD: 41.4 MMHG
PCO2 BLDA: 43.1 MMHG (ref 35–45)
PCO2 BLDA: 44 MM HG (ref 35–45)
PCO2 BLDA: 44 MM HG (ref 35–45)
PCO2 BLDMV: 35.4 MMHG (ref 38–50)
PCO2 BLDMV: 36.8 MMHG (ref 38–50)
PCO2 BLDMV: 43.5 MMHG (ref 38–50)
PEEP: 5 CM H2O
PEEP: 5 CM H2O
PH BLD: 7.33 [PH]
PH BLD: 7.37 [PH]
PH BLDA: 7.38 [PH] (ref 7.35–7.45)
PH BLDA: 7.38 [PH] (ref 7.35–7.45)
PH BLDA: 7.4 [PH] (ref 7.35–7.45)
PH BLDMV: 7.35 [PH] (ref 7.32–7.43)
PH BLDMV: 7.4 [PH] (ref 7.32–7.43)
PH BLDMV: 7.41 [PH] (ref 7.32–7.43)
PLATELET # BLD AUTO: 178 10(3)UL (ref 150–450)
PLATELET # BLD AUTO: 182 10(3)UL (ref 150–450)
PO2 BLD: 129 MMHG
PO2 BLD: 484 MMHG
PO2 BLDA: 113 MM HG (ref 80–100)
PO2 BLDA: 143 MM HG (ref 80–100)
PO2 BLDA: 248 MMHG (ref 80–105)
PO2 BLDMV: <70 MMHG (ref 35–40)
POTASSIUM BLD-SCNC: 2.8 MMOL/L (ref 3.6–5.1)
POTASSIUM BLD-SCNC: 3.4 MMOL/L (ref 3.6–5.1)
POTASSIUM BLD-SCNC: 3.7 MMOL/L (ref 3.6–5.1)
POTASSIUM BLD-SCNC: 4.9 MMOL/L (ref 3.6–5.1)
POTASSIUM SERPL-SCNC: 2.9 MMOL/L (ref 3.5–5.1)
POTASSIUM SERPL-SCNC: 3.5 MMOL/L (ref 3.5–5.1)
PRESSURE SUPPORT: 5 CM H2O
PROTHROMBIN TIME: 17.2 SECONDS (ref 11.6–14.8)
PROTHROMBIN TIME: 18.9 SECONDS (ref 11.6–14.8)
Q-T INTERVAL: 400 MS
QRS DURATION: 118 MS
QTC CALCULATION (BEZET): 524 MS
R AXIS: -25 DEGREES
RBC # BLD AUTO: 3.59 X10(6)UL
SAO2 % BLD: 100 %
SAO2 % BLD: 99 %
SAO2 % BLDA: 100 % (ref 92–100)
SAO2 % BLDMV: 68 % (ref 60–85)
SAO2 % BLDMV: 86 % (ref 60–85)
SAO2 % BLDMV: 88 % (ref 60–85)
SODIUM BLD-SCNC: 140 MMOL/L (ref 136–145)
SODIUM BLD-SCNC: 141 MMOL/L (ref 135–145)
SODIUM BLD-SCNC: 142 MMOL/L (ref 135–145)
SODIUM BLD-SCNC: 143 MMOL/L (ref 136–145)
SODIUM BLDA-SCNC: 138 MMOL/L (ref 136–145)
SODIUM BLDA-SCNC: 3.9 MMOL/L (ref 3.6–5.1)
SODIUM BLDMV-SCNC: 130 MMOL/L (ref 136–145)
SODIUM BLDMV-SCNC: 132 MMOL/L (ref 136–145)
SODIUM BLDMV-SCNC: 133 MMOL/L (ref 136–145)
SODIUM BLDMV-SCNC: 5.7 MMOL/L (ref 3.6–5.1)
SODIUM BLDMV-SCNC: 5.9 MMOL/L (ref 3.6–5.1)
SODIUM BLDMV-SCNC: 6.6 MMOL/L (ref 3.6–5.1)
SODIUM SERPL-SCNC: 142 MMOL/L (ref 136–145)
T AXIS: 37 DEGREES
TIDAL VOLUME: 500 ML
VENT RATE: 14 /MIN
VENTRICULAR RATE: 103 BPM
WBC # BLD AUTO: 21.4 X10(3) UL (ref 4–11)

## 2024-04-18 PROCEDURE — S0028 INJECTION, FAMOTIDINE, 20 MG: HCPCS | Performed by: ANESTHESIOLOGY

## 2024-04-18 PROCEDURE — 021009W BYPASS CORONARY ARTERY, ONE ARTERY FROM AORTA WITH AUTOLOGOUS VENOUS TISSUE, OPEN APPROACH: ICD-10-PCS | Performed by: THORACIC SURGERY (CARDIOTHORACIC VASCULAR SURGERY)

## 2024-04-18 PROCEDURE — 02100Z9 BYPASS CORONARY ARTERY, ONE ARTERY FROM LEFT INTERNAL MAMMARY, OPEN APPROACH: ICD-10-PCS | Performed by: THORACIC SURGERY (CARDIOTHORACIC VASCULAR SURGERY)

## 2024-04-18 PROCEDURE — 93312 ECHO TRANSESOPHAGEAL: CPT | Performed by: ANESTHESIOLOGY

## 2024-04-18 PROCEDURE — 5A1221Z PERFORMANCE OF CARDIAC OUTPUT, CONTINUOUS: ICD-10-PCS | Performed by: THORACIC SURGERY (CARDIOTHORACIC VASCULAR SURGERY)

## 2024-04-18 PROCEDURE — 71045 X-RAY EXAM CHEST 1 VIEW: CPT | Performed by: PHYSICIAN ASSISTANT

## 2024-04-18 PROCEDURE — 06BQ0ZZ EXCISION OF LEFT SAPHENOUS VEIN, OPEN APPROACH: ICD-10-PCS | Performed by: THORACIC SURGERY (CARDIOTHORACIC VASCULAR SURGERY)

## 2024-04-18 PROCEDURE — 99222 1ST HOSP IP/OBS MODERATE 55: CPT | Performed by: HOSPITALIST

## 2024-04-18 RX ORDER — ENEMA 19; 7 G/133ML; G/133ML
1 ENEMA RECTAL ONCE AS NEEDED
Status: DISCONTINUED | OUTPATIENT
Start: 2024-04-18 | End: 2024-04-22

## 2024-04-18 RX ORDER — PANTOPRAZOLE SODIUM 40 MG/1
40 TABLET, DELAYED RELEASE ORAL
Status: DISCONTINUED | OUTPATIENT
Start: 2024-04-19 | End: 2024-04-22

## 2024-04-18 RX ORDER — DOBUTAMINE HYDROCHLORIDE 200 MG/100ML
INJECTION INTRAVENOUS CONTINUOUS PRN
Status: DISCONTINUED | OUTPATIENT
Start: 2024-04-18 | End: 2024-04-22

## 2024-04-18 RX ORDER — DOBUTAMINE HYDROCHLORIDE 200 MG/100ML
INJECTION INTRAVENOUS CONTINUOUS PRN
Status: DISCONTINUED | OUTPATIENT
Start: 2024-04-18 | End: 2024-04-18 | Stop reason: SURG

## 2024-04-18 RX ORDER — DEXMEDETOMIDINE HYDROCHLORIDE 4 UG/ML
INJECTION, SOLUTION INTRAVENOUS CONTINUOUS
Status: DISCONTINUED | OUTPATIENT
Start: 2024-04-18 | End: 2024-04-20

## 2024-04-18 RX ORDER — MAGNESIUM SULFATE 1 G/100ML
1 INJECTION INTRAVENOUS AS NEEDED
Status: DISCONTINUED | OUTPATIENT
Start: 2024-04-18 | End: 2024-04-22

## 2024-04-18 RX ORDER — METOCLOPRAMIDE HYDROCHLORIDE 5 MG/ML
INJECTION INTRAMUSCULAR; INTRAVENOUS AS NEEDED
Status: DISCONTINUED | OUTPATIENT
Start: 2024-04-18 | End: 2024-04-18 | Stop reason: SURG

## 2024-04-18 RX ORDER — NALOXONE HYDROCHLORIDE 0.4 MG/ML
0.08 INJECTION, SOLUTION INTRAMUSCULAR; INTRAVENOUS; SUBCUTANEOUS
Status: DISCONTINUED | OUTPATIENT
Start: 2024-04-18 | End: 2024-04-22

## 2024-04-18 RX ORDER — ROCURONIUM BROMIDE 10 MG/ML
INJECTION, SOLUTION INTRAVENOUS AS NEEDED
Status: DISCONTINUED | OUTPATIENT
Start: 2024-04-18 | End: 2024-04-18 | Stop reason: SURG

## 2024-04-18 RX ORDER — DEXMEDETOMIDINE HYDROCHLORIDE 4 UG/ML
INJECTION, SOLUTION INTRAVENOUS CONTINUOUS PRN
Status: DISCONTINUED | OUTPATIENT
Start: 2024-04-18 | End: 2024-04-18 | Stop reason: SURG

## 2024-04-18 RX ORDER — SENNOSIDES 8.6 MG
17.2 TABLET ORAL NIGHTLY PRN
Status: DISCONTINUED | OUTPATIENT
Start: 2024-04-18 | End: 2024-04-22

## 2024-04-18 RX ORDER — MORPHINE SULFATE 2 MG/ML
2 INJECTION, SOLUTION INTRAMUSCULAR; INTRAVENOUS EVERY 2 HOUR PRN
Status: DISCONTINUED | OUTPATIENT
Start: 2024-04-18 | End: 2024-04-22

## 2024-04-18 RX ORDER — VANCOMYCIN HYDROCHLORIDE
15 EVERY 12 HOURS
Qty: 500 ML | Refills: 0 | Status: COMPLETED | OUTPATIENT
Start: 2024-04-18 | End: 2024-04-19

## 2024-04-18 RX ORDER — BISACODYL 10 MG
10 SUPPOSITORY, RECTAL RECTAL
Status: DISCONTINUED | OUTPATIENT
Start: 2024-04-18 | End: 2024-04-22

## 2024-04-18 RX ORDER — ACETAMINOPHEN 10 MG/ML
1000 INJECTION, SOLUTION INTRAVENOUS EVERY 6 HOURS
Qty: 600 ML | Refills: 0 | Status: DISPENSED | OUTPATIENT
Start: 2024-04-18 | End: 2024-04-19

## 2024-04-18 RX ORDER — PHENYLEPHRINE HCL 10 MG/ML
VIAL (ML) INJECTION AS NEEDED
Status: DISCONTINUED | OUTPATIENT
Start: 2024-04-18 | End: 2024-04-18 | Stop reason: SURG

## 2024-04-18 RX ORDER — ALBUMIN, HUMAN INJ 5% 5 %
SOLUTION INTRAVENOUS
Status: COMPLETED
Start: 2024-04-18 | End: 2024-04-18

## 2024-04-18 RX ORDER — POTASSIUM CHLORIDE 14.9 MG/ML
20 INJECTION INTRAVENOUS AS NEEDED
Status: DISCONTINUED | OUTPATIENT
Start: 2024-04-18 | End: 2024-04-22

## 2024-04-18 RX ORDER — CEFAZOLIN SODIUM/WATER 2 G/20 ML
2 SYRINGE (ML) INTRAVENOUS
Status: COMPLETED | OUTPATIENT
Start: 2024-04-19 | End: 2024-04-18

## 2024-04-18 RX ORDER — POLYETHYLENE GLYCOL 3350 17 G/17G
17 POWDER, FOR SOLUTION ORAL DAILY PRN
Status: DISCONTINUED | OUTPATIENT
Start: 2024-04-18 | End: 2024-04-22

## 2024-04-18 RX ORDER — MIDAZOLAM HYDROCHLORIDE 1 MG/ML
1 INJECTION INTRAMUSCULAR; INTRAVENOUS EVERY 30 MIN PRN
Status: DISCONTINUED | OUTPATIENT
Start: 2024-04-18 | End: 2024-04-20

## 2024-04-18 RX ORDER — METHYLPREDNISOLONE SODIUM SUCCINATE 500 MG/8ML
INJECTION INTRAMUSCULAR; INTRAVENOUS AS NEEDED
Status: DISCONTINUED | OUTPATIENT
Start: 2024-04-18 | End: 2024-04-18 | Stop reason: SURG

## 2024-04-18 RX ORDER — IPRATROPIUM BROMIDE AND ALBUTEROL SULFATE 2.5; .5 MG/3ML; MG/3ML
3 SOLUTION RESPIRATORY (INHALATION) EVERY 4 HOURS PRN
Status: DISCONTINUED | OUTPATIENT
Start: 2024-04-18 | End: 2024-04-22

## 2024-04-18 RX ORDER — SODIUM CHLORIDE 9 MG/ML
INJECTION, SOLUTION INTRAVENOUS CONTINUOUS PRN
Status: DISCONTINUED | OUTPATIENT
Start: 2024-04-18 | End: 2024-04-18 | Stop reason: SURG

## 2024-04-18 RX ORDER — ALBUMIN, HUMAN INJ 5% 5 %
SOLUTION INTRAVENOUS
Status: DISPENSED
Start: 2024-04-18 | End: 2024-04-18

## 2024-04-18 RX ORDER — CHLORHEXIDINE GLUCONATE ORAL RINSE 1.2 MG/ML
15 SOLUTION DENTAL
Status: DISCONTINUED | OUTPATIENT
Start: 2024-04-18 | End: 2024-04-19

## 2024-04-18 RX ORDER — MIDAZOLAM HYDROCHLORIDE 1 MG/ML
INJECTION INTRAMUSCULAR; INTRAVENOUS AS NEEDED
Status: DISCONTINUED | OUTPATIENT
Start: 2024-04-18 | End: 2024-04-18 | Stop reason: SURG

## 2024-04-18 RX ORDER — DEXTROSE AND SODIUM CHLORIDE 5; .45 G/100ML; G/100ML
INJECTION, SOLUTION INTRAVENOUS CONTINUOUS
Status: DISCONTINUED | OUTPATIENT
Start: 2024-04-18 | End: 2024-04-20

## 2024-04-18 RX ORDER — EZETIMIBE 10 MG/1
10 TABLET ORAL NIGHTLY
Status: DISCONTINUED | OUTPATIENT
Start: 2024-04-18 | End: 2024-04-22

## 2024-04-18 RX ORDER — MORPHINE SULFATE 4 MG/ML
4 INJECTION, SOLUTION INTRAMUSCULAR; INTRAVENOUS EVERY 2 HOUR PRN
Status: DISCONTINUED | OUTPATIENT
Start: 2024-04-18 | End: 2024-04-22

## 2024-04-18 RX ORDER — HEPARIN SODIUM 1000 [USP'U]/ML
INJECTION, SOLUTION INTRAVENOUS; SUBCUTANEOUS AS NEEDED
Status: DISCONTINUED | OUTPATIENT
Start: 2024-04-18 | End: 2024-04-18 | Stop reason: SURG

## 2024-04-18 RX ORDER — ALBUMIN, HUMAN INJ 5% 5 %
12.5 SOLUTION INTRAVENOUS ONCE AS NEEDED
Status: DISPENSED | OUTPATIENT
Start: 2024-04-18 | End: 2024-04-19

## 2024-04-18 RX ORDER — FAMOTIDINE 10 MG/ML
INJECTION, SOLUTION INTRAVENOUS AS NEEDED
Status: DISCONTINUED | OUTPATIENT
Start: 2024-04-18 | End: 2024-04-18 | Stop reason: SURG

## 2024-04-18 RX ORDER — POTASSIUM CHLORIDE 29.8 MG/ML
40 INJECTION INTRAVENOUS AS NEEDED
Status: DISCONTINUED | OUTPATIENT
Start: 2024-04-18 | End: 2024-04-22

## 2024-04-18 RX ORDER — MAGNESIUM SULFATE HEPTAHYDRATE 40 MG/ML
2 INJECTION, SOLUTION INTRAVENOUS AS NEEDED
Status: DISCONTINUED | OUTPATIENT
Start: 2024-04-18 | End: 2024-04-22

## 2024-04-18 RX ORDER — NICOTINE POLACRILEX 4 MG
30 LOZENGE BUCCAL
Status: DISCONTINUED | OUTPATIENT
Start: 2024-04-18 | End: 2024-04-22

## 2024-04-18 RX ORDER — MELATONIN
3 NIGHTLY PRN
Status: DISCONTINUED | OUTPATIENT
Start: 2024-04-18 | End: 2024-04-22

## 2024-04-18 RX ORDER — DIPHENHYDRAMINE HYDROCHLORIDE 50 MG/ML
INJECTION INTRAMUSCULAR; INTRAVENOUS AS NEEDED
Status: DISCONTINUED | OUTPATIENT
Start: 2024-04-18 | End: 2024-04-18 | Stop reason: SURG

## 2024-04-18 RX ORDER — ONDANSETRON 2 MG/ML
4 INJECTION INTRAMUSCULAR; INTRAVENOUS EVERY 6 HOURS PRN
Status: DISCONTINUED | OUTPATIENT
Start: 2024-04-18 | End: 2024-04-22

## 2024-04-18 RX ORDER — SERTRALINE HYDROCHLORIDE 100 MG/1
100 TABLET, FILM COATED ORAL DAILY
Status: DISCONTINUED | OUTPATIENT
Start: 2024-04-19 | End: 2024-04-22

## 2024-04-18 RX ORDER — DIPHENHYDRAMINE HYDROCHLORIDE 50 MG/ML
12.5 INJECTION INTRAMUSCULAR; INTRAVENOUS EVERY 4 HOURS PRN
Status: DISCONTINUED | OUTPATIENT
Start: 2024-04-18 | End: 2024-04-22

## 2024-04-18 RX ORDER — ALBUMIN, HUMAN INJ 5% 5 %
25 SOLUTION INTRAVENOUS ONCE
Status: COMPLETED | OUTPATIENT
Start: 2024-04-18 | End: 2024-04-18

## 2024-04-18 RX ORDER — NITROGLYCERIN 20 MG/100ML
INJECTION INTRAVENOUS CONTINUOUS PRN
Status: DISCONTINUED | OUTPATIENT
Start: 2024-04-18 | End: 2024-04-22

## 2024-04-18 RX ORDER — SODIUM CHLORIDE 9 MG/ML
INJECTION, SOLUTION INTRAVENOUS CONTINUOUS
Status: DISCONTINUED | OUTPATIENT
Start: 2024-04-18 | End: 2024-04-22

## 2024-04-18 RX ORDER — ASPIRIN 81 MG/1
81 TABLET ORAL DAILY
Status: DISCONTINUED | OUTPATIENT
Start: 2024-04-19 | End: 2024-04-22

## 2024-04-18 RX ORDER — CEFAZOLIN SODIUM/WATER 2 G/20 ML
2 SYRINGE (ML) INTRAVENOUS EVERY 8 HOURS
Qty: 100 ML | Refills: 0 | Status: COMPLETED | OUTPATIENT
Start: 2024-04-18 | End: 2024-04-20

## 2024-04-18 RX ORDER — PROTAMINE SULFATE 10 MG/ML
INJECTION, SOLUTION INTRAVENOUS AS NEEDED
Status: DISCONTINUED | OUTPATIENT
Start: 2024-04-18 | End: 2024-04-18 | Stop reason: SURG

## 2024-04-18 RX ORDER — NICOTINE POLACRILEX 4 MG
15 LOZENGE BUCCAL
Status: DISCONTINUED | OUTPATIENT
Start: 2024-04-18 | End: 2024-04-22

## 2024-04-18 RX ORDER — DEXTROSE MONOHYDRATE 25 G/50ML
50 INJECTION, SOLUTION INTRAVENOUS
Status: DISCONTINUED | OUTPATIENT
Start: 2024-04-18 | End: 2024-04-22

## 2024-04-18 RX ADMIN — MIDAZOLAM HYDROCHLORIDE 4 MG: 1 INJECTION INTRAMUSCULAR; INTRAVENOUS at 08:33:00

## 2024-04-18 RX ADMIN — PHENYLEPHRINE HCL 100 MCG: 10 MG/ML VIAL (ML) INJECTION at 08:33:00

## 2024-04-18 RX ADMIN — PROTAMINE SULFATE 50 MG: 10 INJECTION, SOLUTION INTRAVENOUS at 10:02:00

## 2024-04-18 RX ADMIN — DOBUTAMINE HYDROCHLORIDE 5 MCG/KG/MIN: 200 INJECTION INTRAVENOUS at 09:48:00

## 2024-04-18 RX ADMIN — HEPARIN SODIUM 24000 UNITS: 1000 INJECTION, SOLUTION INTRAVENOUS; SUBCUTANEOUS at 08:29:00

## 2024-04-18 RX ADMIN — METOCLOPRAMIDE HYDROCHLORIDE 10 MG: 5 INJECTION INTRAMUSCULAR; INTRAVENOUS at 09:57:00

## 2024-04-18 RX ADMIN — PHENYLEPHRINE HCL 100 MCG: 10 MG/ML VIAL (ML) INJECTION at 08:23:00

## 2024-04-18 RX ADMIN — DIPHENHYDRAMINE HYDROCHLORIDE 50 MG: 50 INJECTION INTRAMUSCULAR; INTRAVENOUS at 06:38:00

## 2024-04-18 RX ADMIN — CEFAZOLIN SODIUM/WATER 2 G: 2 G/20 ML SYRINGE (ML) INTRAVENOUS at 10:45:00

## 2024-04-18 RX ADMIN — ROCURONIUM BROMIDE 30 MG: 10 INJECTION, SOLUTION INTRAVENOUS at 10:45:00

## 2024-04-18 RX ADMIN — FAMOTIDINE 20 MG: 10 INJECTION, SOLUTION INTRAVENOUS at 06:38:00

## 2024-04-18 RX ADMIN — MIDAZOLAM HYDROCHLORIDE 2 MG: 1 INJECTION INTRAMUSCULAR; INTRAVENOUS at 06:43:00

## 2024-04-18 RX ADMIN — PROTAMINE SULFATE 50 MG: 10 INJECTION, SOLUTION INTRAVENOUS at 09:58:00

## 2024-04-18 RX ADMIN — PROTAMINE SULFATE 50 MG: 10 INJECTION, SOLUTION INTRAVENOUS at 09:56:00

## 2024-04-18 RX ADMIN — SODIUM CHLORIDE: 9 INJECTION, SOLUTION INTRAVENOUS at 06:38:00

## 2024-04-18 RX ADMIN — PROTAMINE SULFATE 50 MG: 10 INJECTION, SOLUTION INTRAVENOUS at 09:55:00

## 2024-04-18 RX ADMIN — PROTAMINE SULFATE 50 MG: 10 INJECTION, SOLUTION INTRAVENOUS at 10:07:00

## 2024-04-18 RX ADMIN — CEFAZOLIN SODIUM/WATER 2 G: 2 G/20 ML SYRINGE (ML) INTRAVENOUS at 07:00:00

## 2024-04-18 RX ADMIN — SODIUM CHLORIDE: 9 INJECTION, SOLUTION INTRAVENOUS at 11:02:00

## 2024-04-18 RX ADMIN — METHYLPREDNISOLONE SODIUM SUCCINATE 500 MG: 500 INJECTION INTRAMUSCULAR; INTRAVENOUS at 07:02:00

## 2024-04-18 RX ADMIN — PHENYLEPHRINE HCL 200 MCG: 10 MG/ML VIAL (ML) INJECTION at 08:44:00

## 2024-04-18 RX ADMIN — ROCURONIUM BROMIDE 100 MG: 10 INJECTION, SOLUTION INTRAVENOUS at 08:33:00

## 2024-04-18 RX ADMIN — MIDAZOLAM HYDROCHLORIDE 2 MG: 1 INJECTION INTRAMUSCULAR; INTRAVENOUS at 06:38:00

## 2024-04-18 RX ADMIN — PROTAMINE SULFATE 50 MG: 10 INJECTION, SOLUTION INTRAVENOUS at 10:00:00

## 2024-04-18 RX ADMIN — PROTAMINE SULFATE 50 MG: 10 INJECTION, SOLUTION INTRAVENOUS at 09:57:00

## 2024-04-18 RX ADMIN — ROCURONIUM BROMIDE 100 MG: 10 INJECTION, SOLUTION INTRAVENOUS at 06:43:00

## 2024-04-18 RX ADMIN — PROTAMINE SULFATE 50 MG: 10 INJECTION, SOLUTION INTRAVENOUS at 09:59:00

## 2024-04-18 RX ADMIN — DEXMEDETOMIDINE HYDROCHLORIDE 0.5 MCG/KG/HR: 4 INJECTION, SOLUTION INTRAVENOUS at 07:07:00

## 2024-04-18 NOTE — ANESTHESIA PROCEDURE NOTES
Airway  Date/Time: 4/18/2024 6:45 AM  Urgency: elective    Airway not difficult    General Information and Staff    Patient location during procedure: OR  Anesthesiologist: Walter Roberson MD  Performed: anesthesiologist   Performed by: Walter Roberson MD  Authorized by: Walter Roberson MD      Indications and Patient Condition  Indications for airway management: anesthesia  Sedation level: deep  Preoxygenated: yes  Patient position: sniffing  Mask difficulty assessment: 1 - vent by mask    Final Airway Details  Final airway type: endotracheal airway      Successful airway: ETT  Cuffed: yes   Successful intubation technique: direct laryngoscopy  Endotracheal tube insertion site: oral  Blade: [place]  Blade size: #3  ETT size (mm): 8.0    Cormack-Lehane Classification: grade I - full view of glottis  Placement verified by: capnometry   Cuff volume (mL): 9  Measured from: lips  ETT to lips (cm): 22  Number of attempts at approach: 1

## 2024-04-18 NOTE — PLAN OF CARE
Received patient from cvor s/p CABGx2. Usual lines. Propofol/precedex weaned off. Fem line removed per protocol. Patient following commands. Cpap trial completed. Orders received to extubate. Currently on nasal cannula. Epi weaned off. Weaning dobs. Insulin per protocol. Dilaudid pca instructed on use prn for pain. Family at bedside. Questions encouraged/answered. Plan of care reviewed. Vss. See assessment for complete details. Will continue to monitor.

## 2024-04-18 NOTE — ANESTHESIA POSTPROCEDURE EVALUATION
[facility]    Hilda Bello Patient Status:  Inpatient   Age/Gender 59 year old female MRN [id number]   Location [facility] 6NE-A Attending Gal Oquendo MD   Hosp Day # 0 PCP Syd Gomez MD       Anesthesia Post-op Note    CORONARY ARTERY BYPASS GRAFT x 2 USING LIMA, LEFT SAPHENOUS VEIN. INTRAOPERATIVE TAMIKA    Procedure Summary       Date: 04/18/24 Room / Location:  CVOR 02 /  CVOR    Anesthesia Start: 0638 Anesthesia Stop: 1103    Procedure: CORONARY ARTERY BYPASS GRAFT x 2 USING LIMA, LEFT SAPHENOUS VEIN. INTRAOPERATIVE TAMIKA Diagnosis: (coronary artery disease)    Surgeons: Gal Oquendo MD Anesthesiologist: Walter Roberson MD    Anesthesia Type: general ASA Status: 3            Anesthesia Type: general    Vitals Value Taken Time   /63 04/18/24 1102   Temp 97.1 °F (36.2 °C) 04/18/24 1102   Pulse 102 04/18/24 1102   Resp 14 04/18/24 1102   SpO2 100 % 04/18/24 1102   Vitals shown include unfiled device data.    Patient Location: ICU    Anesthesia Type: general    Airway Patency: intubated    Postop Pain Control: Other: (Intubated and sedated)    Mental Status: Other: (Intubated and sedated)    Nausea/Vomiting: Other: (Intubated and sedated)    Cardiopulmonary/Hydration status: stable euvolemic    Complications: no apparent anesthesia related complications    Postop vital signs: stable    Dental Exam: Unchanged from Preop    Sign out given to ICU staff.

## 2024-04-18 NOTE — OPERATIVE REPORT
Operative Report     Patient Name: Hilda Bello     Pre-Op Dx: CAD     Post-Op Dx: same     Procedure: CABG x 2; LIMA to LAD, SVG to OM     Surgeon: GI Oquendo MD     Asst: DWAINE Madera PA-C     Anesthesia: Cardiac     Complications: none     Specimen: none     Implants: none     Drains: 36 Fr CT x 2     EBL: ~800cc     Dispo: critical but stable on transfer

## 2024-04-18 NOTE — CONSULTS
[place] HOSPITALIST  CONSULT     Hilda Bello Patient Status:  Inpatient    3/6/1965 MRN [id number]   Location [facility] 6NE-A Attending Gal Oquendo MD   Hosp Day # 0 PCP Syd Gomez MD     Reason for consult: Medical management    Requested by: Dr. Gal Oquendo    Subjective:   History of Present Illness:     Hilda Bello is a 59 year old female with PMHx recently dx MVCAD, HLD, anxiety, depression presents to the hospital for scheduled CABG. Pt had outpatient elevated CAC score, subsequent stress had concerns that warranted a LHC which showed MVCAD. Pt underwent successful cabg. Pt seen post op in the CNICU. Pt remains intubated. No family at the bedside. Pt on SBT.     History/Other:    Past Medical History:  Past Medical History:    Anxiety    Anxiety state    Arthritis    Bloating    Coronary atherosclerosis    Depression    Disorder of thyroid    Fatigue    Headache disorder    High cholesterol    Hyperlipidemia    Migraines    Night sweats    Pain in joints    Stress    Visual impairment    CONTACTS    Wears glasses    Weight gain     Past Surgical History:   Past Surgical History:   Procedure Laterality Date    Lily biopsy stereo nodule 1 site left (cpt=19081) Left 2020    [facility] localization wire 1 site left (cpt=19281) Left 2021    ADH      2002    Other      Dilation and curettage    [place] teeth removed      X4      Family History:   Family History   Problem Relation Age of Onset    Hypertension Father     Cancer Mother         multiple myeloma    Ovarian Cancer Sister 58     Social History:    reports that she quit smoking about 35 years ago. Her smoking use included cigarettes. She started smoking about 38 years ago. She has a 1.5 pack-year smoking history. She has never used smokeless tobacco. She reports current alcohol use. She reports current drug use.     Allergies:   Allergies   Allergen Reactions    Statins MYALGIA       Medications:    Current  Facility-Administered Medications on File Prior to Encounter   Medication Dose Route Frequency Provider Last Rate Last Admin    [COMPLETED] sodium chloride 0.9% infusion   Intravenous On Call Moses Jacome MD   Stopped at 04/11/24 1336    [COMPLETED] lidocaine PF (Xylocaine-MPF) 1 % injection             [COMPLETED] verapamil (Isoptin) 2.5 mg/mL injection             [COMPLETED] heparin (Porcine) 5000 UNIT/ML injection             [COMPLETED] Nitroglycerin in D5W 200-5 MCG/ML-% injection             [COMPLETED] fentaNYL (Sublimaze) 50 mcg/mL injection             [COMPLETED] midazolam (Versed) 2 MG/2ML injection             [COMPLETED] iohexol (OMNIPAQUE) 350 MG/ML injection 100 mL  100 mL Intravascular ONCE PRN Holley Schumacher MD   80 mL at 04/11/24 1310     Current Outpatient Medications on File Prior to Encounter   Medication Sig Dispense Refill    sertraline 100 MG Oral Tab Take 1 tablet (100 mg total) by mouth daily. 90 tablet 1    ezetimibe 10 MG Oral Tab Take 1 tablet (10 mg total) by mouth nightly. 90 tablet 3    Evolocumab (REPATHA SURECLICK) 140 MG/ML Subcutaneous Solution Auto-injector Inject 1 mL into the skin every 14 (fourteen) days. 2 mL 5    ergocalciferol 1.25 MG (55186 UT) Oral Cap Take 1 capsule (50,000 Units total) by mouth once a week. Once weekly x 20 weeks.  Take with food 20 capsule 0       Review of Systems:   A comprehensive review of systems was completed.    Pertinent positives and negatives noted in the HPI.    Objective:   Physical Exam:    /53   Pulse 105   Temp 98.7 °F (37.1 °C) (Pulmonary Artery)   Resp 19   Ht 5' 7\" (1.702 m)   Wt 172 lb 11.2 oz (78.3 kg)   SpO2 100%   BMI 27.05 kg/m²   General: No acute distress, on ventilator  Respiratory: mechanical breath sounds  Cardiovascular: S1, S2. Regular rate and rhythm  Abdomen: Soft, NT/ND, +BS  Neuro: No new focal deficits  Extremities: No edema      Results:    Labs:      Labs Last 24 Hours:  Recent Labs   Lab  04/15/24  0911 04/18/24  1012 04/18/24  1106   WBC 4.4  --  21.4*   HGB 14.3  --  11.2*   MCV 90.4  --  89.4   .0 178.0 182.0   INR 0.98 1.58* 1.39*       Recent Labs   Lab 04/15/24  0911 04/18/24  1106 04/18/24  1410   * 163*  --    BUN 12 11  --    CREATSERUM 0.82 0.90  --    CA 9.7 8.6  --    ALB 4.1  --   --     142  --    K 4.5 2.9* 3.5    112  --    CO2 29.0 26.0  --    ALKPHO 78  --   --    AST 10*  --   --    ALT 22  --   --    BILT 0.6  --   --    TP 8.1  --   --        Recent Labs   Lab 04/15/24  0911 04/18/24  1012 04/18/24  1106   PTP 13.0 18.9* 17.2*   INR 0.98 1.58* 1.39*       No results for input(s): \"TROP\", \"CK\" in the last 168 hours.      Imaging: Imaging data reviewed in Epic.    Assessment & Plan:      #Multivessel CAD  -CVS primary  -s/p scheduled CABGx2 on 4/18  -post op vasopressor support, wean as tolerated  -post op insulin gtt, A1c 5.8; ISS signed and held if patient gets extubated    #Expected post op resp support  -remains ventilator dependent, on SBT, hopefully extubated later today    #Anxiety    #HLD          Plan of care discussed with staff    Ale Bansal DO  4/18/2024    The 21st Century Cures Act makes medical notes like these available to patients in the interest of transparency. Please be advised this is a medical document. Medical documents are intended to carry relevant information, facts as evident, and the clinical opinion of the practitioner. The medical note is intended as peer to peer communication and may appear blunt or direct. It is written in medical language and may contain abbreviations or verbiage that are unfamiliar.

## 2024-04-18 NOTE — ANESTHESIA PROCEDURE NOTES
Arterial Line    Date/Time: 4/18/2024 6:46 AM    Performed by: Walter Roberson MD  Authorized by: Walter Roberson MD    General Information and Staff    Procedure Start:  4/18/2024 6:46 AM  Procedure End:  4/18/2024 6:49 AM  Anesthesiologist:  Walter Roberson MD  Performed By:  Anesthesiologist  Patient Location:  OR  Indication: continuous blood pressure monitoring and blood sampling needed    Site Identification: real time ultrasound guided and surface landmarks    Preanesthetic Checklist: 2 patient identifiers, IV checked, risks and benefits discussed, monitors and equipment checked, pre-op evaluation, timeout performed, anesthesia consent and sterile technique used    Procedure Details    Catheter Size:  20 G  Catheter Length:  1 and 3/4 inch  Catheter Type:  Arrow  Seldinger Technique?: Yes    Laterality:  Left  Site:  Radial artery  Site Prep: chlorhexidine    Line Secured:  Wrist Brace, tape and Tegaderm    Assessment    Events: patient tolerated procedure well with no complications      Medications  4/18/2024 6:46 AM      Additional Comments

## 2024-04-18 NOTE — CM/SW NOTE
Order received for HH eval.    Pt is a 59 year old female admitted for CABG.  She was assessed by CM during her PAT appointment.  Noted [facility] unable to accept preoperatively so referral sent in [facility] for HH providers.    / to remain available for support and/or discharge planning.     Rose Marquez MBA MSN, RN CTL/  [id number]

## 2024-04-18 NOTE — ANESTHESIA PROCEDURE NOTES
Procedure Performed: TAMIKA       Start Time:  4/18/2024 7:15 AM       End Time:   4/18/2024 10:30 AM    Preanesthesia Checklist:  Patient identified, IV assessed, risks and benefits discussed, monitors and equipment assessed, procedure being performed at surgeon's request and anesthesia consent obtained.    General Procedure Information  Diagnostic Indications for Echo:  assessment of ascending aorta and hemodynamic monitoring  Physician Requesting Echo: Gal Oquendo MD  Location performed:  OR  Intubated  Bite block placed  Heart visualized  Probe Insertion:  Easy  Probe Type:  Multiplane  Modalities:  2D only, color flow mapping, pulse wave Doppler and continuous wave Doppler    Echocardiographic and Doppler Measurements    Ventricles    Right Ventricle:  Cavity size normal.  Hypertrophy not present.  Thrombus not present.  Global function normal.    Left Ventricle:  Cavity size normal.  Hypertrophy not present.  Thrombus not present.  Global Function normal.  Ejection Fraction 55%.      Ventricular Regional Function:  1- Basal Anteroseptal:  normal  2- Basal Anterior:  normal  3- Basal Anterolateral:  normal  4- Basal Inferolateral:  normal  5- Basal Inferior:  normal  6- Basal Inferoseptal:  normal  7- Mid Anteroseptal:  normal  8- Mid Anterior:  normal  9- Mid Anterolateral:  normal  10- Mid Inferolateral:  normal  11- Mid Inferior:  normal  12- Mid Inferoseptal:  normal  13- Apical Anterior:  normal  14- Apical Lateral:  normal  15- Apical Inferior:  normal  16- Apical Septal:  normal  17- [place]:  normal      Valves    Aortic Valve:  Annulus normal.  Stenosis not present.  Regurgitation +1.  Leaflets normal.  Leaflet motions normal.      Mitral Valve:  Annulus normal.  Stenosis not present.  Regurgitation +1.  Leaflets normal.  Leaflet motions normal.      Tricuspid Valve:  Annulus normal.  Stenosis not present.  Regurgitation +1.  Leaflets normal.  Leaflet motions normal.    Pulmonic Valve:  Annulus normal.         Aorta    Ascending Aorta:  Size normal.  Dissection not present.  Plaque thickness less than 3 mm.  Mobile plaque not present.    Aortic Arch:  Size normal.  Dissection not present.  Plaque thickness less than 3 mm.  Mobile plaque not present.    Descending Aorta:  Size normal.  Dissection not present.  Plaque thickness less than 3 mm.  Mobile plaque not present.        Atria    Right Atrium:  Size normal.  Spontaneous echo contrast not present.  Thrombus not present.  Tumor not present.  Device present.    Left Atrium:  Size normal.  Spontaneous echo contrast not present.  Thrombus not present.  Tumor not present.  Device not present.    Left atrial appendage normal.      Septa    Atrial Septum:  Intra-atrial septal morphology normal.      Ventricular Septum:  Intra-ventricular septum morphology normal.          Other Findings  Pericardium:  normal  Pleural Effusion:  none  Pulmonary Arteries:  normal  Pulmonary Venous Flow:  normal    Anesthesia Information  Performed Personally  Anesthesiologist:  Walter Roberson MD      Post    Ventricular FXN:  Global FXN: Improved   Regional FXN: Unchanged  Valve FXN:  Native Valve:No change            Comments: No aortic dissection    Complications:None

## 2024-04-18 NOTE — PROGRESS NOTES
Anesthesia Ventilator Management    Patient is s/p 2V CABG  POD#0.  Patient is currently sedated and intubated.  Vent settings: PRVC 500/14/50%/5  ABG, CXR pending    Dex@ 0.5  Prop@ 25  Epi@ 3  @ 5    Will wean FiO2 as tolerated.  Plan for extubation after CPAP trial.

## 2024-04-18 NOTE — DISCHARGE INSTRUCTIONS
To access the Dr. Oquendo discharge instructions video on your home computer:   [email or web address]  Remove steri strips from all incisions on 5/2    Sometimes managing your health at home requires assistance.  The [facility] team has recognized your preference to use [facility].  They can be reached by phone at [phone].  The fax number for your reference is [phone].  A representative from the home health agency will contact you or your family to schedule your first visit.

## 2024-04-18 NOTE — ANESTHESIA PROCEDURE NOTES
Central Line    Date/Time: 4/18/2024 6:50 AM    Performed by: Walter Roberson MD  Authorized by: Walter Roberson MD    General Information and Staff    Procedure Start:  4/18/2024 6:50 AM  Procedure End:  4/18/2024 7:00 AM  Anesthesiologist:  Walter Roberson MD  Performed by:  Anesthesiologist  Patient Location:  OR  Indication: central venous access and CVP monitoring    Site Identification: real time ultrasound guided and image stored and retrievable    Preanesthetic Checklist: 2 patient identifiers, IV checked, risks and benefits discussed, monitors and equipment checked, pre-op evaluation, timeout performed, anesthesia consent and sterile technique used    Procedure Detail    Patient Position:  Trendelenburg  Laterality:  Right  Site:  Internal jugular  Prep:  Chloraprep  Catheter Size:  9 Fr  Catheter Type:  MAC introducer  Number of Lumens:  Double lumen  Procedure Detail: target vein identified, needle advanced into vein and blood aspirated and guidewire advanced into vein    Seldinger Technique?: Yes    Intravenous Verification: verified by ultrasound and venous blood return    Post Insertion: all ports aspirated, all ports flushed easily, guidewire was removed intact, line was sutured in place and dressing was applied      Assessment    Events: patient tolerated procedure well with no complications      PA Catheter Placement    PA Catheter Placed?: Yes    PA Catheter Type:  Oximetric  PA Catheter Size:  8  Laterality:  Right  Site:  Internal jugular  Placement Confirmation: pressure tracing changes and verified by TAMIKA    PA Catheter Depth (cm):  50  Events: patient tolerated procedure well with no complications      Additional Comments

## 2024-04-18 NOTE — CONSULTS
[facility]  Report of Consultation    Hilda Bello Patient Status:  Inpatient    3/6/1965 MRN [id number]   Location [facility] 6NE-A Attending Gal Oquendo MD   Hosp Day # 0 PCP Syd Gomez MD     Reason for Consultation:  Cabg x 2 (lima to lad, svg to om)    History of Present Illness:  Hilda Bello is a a(n) 59 year old female, followed by dr lira, with hx dyslipidemia, anxiety and pud who was noted to have an elevated cac. Further evaluation included a GAUTAM which was notable for an inferior wall motion abn. Subsequent cath revered severe cad with an occluded lad which filled by collaterals as well as moderate pLCX disease. Echo revealed preserved lvef with mild mr, as.  For the most part, the patient was asymptomatic despite these findings.     Pt [facility] given her anatomy and ultimately presented electively today for surgical revascularization.     History:  Past Medical History:    Anxiety    Anxiety state    Arthritis    Bloating    Blood disorder    Coronary atherosclerosis    Depression    Disorder of thyroid    Fatigue    Headache disorder    High cholesterol    Hyperlipidemia    Migraines    Night sweats    Pain in joints    Stress    Visual impairment    CONTACTS    Wears glasses    Weight gain     Past Surgical History:   Procedure Laterality Date    Lily biopsy stereo nodule 1 site left (cpt=19081) Left 2020    Lily localization wire 1 site left (cpt=19281) Left 2021    ADH      2002    Other      Dilation and curettage    [place] teeth removed      X4     Family History   Problem Relation Age of Onset    Hypertension Father     Cancer Mother         multiple myeloma    Ovarian Cancer Sister 58      reports that she quit smoking about 35 years ago. Her smoking use included cigarettes. She started smoking about 38 years ago. She has a 1.5 pack-year smoking history. She has never used smokeless tobacco. She reports current alcohol use. She reports current  drug use.    Allergies:  Allergies   Allergen Reactions    Statins MYALGIA       Medications:    Current Facility-Administered Medications:     midazolam (Versed) 2 MG/2ML injection 1 mg, 1 mg, Intravenous, Q30 Min PRN    propofol (Diprivan) 10 mg/mL infusion premix, 5-50 mcg/kg/min (Dosing Weight), Intravenous, Continuous    chlorhexidine gluconate (Peridex) 0.12 % oral solution 15 mL, 15 mL, Mouth/Throat, BID@0800,2000    dexmedeTOMIDine in sodium chloride 0.9% (Precedex) 400 mcg/100mL infusion premix, 0.2-1.5 mcg/kg/hr (Dosing Weight), Intravenous, Continuous    ipratropium-albuterol (Duoneb) 0.5-2.5 (3) MG/3ML inhalation solution 3 mL, 3 mL, Nebulization, Q4H PRN    [START ON 4/19/2024] sertraline (Zoloft) tab 100 mg, 100 mg, Oral, Daily    ezetimibe (Zetia) tab 10 mg, 10 mg, Oral, Nightly    sodium chloride 0.9% infusion, , Intravenous, Continuous    morphINE PF 2 MG/ML injection 2 mg, 2 mg, Intravenous, Q2H PRN **OR** morphINE PF 4 MG/ML injection 4 mg, 4 mg, Intravenous, Q2H PRN    melatonin tab 3 mg, 3 mg, Oral, Nightly PRN    polyethylene glycol (PEG 3350) (Miralax) 17 g oral packet 17 g, 17 g, Oral, Daily PRN    sennosides (Senokot) tab 17.2 mg, 17.2 mg, Oral, Nightly PRN    bisacodyl (Dulcolax) 10 MG rectal suppository 10 mg, 10 mg, Rectal, Daily PRN    fleet enema (Fleet) 7-19 GM/118ML rectal enema 133 mL, 1 enema, Rectal, Once PRN    ondansetron (Zofran) 4 MG/2ML injection 4 mg, 4 mg, Intravenous, Q6H PRN    vancomycin (Vancocin) 1.25 g in sodium chloride 0.9% 250mL IVPB premix, 15 mg/kg, Intravenous, Q12H    albumin human (Albumin) 5% injection 12.5 g, 12.5 g, Intravenous, Once PRN    DOBUTamine in dextrose 5% (Dobutrex) 500 mg/250mL infusion premix, 2.5-20 mcg/kg/min (Dosing Weight), Intravenous, Continuous PRN    nitroGLYCERIN in dextrose 5% 50 mg/250mL infusion premix, 5-300 mcg/min, Intravenous, Continuous PRN    norepinephrine (Levophed) 4 mg/250mL infusion premix, 0.5-30 mcg/min, Intravenous,  Continuous PRN    NitroPRUSSide (Nipride) 50 mg in dextrose 5% 250 mL infusion, 0.1-4 mcg/kg/min (Dosing Weight), Intravenous, Continuous PRN    [START ON 4/19/2024] metoprolol tartrate (Lopressor) partial tab 12.5 mg, 12.5 mg, Oral, 2x Daily(Beta Blocker)    potassium chloride 20 mEq/100mL IVPB premix 20 mEq, 20 mEq, Intravenous, PRN **OR** potassium chloride 40 mEq/100mL IVPB premix (central line) 40 mEq, 40 mEq, Intravenous, PRN    calcium gluconate 3 g in sodium chloride 0.9% 100 mL IVPB, 3 g, Intravenous, PRN    magnesium sulfate in dextrose 5% 1 g/100mL infusion premix 1 g, 1 g, Intravenous, PRN    magnesium sulfate in sterile water for injection 2 g/50mL IVPB premix 2 g, 2 g, Intravenous, PRN    mupirocin (Bactroban) 2% nasal ointment 1 Application, 1 Application, Nasal, BID    dextrose 5%-sodium chloride 0.45% infusion,  mL/hr, Intravenous, Continuous    acetaminophen (Ofirmev) 10 mg/mL infusion premix 1,000 mg, 1,000 mg, Intravenous, Q6H    [START ON 4/19/2024] aspirin DR tab 81 mg, 81 mg, Oral, Daily    [START ON 4/19/2024] pantoprazole (Protonix) 40 mg in sodium chloride 0.9% PF 10 mL IV push, 40 mg, Intravenous, QAM AC **OR** [START ON 4/19/2024] pantoprazole (Protonix) DR tab 40 mg, 40 mg, Oral, QAM AC    ceFAZolin (Ancef) 2 g in 20mL IV syringe premix, 2 g, Intravenous, Q8H    sodium chloride 0.9% infusion, , Intravenous, Continuous    HYDROmorphone in sodium chloride 0.9% (Dilaudid) 20 mg/100mL PCA premix, , Intravenous, Continuous    naloxone (Narcan) 0.4 MG/ML injection 0.08 mg, 0.08 mg, Intravenous, Q5 Min PRN    diphenhydrAMINE (Benadryl) 50 mg/mL  injection 12.5 mg, 12.5 mg, Intravenous, Q4H PRN    insulin regular human (Novolin R, Humulin R) 100 Units in sodium chloride 0.9% 100 mL standard infusion (100 mL), 1-57 Units/hr, Intravenous, Continuous    glucose (Dex4) 15 GM/59ML oral liquid 15 g, 15 g, Oral, Q15 Min PRN **OR** glucose (Glutose) 40% oral gel 15 g, 15 g, Oral, Q15 Min PRN  **OR** glucose-vitamin C (Dex-4) chewable tab 4 tablet, 4 tablet, Oral, Q15 Min PRN **OR** dextrose 50% injection 50 mL, 50 mL, Intravenous, Q15 Min PRN **OR** glucose (Dex4) 15 GM/59ML oral liquid 30 g, 30 g, Oral, Q15 Min PRN **OR** glucose (Glutose) 40% oral gel 30 g, 30 g, Oral, Q15 Min PRN **OR** glucose-vitamin C (Dex-4) chewable tab 8 tablet, 8 tablet, Oral, Q15 Min PRN    EPINEPHrine (Adrenalin) 5 mg in sodium chloride 0.9% 250 mL infusion, 1-10 mcg/min, Intravenous, Continuous    Review of Systems:  All systems were reviewed and are negative except as described above in HPI.    Physical Exam:  Blood pressure 106/60, pulse 96, temperature (!) 95.5 °F (35.3 °C), temperature source Pulmonary Artery, resp. rate 14, height 5' 7\" (1.702 m), weight 172 lb 11.2 oz (78.3 kg), SpO2 98%, not currently breastfeeding.  Temp (24hrs), Av °F (35.6 °C), Min:95.4 °F (35.2 °C), Max:97.4 °F (36.3 °C)    Wt Readings from Last 3 Encounters:   24 172 lb 11.2 oz (78.3 kg)   04/15/24 176 lb 4 oz (79.9 kg)   24 176 lb (79.8 kg)       Telemetry: Sinus rhythm with incomplete bundle branch block  General: Intubated sedated   HEENT: ETT.  Neck: No JVD, RIJ [place]  Cardiac: Regular rate and rhythm, S1, S2 normal, no murmur, rub or gallop.  Lungs: Clear anteriorly on vent.  Abdomen: Soft  Extremities: Without clubbing, cyanosis or edema.  Peripheral pulses are 2+.  Neurologic: Sedated  Skin: Warm and dry.     Laboratories and Data:  Diagnostics:  EKG: sinus tach, incomplete RBBB    Echo:  Hilda Bello - 59 y.o. Female; born [birth year]Trans Thoracic Echocardiogram, generated on pril 2024   Findings    Findings - Right Atrium  The right atrium is normal in size. Right atrial diameter is 3.6 cms.    Findings - Left Ventricle  The left ventricle is normal in size. Left ventricular diastolic dimension is 4.9 cms. Left ventricular systolic dimension is 3.2 cms. Left ventricular diastolic septal  thickness is 1.0 cm. Left ventricular diastolic postero basal free wall thickness is 1.0 cm. Global left ventricular systolic function is normal. The left ventricular fractional shortening is 35.3%. The left ventricular ejection fraction is 65%. Left ventricular diastolic function is normal. Left ventricular outflow tract diameter is 2.1 cms. Left ventricular outflow tract VTI is 30.5 cms. Left ventricular outflow tract peak velocity is 1.4 m/s. Left ventricular peak gradient is 8 mmHg. Left ventricular outflow tract mean velocity is 0.9 m/s. Left ventricular mean gradient is 4 mmHg. No regional wall motion abnormalities.   Findings - Right Ventricle  The right ventricle is normal in size. Right ventricular systolic function is normal. Right ventricular diastolic dimension is 3.5 cms. Right ventricular systolic pressure is 21 mmHg. TAPSE = 2.69cm.   Findings - Atrial Septum  The atrial septum is normal.    Findings - Ventricular Septum  The ventricular septum is normal.    Findings - Pulmonary Artery  The estimated pulmonary artery systolic pressure is 21 mmHg assuming a right atrial pressure of 3 mmHg.    Findings - Pulmonary Vein  The pulmonary vein appears normal.    Findings - IVC  The inferior vena cava is mildly increased in size. The inferior vena cava changes less than 50 percent during respiration. IVC measures 2.23cm.   Findings - Pulmonic Valve  Good excursion of the pulmonic valve cusps is noted. Evidence of pulmonic regurgitation is noted. Trace pulmonic regurgitation. The peak velocity is 0.9 m/s. The mean velocity is 0.7 m/s. The peak trans pulmonic gradient is 3.0 mmHg. The mean trans pulmonic gradient is 2.0 mmHg. Pulmonic valve appears normal.   Findings - Tricuspid Valve  Evidence of tricuspid regurgitation is present. Trace tricuspid regurgitation. The peak tricuspid regurgitant velocity is 1.8 m/s. The peak trans tricuspid gradient is 13.0 mmHg. Tricuspid valve appears normal.   Findings -  Mitral Valve  Mobility of the anterior mitral leaflet is normal. Mobility of the posterior mitral leaflet is normal. The area by pressure half time is 2.0 cm². The mean trans mitral gradient is 1.0 mmHg. Mitral regurgitation is noted. Trace mitral regurgitation. The pressure half time is 108 ms. The deceleration time is 268 ms. The isovolumic relaxation time is 92 ms. The peak mitral gradient is 2 mmHg. The E velocity is 0.7 m/s. The A velocity is 0.6 m/s. The E/E' is 7.4. Mitral valve appears normal.   Findings - Aortic Valve  The aortic valve is tricuspid. Aortic cusp separation measures 1.9 cms . Aortic valve area continuity equation is 2.3 cm². The trans-aortic peak velocity is 2.0 m/s. The trans-aortic peak gradient is 15 mmHg. The trans-aortic mean velocity is 1.4 m/s. The trans-aortic mean gradient is 9.0 mmHg. Aortic valve VTI measures 46.1 cms. LVOT Diameter is 2.1 cms. Aortic valve appears normal.   Findings - Aorta  Aortic root diameter is 2.8 cms. Ascending aorta diameter is 3.1 cms. The aortic arch is normal. Aortic arch diameter is 3.2 cms.    Findings - Pericardium  The pericardium is normal in appearance with no pericardial effusion noted..    Impressions    Impression - TTE  The study quality is good.    Impression - TTE  The left ventricle is normal in size, wall thickness, and global left ventricular systolic function. The left ventricular ejection fraction is 65%. Left ventricular diastolic function is normal. No regional wall motion abnormalities.   Impression - TTE  The right ventricle is normal in size. Right ventricular systolic function is normal.   Impression - TTE  Trivial to mild mitral regurgitation   Impression - TTE  Tricuspid aortic valve without any significant stenosis or insufficiency. Mean gradient 8 mm Hg.    Impression - TTE  The estimated pulmonary artery systolic pressure is 21 mmHg assuming a right atrial pressure of 3 mmHg.       Stress Test: as noted in  HPI    Cath:4/11/24  1.     Severe disease with occluded left anterior descending that fills via collaterals from the conus antegrade, as well as retrograde from the posterior descending artery.  2.     At least moderate disease in the circumflex.  3.     Intact left ventricular systolic function.      CXR     Endotracheal tube terminates 4.5 centimeters above the sanjeev.  NG/OG tube courses below the diaphragm, tip is not visualized.  Right IJ [place]-Ann-Marie catheter overlies the pulmonary outflow tract.  There is a left basilar thoracostomy tube.    Numerous devices overlie the patient mildly limiting assessment.  No new focal consolidation.  Suspected trace left pleural effusion.  Median sternotomy changes and surgical clips in the mediastinum are present.  There is no measurable pneumothorax.     Labs:   Lab Results   Component Value Date    WBC 21.4 04/18/2024    RBC 3.59 04/18/2024    HGB 11.2 04/18/2024    HCT 32.1 04/18/2024    MCV 89.4 04/18/2024    MCH 31.2 04/18/2024    MCHC 34.9 04/18/2024    RDW 13.1 04/18/2024    .0 04/18/2024     Lab Results   Component Value Date    INR 1.39 (H) 04/18/2024    INR 1.58 (H) 04/18/2024    INR 0.98 04/15/2024       Assessment/Plan:  Patient Active Problem List   Diagnosis    Mixed hyperlipidemia    Iron deficiency anemia due to chronic blood loss    Dysfunctional uterine bleeding    Perimenopausal menorrhagia    Dense breast tissue    Anxiety and depression    Chronic migraine    Atypical ductal hyperplasia of left breast    Special screening for malignant neoplasm of colon    Benign neoplasm of cecum    Colon polyps    Diverticulosis of colon    Internal hemorrhoids    Coronary artery disease involving native coronary artery of native heart without angina pectoris       Cad s/p cabg x 2 (lima to lad, svg to om)- POD 0- ef preserved  Hemodynamically stable on current inotropic/vasoactive support.  CO/CI 7.2/3.8.  PAP 22/12.  CVP 13.  SpO2 81%  EKG sinus rhythm with  incomplete right bundle branch block  Good UOP cr 0.9.  CT OP as expected.  Hgb 11.2  HL- ldl 127- on repatha/zetia pta      Plan:    Routine postoperative CCU recovery  Wean drips as able  Anticipate we will wean to extubate later today pending clinical course      MATTHEW Lora  4/18/2024  12:39 PM    Patient seen and examined independently.  Note reviewed and labs reviewed.  Agree with above assessment and plan.  Seen early post-op.  Still intubated and sedated.  Hemodynamics stable.  SGC location verified on CXR.  Continue CCU care.  Wean pressor support.  Likely extubated later today.

## 2024-04-18 NOTE — DIETARY NOTE
Clinical Nutrition     Dietitian consult received per cardiac rehab standing order. Pt to be educated by cardiac rehab staff and encouraged to attend outpatient classes taught by LEXI. LEXI available PRN.    Mayra Ellis RD, LDN, [facility]  Clinical Dietitian  Phone [phone]

## 2024-04-19 ENCOUNTER — APPOINTMENT (OUTPATIENT)
Dept: GENERAL RADIOLOGY | Facility: HOSPITAL | Age: 59
End: 2024-04-19
Attending: PHYSICIAN ASSISTANT

## 2024-04-19 ENCOUNTER — APPOINTMENT (OUTPATIENT)
Dept: GENERAL RADIOLOGY | Facility: HOSPITAL | Age: 59
DRG: 236 | End: 2024-04-19
Attending: PHYSICIAN ASSISTANT

## 2024-04-19 LAB
ATRIAL RATE: 101 BPM
ATRIAL RATE: 111 BPM
BASOPHILS # BLD AUTO: 0.02 X10(3) UL (ref 0–0.2)
BASOPHILS NFR BLD AUTO: 0.2 %
BUN BLD-MCNC: 8 MG/DL (ref 9–23)
CALCIUM BLD-MCNC: 8 MG/DL (ref 8.5–10.1)
CHLORIDE SERPL-SCNC: 112 MMOL/L (ref 98–112)
CO2 SERPL-SCNC: 28 MMOL/L (ref 21–32)
CREAT BLD-MCNC: 0.68 MG/DL
EGFRCR SERPLBLD CKD-EPI 2021: 102 ML/MIN/1.73M2 (ref 60–?)
EOSINOPHIL # BLD AUTO: 0 X10(3) UL (ref 0–0.7)
EOSINOPHIL NFR BLD AUTO: 0 %
ERYTHROCYTE [DISTWIDTH] IN BLOOD BY AUTOMATED COUNT: 13.7 %
GLUCOSE BLD-MCNC: 101 MG/DL (ref 70–99)
GLUCOSE BLD-MCNC: 104 MG/DL (ref 70–99)
GLUCOSE BLD-MCNC: 105 MG/DL (ref 70–99)
GLUCOSE BLD-MCNC: 108 MG/DL (ref 70–99)
GLUCOSE BLD-MCNC: 110 MG/DL (ref 70–99)
GLUCOSE BLD-MCNC: 111 MG/DL (ref 70–99)
GLUCOSE BLD-MCNC: 112 MG/DL (ref 70–99)
GLUCOSE BLD-MCNC: 112 MG/DL (ref 70–99)
GLUCOSE BLD-MCNC: 138 MG/DL (ref 70–99)
GLUCOSE BLD-MCNC: 138 MG/DL (ref 70–99)
GLUCOSE BLD-MCNC: 153 MG/DL (ref 70–99)
GLUCOSE BLD-MCNC: 99 MG/DL (ref 70–99)
HCT VFR BLD AUTO: 29 %
HGB BLD-MCNC: 9.7 G/DL
IMM GRANULOCYTES # BLD AUTO: 0.05 X10(3) UL (ref 0–1)
IMM GRANULOCYTES NFR BLD: 0.4 %
LYMPHOCYTES # BLD AUTO: 1.18 X10(3) UL (ref 1–4)
LYMPHOCYTES NFR BLD AUTO: 9.6 %
MAGNESIUM SERPL-MCNC: 2.2 MG/DL (ref 1.6–2.6)
MCH RBC QN AUTO: 30.8 PG (ref 26–34)
MCHC RBC AUTO-ENTMCNC: 33.4 G/DL (ref 31–37)
MCV RBC AUTO: 92.1 FL
MONOCYTES # BLD AUTO: 1.27 X10(3) UL (ref 0.1–1)
MONOCYTES NFR BLD AUTO: 10.4 %
NEUTROPHILS # BLD AUTO: 9.75 X10 (3) UL (ref 1.5–7.7)
NEUTROPHILS # BLD AUTO: 9.75 X10(3) UL (ref 1.5–7.7)
NEUTROPHILS NFR BLD AUTO: 79.4 %
P AXIS: 36 DEGREES
P AXIS: 47 DEGREES
P-R INTERVAL: 152 MS
P-R INTERVAL: 160 MS
PLATELET # BLD AUTO: 134 10(3)UL (ref 150–450)
PLATELETS.RETICULATED NFR BLD AUTO: 4.6 % (ref 0–7)
POTASSIUM SERPL-SCNC: 3.9 MMOL/L (ref 3.5–5.1)
Q-T INTERVAL: 344 MS
Q-T INTERVAL: 376 MS
QRS DURATION: 88 MS
QRS DURATION: 98 MS
QTC CALCULATION (BEZET): 446 MS
QTC CALCULATION (BEZET): 511 MS
R AXIS: -1 DEGREES
R AXIS: -1 DEGREES
RBC # BLD AUTO: 3.15 X10(6)UL
SODIUM SERPL-SCNC: 144 MMOL/L (ref 136–145)
T AXIS: -2 DEGREES
T AXIS: 28 DEGREES
VENTRICULAR RATE: 101 BPM
VENTRICULAR RATE: 111 BPM
WBC # BLD AUTO: 12.3 X10(3) UL (ref 4–11)

## 2024-04-19 PROCEDURE — 99233 SBSQ HOSP IP/OBS HIGH 50: CPT | Performed by: HOSPITALIST

## 2024-04-19 PROCEDURE — 71045 X-RAY EXAM CHEST 1 VIEW: CPT | Performed by: PHYSICIAN ASSISTANT

## 2024-04-19 RX ORDER — ALBUMIN (HUMAN) 12.5 G/50ML
SOLUTION INTRAVENOUS
Status: DISCONTINUED
Start: 2024-04-19 | End: 2024-04-19 | Stop reason: WASHOUT

## 2024-04-19 RX ORDER — HYDROCODONE BITARTRATE AND ACETAMINOPHEN 5; 325 MG/1; MG/1
2 TABLET ORAL EVERY 4 HOURS PRN
Status: DISCONTINUED | OUTPATIENT
Start: 2024-04-19 | End: 2024-04-22

## 2024-04-19 RX ORDER — HYDROCODONE BITARTRATE AND ACETAMINOPHEN 5; 325 MG/1; MG/1
1 TABLET ORAL EVERY 4 HOURS PRN
Status: DISCONTINUED | OUTPATIENT
Start: 2024-04-19 | End: 2024-04-22

## 2024-04-19 RX ORDER — ALBUMIN, HUMAN INJ 5% 5 %
12.5 SOLUTION INTRAVENOUS ONCE
Status: COMPLETED | OUTPATIENT
Start: 2024-04-19 | End: 2024-04-19

## 2024-04-19 RX ORDER — ALBUMIN, HUMAN INJ 5% 5 %
SOLUTION INTRAVENOUS
Status: DISPENSED
Start: 2024-04-19 | End: 2024-04-20

## 2024-04-19 NOTE — OPERATIVE REPORT
[facility]    PATIENT'S NAME: DORA PRAJAPATI   ATTENDING PHYSICIAN: Gal Oquendo MD   OPERATING PHYSICIAN: Gal Oquendo MD   PATIENT ACCOUNT#:   [id number]    LOCATION:  [address] [place] [address]  MEDICAL RECORD #:   [id number]       YOB: 1965  ADMISSION DATE:       04/18/2024      OPERATION DATE:  04/18/2024    OPERATIVE REPORT    PREOPERATIVE DIAGNOSIS:  Coronary artery disease.   POSTOPERATIVE DIAGNOSIS:  Coronary artery disease.   PROCEDURE:  Coronary revascularization x2:  Left internal mammary artery graft to the left anterior descending, saphenous vein graft to the obtuse marginal coronary artery.    ASSISTANT SURGEON:  Baldemar Becerril MD.    ASSISTANT:  Bala Madera PA-C.    ANESTHESIA:  General endotracheal.    BLOOD LOSS:  600 mL.    COMPLICATIONS:  None.    INDICATIONS:  The patient is a very pleasant middle-aged female patient who has recently undergone complete cardiologic workup including catheterization demonstrating total occlusion of the left anterior descending, which fills via collaterals from the right coronary which itself was not obstructed.  The patient also has significant obtuse marginal artery disease.  Risks and benefits of surgical correction versus other treatment options were discussed.      OPERATIVE TECHNIQUE:  Appropriate lines and catheters were placed.  General anesthesia induced.  [place]-Ann-Marie was placed.  Transesophageal echo was placed.  Transcranial oximetry was placed.  Patient prepped and draped.  Sternotomy was performed.  Left internal mammary was taken down.  Greater saphenous graft was harvested endoscopically and using open technique.  It took 4 incisions in the left leg to be able to procure a single usable segment of vein, which ultimately turned out to be quite a good piece of vein.  The rest of the vein was suboptimal.  The patient was heparinized and cannulated for bypass.  On bypass, patient was cooled to 32 degrees centigrade.  The aorta was  crossclamped.  Blood cardioplegia was infused to achieve electromechanical arrest of the heart.  Obtuse marginal was bypassed first.  This was a 1.5 mm artery of fairly good quality.  Cardioplegia was given, following which the left internal mammary artery was placed to the LAD in the midportion of the LAD.  This was a good quality artery.  Diagonal was a smallish artery.  Rewarming was begun while proximal anastomosis for the obtuse graft was constructed end-to-side to the aorta.  The aorta was unclamped.  Heart was defibrillated to sinus rhythm.  Following complete and thorough rewarming, the patient weaned from bypass without difficulty.  Pump time 68 minutes.  Crossclamp 54 minutes.  Cardioplegia 1.8 L.  Patient was decannulated.  Protamine was administered.  Pacing leads were applied to the heart.  Chest was closed with double-stranded wire.  Patient was taken to the ICU in stable condition.  The patient's family was updated by me regarding the patient's condition and the conduct of the operation.    Dictated By Gal Oquendo MD  d: 04/18/2024 17:03:49  t: 04/19/2024 01:47:04  [facility] [id number]  /

## 2024-04-19 NOTE — PROGRESS NOTES
[facility]   part of [facility]     Hospitalist Progress Note     Hilda Bello Patient Status:  Inpatient    3/6/1965 MRN [id number]   Location [facility] 6NE-A Attending Gal Oquendo MD   Hosp Day # 1 PCP Syd Gomez MD     Chief Complaint: \"I had CABG\"    Subjective:     Patient denies overt cp or sob, no lightheadedness.     Objective:    Review of Systems:   A comprehensive review of systems was completed; pertinent positive and negatives stated in subjective.    Vital signs:  Temp:  [95.4 °F (35.2 °C)-100.3 °F (37.9 °C)] 99.8 °F (37.7 °C)  Pulse:  [] 100  Resp:  [10-25] 14  BP: ()/(45-71) 103/71  SpO2:  [96 %-100 %] 98 %  AO: ()/(42-55) 116/54  FiO2 (%):  [40 %-60 %] 40 %    Physical Exam:    General: No acute distress  Respiratory: No wheezes, no rhonchi  Cardiovascular: S1, S2, regular rate and rhythm  Abdomen: Soft, Non-tender, non-distended, positive bowel sounds  Neuro: No new focal deficits.   Extremities: No edema      Diagnostic Data:    Labs:  Recent Labs   Lab 04/15/24  0911 24  1012 24  1106 24  0413   WBC 4.4  --  21.4* 12.3*   HGB 14.3  --  11.2* 9.7*   MCV 90.4  --  89.4 92.1   .0 178.0 182.0 134.0*   INR 0.98 1.58* 1.39*  --        Recent Labs   Lab 04/15/24  0911 24  1106 24  1410 24  0413   * 163*  --  101*   BUN 12 11  --  8*   CREATSERUM 0.82 0.90  --  0.68   CA 9.7 8.6  --  8.0*   ALB 4.1  --   --   --     142  --  144   K 4.5 2.9* 3.5 3.9    112  --  112   CO2 29.0 26.0  --  28.0   ALKPHO 78  --   --   --    AST 10*  --   --   --    ALT 22  --   --   --    BILT 0.6  --   --   --    TP 8.1  --   --   --        Estimated Creatinine Clearance: 88.8 mL/min (based on SCr of 0.68 mg/dL).    No results for input(s): \"TROP\", \"TROPHS\", \"CK\" in the last 168 hours.    Recent Labs   Lab 04/15/24  0911 24  1012 24  1106   PTP 13.0 18.9* 17.2*   INR 0.98 1.58* 1.39*                   Microbiology    No results found for this visit on 04/18/24.      Imaging: Reviewed in Epic.    Medications:    chlorhexidine gluconate  15 mL Mouth/Throat BID@0800,2000    sertraline  100 mg Oral Daily    ezetimibe  10 mg Oral Nightly    vancomycin  15 mg/kg Intravenous Q12H    metoprolol tartrate  12.5 mg Oral 2x Daily(Beta Blocker)    mupirocin  1 Application Nasal BID    acetaminophen  1,000 mg Intravenous Q6H    aspirin  81 mg Oral Daily    pantoprazole  40 mg Intravenous QAM AC    Or    pantoprazole  40 mg Oral QAM AC    ceFAZolin  2 g Intravenous Q8H       Assessment & Plan:      #Multivessel CAD  -[facility] primary  -s/p scheduled CABGx2 on 4/18  -post op vasopressor support, wean as tolerated  -post op insulin gtt dc'ed, now on ISS, A1c 5.8  -ezetimibe, asa, bb as tolerated    #Transient hypotension  -s/p albumin this AM  -dobutamine gtt on and off, wean as tolerated     #Expected post op resp support  -extubated 4/18 PM  -on 2L NC at this time    #Expected post op anemia  -hgb 9.7, will cont to trend    #4mm left apical pneumothorax  -seen on CXR 4/19, cont supplemental o2    #Thrombocytopenia      #Anxiety  -PTA: sertraline     #HLD         Ale Bansal, DO    Supplementary Documentation:     Quality:  DVT Mechanical Prophylaxis: MARCELLO hose, SCDs,    DVT Pharmacologic Prophylaxis   Medication   None         DVT Pharmacologic prophylaxis: Aspirin 81 mg      Code Status: Full Code  Blackmon: Blackmon catheter in place  Blackmon Duration (in days): 2  Central line:    KAPIL:     Discharge is dependent on: post op recovery  At this point Ms. Bello is expected to be discharge to: tbd    The 21st Century Cures Act makes medical notes like these available to patients in the interest of transparency. Please be advised this is a medical document. Medical documents are intended to carry relevant information, facts as evident, and the clinical opinion of the practitioner. The medical note is intended as peer to peer  communication and may appear blunt or direct. It is written in medical language and may contain abbreviations or verbiage that are unfamiliar.

## 2024-04-19 NOTE — PLAN OF CARE
Pt up ambulating in [place]ark with Pt today. Chest tube Pascale pacer wires and cordis still in. O2 at 2l NC. Norco given for pain in addition to dilaudid pca, rating pain 2/10.   Problem: PAIN - ADULT  Goal: Verbalizes/displays adequate comfort level or patient's stated pain goal  Description: INTERVENTIONS:  - Encourage pt to monitor pain and request assistance  - Assess pain using appropriate pain scale  - Administer analgesics based on type and severity of pain and evaluate response  - Implement non-pharmacological measures as appropriate and evaluate response  - Consider cultural and social influences on pain and pain management  - Manage/alleviate anxiety  - Utilize distraction and/or relaxation techniques  - Monitor for opioid side effects  - Notify MD/LIP if interventions unsuccessful or patient reports new pain  - Anticipate increased pain with activity and pre-medicate as appropriate  Outcome: Progressing

## 2024-04-19 NOTE — PLAN OF CARE
Assumed care at 1930. Hilda is very pleasant. Patient alert & oriented. Dobs weaned to maintain SBP >100. Pain managed with dilaudid PCA. Tolerating clear liquid diet.   0610- up to chair, sustained hypotension in the 70s, dobs titrated and albumin given. BP corrected immediately to high SBP 130s, dobs weaned off. However, HR went ST 130s and sustained. Patient felt SOB during this episode. Cards notified, EKG done, but HR normalized before EKG could capture. Ariela CHU at bedside.   After albumin finished infusing, SBP dropped again to 70s, Dobs restarted.   0659- notified of 4mm left apical pneumothorax. Ariela CHU notified at bedside. Dr. Oquendo also notified.

## 2024-04-19 NOTE — PROGRESS NOTES
[facility]   part of [facility]     CV Surgery Progress Note    Hilda Bello Patient Status:  Inpatient    3/6/1965 MRN [id number]   Location [facility] 6NE-A Attending Gal Oquendo MD   Hosp Day # 1 PCP Syd Gomez MD     Subjective:  Per RN, patient became hypotensive after dobutamine turned off/getting up to chair- improved with restarting. Patient reports feeling ok. Pain is controlled.     Tele: ST    Objective:  BP (!) 84/47 (BP Location: Right arm)   Pulse 107   Temp 99.3 °F (37.4 °C) (Pulmonary Artery)   Resp 19   Ht 5' 7\" (1.702 m)   Wt 184 lb 1.4 oz (83.5 kg)   SpO2 97%   BMI 28.83 kg/m²     Intake/Output:    Intake/Output Summary (Last 24 hours) at 2024 0858  Last data filed at 2024 0726  Gross per 24 hour   Intake 6347.32 ml   Output 2810 ml   Net 3537.32 ml       Labs:  Lab Results   Component Value Date    WBC 12.3 2024    RBC 3.15 2024    HGB 9.7 2024    HCT 29.0 2024    MCV 92.1 2024    MCH 30.8 2024    MCHC 33.4 2024    RDW 13.7 2024    .0 2024     Lab Results   Component Value Date     2024    K 3.9 2024     2024    CO2 28.0 2024    BUN 8 2024    CREATSERUM 0.68 2024     2024    CA 8.0 2024     Lab Results   Component Value Date    INR 1.39 (H) 2024    INR 1.58 (H) 2024    INR 0.98 04/15/2024       Studies:  CXR 24:  4mm left apical ptx    Physical Exam:  General: VSS, A&Ox3, In NAD  Neck: No JVD. [place] in RIJ  Lungs: clear anteriorly   Heart: S1,S2 RRR ; Sternum Stable   Abdomen: Soft, non-tender  Extremities: Warm, dry, trace generalized edema  Skin: sternotomy incision C/D/I, LE with ACE wrap   Neuro: no focal deficits     Assessment/Plan:   CAD s/p CABGx2 with LIMA-LAD, SVG-OM POD#1  -HD compensated on low dose dobutamine, wean keeping SBP >100   -Leukocytosis, low grade temp, likely reactive- monitor    -Acute post op blood loss anemia, expected- monitor   -TCP, likely 2/2 consumption- monitor   -Renal function intact, good UOP  -Bowel regimen   -Pain management, prn norco   -Keep chest tubes for now due to high output  -[place] for accurate I/Os  -Keep PW for now   -GI PPX: protonix  -DVT PPX: TEDs/SCDs  -Encourage IS/ambulation   -PT/OT/Cardiac rehab   -CCU monitoring     -D/W Dr. Oquendo/Jerrica Leblanc PA-C  Cardiothoracic Surgery   4/19/2024  8:58 AM

## 2024-04-19 NOTE — CM/SW NOTE
[facility] is only accepting home health agency in aidin system at this time.  CM to provide HH agency choice/quality information to patient for review and reserve agency if patient agreeable to services.     Update:  Patient agreeable to [facility] for discharge.  CM reserved agency in aidin and placed agency contact information in discharge summary.     SW/CM to remain available for any further discharge planning needs.   Danii Conway RN Case Manager [phone]

## 2024-04-19 NOTE — OCCUPATIONAL THERAPY NOTE
OCCUPATIONAL THERAPY EVALUATION - INPATIENT     Room Number: 6600/6600-A  Evaluation Date: 4/19/2024  Type of Evaluation: Initial  Presenting Problem: s/p CABG x2    Physician Order: IP Consult to Occupational Therapy  Reason for Therapy: ADL/IADL Dysfunction and Discharge Planning    OCCUPATIONAL THERAPY ASSESSMENT   Patient is currently functioning below baseline with toileting, bathing, lower body dressing, bed mobility, transfers, static standing balance, dynamic standing balance, functional standing tolerance, energy conservation strategies, aerobic capacity, and performing household tasks. Prior to admission, patient's baseline is independent without device.  Patient is requiring  CGA/MIN A for mobility, MOD A for LB ADLs  as a result of the following impairments: decreased functional reach, decreased endurance, pain, impaired standing balance, and increased O2 needs from baseline. Occupational Therapy will continue to follow for duration of hospitalization.    Patient will benefit from continued skilled OT Services For duration of hospitalization, however do not anticipate skilled therapy needs at discharge       History Related to Current Admission: Patient is a 59 year old female admitted on 4/18/2024 with from home for scheduled surgery. Pt is s/p CABG x2 with Dr. Oquendo.     Co-Morbidities : MVCAD, HLD, anxiety, depression    WEIGHT BEARING RESTRICTION  Weight Bearing Restriction: None                Recommendations for nursing staff:   Transfers: CGA with RW, 2nd person for line management  Toileting location: toilet     EVALUATION SESSION:  Patient Start of Session: semi-supine   FUNCTIONAL TRANSFER ASSESSMENT  Sit to Stand: Edge of Bed  Edge of Bed: Contact Guard Assist    BED MOBILITY  Supine to Sit : Minimal Assist  Scooting: SBA (inc'd time/effort)    BALANCE ASSESSMENT  Static Sitting: Independent  Static Standing: Contact Guard Assist    FUNCTIONAL ADL ASSESSMENT  LB Dressing Seated: Moderate  Assist      ACTIVITY TOLERANCE: pt denies any dizziness/LH during activity. BP monitored closely with position changes and remains stable, was low this morning when getting up with nursing. Pt sats well on room air with bed mobility, then desats 86-90% with ambulation. Placed back on O2 by RN at end of session,                          O2 SATURATIONS       COGNITION  Overall Cognitive Status:  WFL - within functional limits    Upper Extremity   ROM: within functional limits   Strength: not formally tested d/t precautions but appears within functional limits   Coordination  Gross motor: intact   Fine motor: intact   Sensation: Light touch:  intact    EDUCATION PROVIDED  Patient: Role of Occupational Therapy; Plan of Care; Adaptive Equipment Recommendations; Functional Transfer Techniques; DME Recommendations; Fall Prevention; Surgical Precautions; Posture/Positioning; Energy Conservation; Compensatory ADL Techniques; Proper Body Mechanics  Patient's Response to Education: Verbalized Understanding; Requires Further Education    Equipment used: RW  Demonstrates functional use, Would benefit from additional trial      Therapist comments: Pt is pleasant and motivated to participate in therapy. Initiated education re: CV binder including activity recommendations, log rolling, energy conservation, BUE HEP (to be completed next session), and precautions. Pt w/ good understanding, already implementing pacing strategies during mobility, cueing for pursed lip breathing.     Patient End of Session: Up in chair;With  staff;Needs met;Call light within reach;RN aware of session/findings;All patient questions and concerns addressed;SCDs in place    OCCUPATIONAL PROFILE    HOME SITUATION  Type of Home: House  Home Layout: Two level  Lives With: Spouse;Son    Toilet and Equipment: Standard height toilet  Shower/Tub and Equipment: Walk-in shower  Other Equipment: None    Occupation/Status: healthcare sales     Drives: Yes        Prior Level of Function: Pt lives with her spouse and adult son in 2 level home. She is planning to stay on 2nd floor initially as they have a large dog, who they will keep on first floor. Pt is typically independent with all aspects of mobility and self-cares without device.    SUBJECTIVE   \"My daughter graduates in May. I'll be able to go, right?\"    PAIN ASSESSMENT  Rating:  (did not formally rate)  Location: chest  Management Techniques: Activity promotion;Body mechanics;Breathing techniques;Relaxation;Repositioning    OBJECTIVE  Precautions: Sternal;Cardiac  Fall Risk: Standard fall risk      ASSESSMENTS    AM-PAC ‘6-Clicks’ Inpatient Daily Activity Short Form  -   Putting on and taking off regular lower body clothing?: A Lot  -   Bathing (including washing, rinsing, drying)?: A Little  -   Toileting, which includes using toilet, bedpan or urinal? : A Little  -   Putting on and taking off regular upper body clothing?: A Little  -   Taking care of personal grooming such as brushing teeth?: None  -   Eating meals?: None    AM-PAC Score:  Score: 19  Approx Degree of Impairment: 42.8%  Standardized Score (AM-PAC Scale): 40.22    ADDITIONAL TESTS     NEUROLOGICAL FINDINGS      COGNITION ASSESSMENTS       PLAN  OT Treatment Plan: Balance activities;Energy conservation/work simplification techniques;ADL training;IADL training;Functional transfer training;UE strengthening/ROM;Endurance training;Patient/Family education;Patient/Family training;Equipment eval/education;Compensatory technique education  Rehab Potential : Good  Frequency: 3-5x/week  Number of Visits to Meet Established Goals: 3    ADL Goals   Patient will perform grooming: with stand by assist and while standing at sink  Patient will perform upper body dressing:  with stand by assist  Patient will perform lower body dressing:  with stand by assist and with adaptive equipment PRN  Patient will perform toileting: with stand by assist    Functional  Transfer Goals  Patient will transfer from sit to supine:  with stand by assist  Patient will transfer from supine to sit:  with stand by assist  Patient will transfer from sit to stand:  with stand by assist  Patient will transfer to toilet:  with supervision    UE Exercise Program Goal  Patient will be supervision with bilateral AROM HEP per CV binder (home exercise program).    Additional Goals  Pt will verbalize 3 energy conservation strategies to be implemented into home routine  Pt will verbalize precautions and independently adhere to precautions during ADL tasks    Patient Evaluation Complexity Level:   Occupational Profile/Medical History LOW - Brief history including review of medical or therapy records    Specific performance deficits impacting engagement in ADL/IADL MODERATE  3 - 5 performance deficits   Client Assessment/Performance Deficits MODERATE - Comorbidities and min to mod modifications of tasks    Clinical Decision Making LOW - Analysis of occupational profile, problem-focused assessments, limited treatment options    Overall Complexity LOW     OT Session Time: 25 minutes  Therapeutic Activity: 15 minutes

## 2024-04-19 NOTE — PHYSICAL THERAPY NOTE
PHYSICAL THERAPY EVALUATION - INPATIENT     Room Number: 6600/6600-A  Evaluation Date: 4/19/2024  Type of Evaluation: Initial  Physician Order: PT Eval and Treat    Presenting Problem: s/p  Coronary revascularization x2:  Left internal mammary artery graft to the left anterior descending, saphenous vein graft to the obtuse marginal coronary artery. 4/18  Co-Morbidities : MVCAD, HLD, anxiety, depression  Reason for Therapy: Mobility Dysfunction and Discharge Planning    PHYSICAL THERAPY ASSESSMENT   Patient is currently functioning below baseline with bed mobility, transfers, gait, and stair negotiation.  Prior to admission, patient's baseline is indep.  Patient is requiring contact guard assist as a result of the following impairments: decreased functional strength, decreased endurance/aerobic capacity, and decreased muscular endurance.  Physical Therapy will continue to follow for duration of hospitalization.    Patient will benefit from continued skilled PT Services at discharge to promote functional independence in home.  Anticipate patient will return home with home health PT.    PLAN  PT Treatment Plan: Bed mobility;Body mechanics;Coordination;Endurance;Energy conservation;Patient education;Family education;Gait training;Neuromuscular re-educate;Range of motion;Strengthening;Stoop training;Stair training;Transfer training;Balance training  Rehab Potential : Good  Frequency (Obs): 3-5x/week  Number of Visits to Meet Established Goals: 5      CURRENT GOALS    Goal #1 Patient is able to demonstrate supine - sit EOB @ level: supervision     Goal #2 Patient is able to demonstrate transfers EOB to/from Chair/Wheelchair at assistance level: minimum assistance     Goal #3 Patient is able to ambulate 150 feet with assist device: walker - rolling at assistance level: supervision     Goal #4 Patient is able to navigate stairs with rail and SBA   Goal #5    Goal #6    Goal Comments: Goals established on  4/19/2024      PHYSICAL THERAPY MEDICAL/SOCIAL HISTORY  History related to current admission: Patient is a 59 year old female admitted on 4/18/2024  from home for scheduled surgery. Pt is s/p CABG x2 with Dr. Oquendo.     HOME SITUATION  Type of Home: House   Home Layout: Two level                Lives With: Spouse;Son  Drives: Yes  Patient Owned Equipment: None       Prior Level of [place]: indep, reports worked in  but just quit her job because she thinks it was resulting in increased stress and plans to start her new job after she fully recovers from surgery, no hx of falls in the past 6 months, reports her dtr is away at school and will be going back and forth while she recovers but her son will be consistently around to assist     SUBJECTIVE  \"Yeah my brother had this done a couple years ago\"      OBJECTIVE  Precautions: Sternal;Cardiac  Fall Risk: Standard fall risk    WEIGHT BEARING RESTRICTION  Weight Bearing Restriction: None                PAIN ASSESSMENT  Rating: Unable to rate  Location: sternal  Management Techniques: Activity promotion;Body mechanics;Repositioning    COGNITION  Overall Cognitive Status:  WFL - within functional limits    RANGE OF MOTION AND STRENGTH ASSESSMENT  Upper extremity ROM and strength are within functional limits     Lower extremity ROM is within functional limits     Lower extremity strength is within functional limits       BALANCE  Static Sitting: Good  Dynamic Sitting: Good  Static Standing: Fair -  Dynamic Standing: Fair -    ADDITIONAL TESTS                                    ACTIVITY TOLERANCE                         O2 WALK  Oxygen Therapy  SPO2% on Room Air at Rest: 92  SPO2% Ambulation on Room Air:  (86-91, discussed with RN)    NEUROLOGICAL FINDINGS                        AM-PAC '6-Clicks' INPATIENT SHORT FORM - BASIC MOBILITY  How much difficulty does the patient currently have...  Patient Difficulty: Turning over in bed (including adjusting  bedclothes, sheets and blankets)?: None   Patient Difficulty: Sitting down on and standing up from a chair with arms (e.g., wheelchair, bedside commode, etc.): A Little   Patient Difficulty: Moving from lying on back to sitting on the side of the bed?: A Little   How much help from another person does the patient currently need...   Help from Another: Moving to and from a bed to a chair (including a wheelchair)?: A Little   Help from Another: Need to walk in hospital room?: A Little   Help from Another: Climbing 3-5 steps with a railing?: A Lot       AM-PAC Score:  Raw Score: 18   Approx Degree of Impairment: 46.58%   Standardized Score (AM-PAC Scale): 43.63   CMS Modifier (G-Code): CK    FUNCTIONAL ABILITY STATUS  Gait Assessment   Functional Mobility/Gait Assessment  Gait Assistance: Contact guard assist  Distance (ft): 50  Assistive Device: Rolling walker  Pattern: Within Functional Limits    Skilled Therapy Provided     Bed Mobility:  Rolling: CGA  Supine to sit: min A       Transfer Mobility:  Sit to stand: CGA   Stand to sit: CGA  Gait = CGA    Therapist's Comments: educated on sternal precautions with cuing for log roll technique, assist provided from sidelying to sitting, denied dizziness, BP assessed and noted 117/57 sitting EOB, O2 sats low 90s. VC for UE placement during STS transfer to maintain precautions, able to participate in gait training with RW with cuing for EC and pacing technique, sats intermittently 86-91% on RA, RN notified, cued for standing rest breaks thorughout with pursed lip breathing for sats to recover.     Exercise/Education Provided:  Bed mobility  Body mechanics  Energy conservation  Functional activity tolerated  Gait training  Transfer training    Patient End of Session: Up in chair;Needs met;Call light within reach;With  staff;RN aware of session/findings;All patient questions and concerns addressed      Patient Evaluation Complexity Level:  History Moderate - 1 or 2 personal  factors and/or co-morbidities   Examination of body systems Moderate - addressing a total of 3 or more elements   Clinical Presentation Moderate - Evolving   Clinical Decision Making Moderate - Evolving       PT Session Time: 30 minutes  Gait Training: 10 minutes  Therapeutic Activity: 5 minutes  Neuromuscular Re-education:  minutes  Therapeutic Exercise:  minutes

## 2024-04-19 NOTE — PROGRESS NOTES
Progress Note  Hilda Bello Patient Status:  Inpatient    3/6/1965 MRN [id number]   Location [facility] 6NE-A Attending Gal Oquendo MD   Hosp Day # 1 PCP Syd Gomez MD     Subjective:  Seen this am after up to chair. Experienced symptomatic, transient hypotension. Improved with albumin and resumption of dobutamine. Did feel mildly sob when HR increased during this time. Currently no acute distrss.     Objective:  BP (!) 84/47 (BP Location: Right arm)   Pulse 107   Temp 99.3 °F (37.4 °C) (Pulmonary Artery)   Resp 19   Ht 5' 7\" (1.702 m)   Wt 184 lb 1.4 oz (83.5 kg)   SpO2 97%   BMI 28.83 kg/m²     Telemetry: sinus tach      Intake/Output:    Intake/Output Summary (Last 24 hours) at 2024 0851  Last data filed at 2024 0726  Gross per 24 hour   Intake 6347.32 ml   Output 2810 ml   Net 3537.32 ml       Last 3 Weights   24 0500 184 lb 1.4 oz (83.5 kg)   24 0606 172 lb 11.2 oz (78.3 kg)   04/15/24 1405 176 lb 4 oz (79.9 kg)   24 0935 176 lb (79.8 kg)       Labs:  Recent Labs   Lab 04/15/24  0911 24  1106 24  1410 24  0413   * 163*  --  101*   BUN 12 11  --  8*   CREATSERUM 0.82 0.90  --  0.68   EGFRCR 83 75  --  102   CA 9.7 8.6  --  8.0*    142  --  144   K 4.5 2.9* 3.5 3.9    112  --  112   CO2 29.0 26.0  --  28.0     Recent Labs   Lab 04/15/24  0911 24  1012 24  1106 24  0413   RBC 4.69  --  3.59* 3.15*   HGB 14.3  --  11.2* 9.7*   HCT 42.4  --  32.1* 29.0*   MCV 90.4  --  89.4 92.1   MCH 30.5  --  31.2 30.8   MCHC 33.7  --  34.9 33.4   RDW 13.2  --  13.1 13.7   NEPRELIM 2.66  --   --  9.75*   WBC 4.4  --  21.4* 12.3*   .0 178.0 182.0 134.0*         No results for input(s): \"TROP\", \"TROPHS\", \"CK\" in the last 168 hours.    Review of Systems   Cardiovascular:  Positive for dyspnea on exertion and leg swelling.   Respiratory: Negative.         Physical Exam:    Gen: alert, oriented x 3, NAD  Heent: pupils  equal, reactive. Mucous membranes moist.   Neck: no jvd. Right neck central line.  Cardiac: regular rate and rhythm, normal S1,S2, tachy, soft rub. CT wall suction, ss output  Lungs: CTA/dim, on 2L NC  Abd: soft, NT/ND +bs  Ext: generalized edema  Skin: Warm, dry. Sternum cdi.   Neuro: No focal deficits        Medications:     insulin aspart  1-10 Units Subcutaneous TID AC and HS    chlorhexidine gluconate  15 mL Mouth/Throat BID@0800,2000    sertraline  100 mg Oral Daily    ezetimibe  10 mg Oral Nightly    vancomycin  15 mg/kg Intravenous Q12H    metoprolol tartrate  12.5 mg Oral 2x Daily(Beta Blocker)    mupirocin  1 Application Nasal BID    acetaminophen  1,000 mg Intravenous Q6H    aspirin  81 mg Oral Daily    pantoprazole  40 mg Intravenous QAM AC    Or    pantoprazole  40 mg Oral QAM AC    ceFAZolin  2 g Intravenous Q8H      propofol Stopped (04/18/24 1157)    dexmedetomidine Stopped (04/18/24 1423)    sodium chloride 10 mL/hr at 04/18/24 1100    DOBUTamine 3 mcg/kg/min (04/19/24 0702)    nitroGLYCERIN in dextrose 5%      norepinephrine      NitroPRUSSide (Nipride) 50 mg in dextrose 5% 250 mL infusion      dextrose 5%-sodium chloride 0.45% 30 mL/hr (04/19/24 0634)    sodium chloride 10 mL/hr at 04/18/24 1107    HYDROmorphone in sodium chloride 0.9%      insulin regular 1 Units/hr (04/18/24 2106)    EPINEPHrine (Adrenalin) 5 mg in sodium chloride 0.9% 250 mL infusion Stopped (04/18/24 1610)           Assessment:  Pod 1 s/p CABG x2v (lima-lad, svg-om) 4/18/24  Preserved LVEF 65%  Cxr this am with 4mm left apical ptx--per surgery  Hypotension this am after up to chair and dobs weaned  Expected post op anemia--hgb 9.7 this am. Follow   Leukocytosis--improving, likely reactive, afebrile  Vol OL-+3.7 L  Sinus tachycardia--bb once off dobs  HL-, repatha, zetia, intolerant to statins    Plan:  Hypotension this am after getting up to chair and dobs weaned. Improved with albumin and resumption of dobs. Can slowly  try to wean again if able. Cvp was 7 prior to albumin.   Bb once able.   Cont asa, zetia  Encouraged IS  CT per CV, backup pacer in-not requiring.   Mild vol OL--will eventually need diuresis. Holding off with marginal bp.   EKG this am without acute ischemic changes, sinus tach    Plan of care discussed with patient, RN, CV surgery PA.     Ariela Sanchez, APRN  4/19/2024  8:51 AM  [phone]  [phone]        I agree with above note and plan. I seen and examined the patient. The chart was reviewed and case was d/w rounding APN. I made clinical decisions independently.    The patient recovering from 2-vessel CABG yesterday.  Mild to moderate incisional discomfort but no angina.  Hemodynamically stable.  Dobutamine was weaned this morning.  Transient hypotension upon ambulation this morning.  Albumin bolus was given a low-dose of dobutamine was resumed.  Mild.  Hypovolemia by CVP of 7 this morning, hypovolemia intravascularly contributed to orthostatic symptoms this morning.    Patient underwent elective CABG for abnormal stress test and subsequent severe coronary artery disease found on outpatient angiogram on April 11, 2024.  LVEF 60% by LV gram.  LHC revealed two-vessel severe CAD especially LAD artery with right to left collaterals.  No significant disease in ectatic RCA.    Stable sinus in telemetry with intermittent sinus tachycardia but no prognostically significant arrhythmia.  No A-fib.    Physical Exam: Blood pressure 105 over 60 mmHg.  Heart rate 1 1 bpm.    Constitutional: Normal appearance. No apparent distress.  Head and Neck: No JVD  Cardiovascular: Regular rate and rhythm, S1, S2 normal, no murmur, rub or gallop.  Respiratory: Clear lungs without wheezes, rales, rhonchi or dullness.  Normal excursions and effort.  Gastrointestinal: Soft, non-tender abdomen.   Extremities: Without clubbing, cyanosis or edema.  Peripheral pulses are 2+.  Neurologic: Alert and oriented x3.  Cranial  nerves intact. Normal sensation and motor function. No focal deficits.  Musculoskeletal: normal range of motion, normal muscle strength, no joint effusion.   Integumentary: Clean sternotomy wound post CABG.  Psychiatric: no depression. Normal affect.     Laboratory data: Potassium 3.9 sodium 144 glucose 101 INR 1.39 and hemoglobin 9.7 with platelet count 134.    EKG -sinus tachycardia 111 bpm with no ischemia but nonspecific ST-T changes across EKG.  Narrow QRS and no pathological Q waves.  Normal intervals.    I reviewed chest x-ray images myself: No impressive congestion or pleural effusion.  Atelectasis.  Tiny left apical pneumothorax.    Assessment: Status post two-vessel CABG day #1 with LIMA to LAD and SVG to OM with mild hypovolemia after surgery treated with IV albumin and slow dobutamine weaning.  Postop anemia and thrombocytopenia with no bleeding issues.  CVP of 7 more consistent with mild hypovolemia intravascularly.  Hyperlipidemia.  Hemodynamically stable now. Non-obstructive carotid disease. Depression and anxiety.    Plan:  -CCU monitoring  -Correct intravascular hypovolemia with CVP of 7 this morning responsible for orthostatic movement while ambulating  -Aspirin and Zetia with statin intolerance  -Repatha can be given outpatient and schedule  -Ambulate with following precautions after volume correction  -start very low-dose beta-blocker when off dobutamine  - no lasix  - since dobs still at 5mcg - and CVP 4 to 7 - give additional 250cc albumin bolus to help with oncotic pressure - it will help to start weaning inotrope - still dry in the vessel  -Follow-up chest x-ray and metabolic panel with CBC  -pain control with transition from PCA to oral Norco PRN roman today    Discussed with the patient, her  and CCU Nurse    Tj Acosta M.D.  [facility]    4/19/2024  2:05 PM  F/u3

## 2024-04-20 ENCOUNTER — APPOINTMENT (OUTPATIENT)
Dept: GENERAL RADIOLOGY | Facility: HOSPITAL | Age: 59
End: 2024-04-20
Attending: THORACIC SURGERY (CARDIOTHORACIC VASCULAR SURGERY)

## 2024-04-20 ENCOUNTER — APPOINTMENT (OUTPATIENT)
Dept: GENERAL RADIOLOGY | Facility: HOSPITAL | Age: 59
DRG: 236 | End: 2024-04-20
Attending: THORACIC SURGERY (CARDIOTHORACIC VASCULAR SURGERY)

## 2024-04-20 LAB
ANION GAP SERPL CALC-SCNC: 4 MMOL/L (ref 0–18)
BUN BLD-MCNC: 8 MG/DL (ref 9–23)
CALCIUM BLD-MCNC: 8.4 MG/DL (ref 8.5–10.1)
CHLORIDE SERPL-SCNC: 104 MMOL/L (ref 98–112)
CO2 SERPL-SCNC: 28 MMOL/L (ref 21–32)
CREAT BLD-MCNC: 0.67 MG/DL
EGFRCR SERPLBLD CKD-EPI 2021: 102 ML/MIN/1.73M2 (ref 60–?)
ERYTHROCYTE [DISTWIDTH] IN BLOOD BY AUTOMATED COUNT: 14 %
GLUCOSE BLD-MCNC: 122 MG/DL (ref 70–99)
GLUCOSE BLD-MCNC: 123 MG/DL (ref 70–99)
GLUCOSE BLD-MCNC: 140 MG/DL (ref 70–99)
GLUCOSE BLD-MCNC: 182 MG/DL (ref 70–99)
GLUCOSE BLD-MCNC: 197 MG/DL (ref 70–99)
HCT VFR BLD AUTO: 26.8 %
HGB BLD-MCNC: 8.7 G/DL
MCH RBC QN AUTO: 30.6 PG (ref 26–34)
MCHC RBC AUTO-ENTMCNC: 32.5 G/DL (ref 31–37)
MCV RBC AUTO: 94.4 FL
OSMOLALITY SERPL CALC.SUM OF ELEC: 282 MOSM/KG (ref 275–295)
PLATELET # BLD AUTO: 123 10(3)UL (ref 150–450)
PLATELETS.RETICULATED NFR BLD AUTO: 5.8 % (ref 0–7)
POTASSIUM SERPL-SCNC: 4.4 MMOL/L (ref 3.5–5.1)
RBC # BLD AUTO: 2.84 X10(6)UL
SODIUM SERPL-SCNC: 136 MMOL/L (ref 136–145)
WBC # BLD AUTO: 10.9 X10(3) UL (ref 4–11)

## 2024-04-20 PROCEDURE — 99233 SBSQ HOSP IP/OBS HIGH 50: CPT | Performed by: HOSPITALIST

## 2024-04-20 PROCEDURE — 71045 X-RAY EXAM CHEST 1 VIEW: CPT | Performed by: THORACIC SURGERY (CARDIOTHORACIC VASCULAR SURGERY)

## 2024-04-20 RX ORDER — ALPRAZOLAM 0.25 MG/1
0.25 TABLET ORAL EVERY 6 HOURS PRN
Status: DISCONTINUED | OUTPATIENT
Start: 2024-04-20 | End: 2024-04-22

## 2024-04-20 NOTE — PLAN OF CARE
Assumed care of patient at 0730. Calm, cooperative, A&Ox4. On 3LNC sitting in chair in no acute distress. Usual lines minus Liberal-Ann-Marie present. PT/OT at 10AM, walked in clark, then back to bed for de-lining. Chest tubes removed without incident, however Dr Oquendo aware of R apical pneumo up to 2cm. Hemodynamically stable, O2 requirements unchanged, no shortness of breath. Pt c/o L flank pain 5/10, described as a dull ache, not relieved with repositioning or heat. No CVA tenderness. UOP 165cc in 7h. Dr Oquendo aware, no new orders - monitoring. Blackmon removed @ 1600. Flank pain improving per pt. Ambulated in clark w/ contact-guard assist, gait belt and rolling walker. Back to chair for dinner.  at dinner, but pt just drank orange juice and has not required insulin, nor does she have a history of diabetes. Insulin held, will endorse a recheck for tonight. Discussed pain management plan with pt, goal is to remove PCA and only use PO analgesic (Norco/Tylenol). Xanax PRN on board for tonight for anxiety. Pt in agreement with plan. Will endorse this information to NOC shift RN.

## 2024-04-20 NOTE — OCCUPATIONAL THERAPY NOTE
OCCUPATIONAL THERAPY TREATMENT NOTE - INPATIENT     Room Number: 6600/6600-A  Session: 1   Number of Visits to Meet Established Goals: 3    Presenting Problem: s/p CABG x2    Prior Level of Function: Pt lives with her spouse and adult son in 2 level home. She is planning to stay on 2nd floor initially as they have a large dog, who they will keep on first floor. Pt is typically independent with all aspects of mobility and self-cares without device.     ASSESSMENT   Patient demonstrates good  progress this session, goals updated to reflect patient performance.    Patient continues to function below baseline with bed mobility, transfers, functional standing tolerance, and aerobic capacity.   Contributing factors to remaining limitations include decreased endurance and pain.  Next session anticipate patient to progress upper body dressing, lower body dressing, bed mobility, transfers, functional standing tolerance, and energy conservation strategies.  Occupational Therapy will continue to follow patient for duration of hospitalization.    Patient continues to benefit from continued skilled OT services: For duration of hospitalization, however, given the patient is functioning near baseline level do not anticipate skilled therapy needs at discharge .          OT Device Recommendations: TBD    History:  Patient is a 59 year old female admitted on 4/18/2024 with from home for scheduled surgery. Pt is s/p CABG x2 with Dr. Oquendo.      Co-Morbidities : MVCAD, HLD, anxiety, depression    WEIGHT BEARING RESTRICTION  Weight Bearing Restriction: None                Recommendations for nursing staff:   Transfers: one person with RW  Toileting location: toilet    TREATMENT SESSION:  Patient Start of Session: seated in chair  FUNCTIONAL TRANSFER ASSESSMENT  Sit to Stand: Chair  Edge of Bed: Contact Guard Assist  Chair: Contact Guard Assist    BED MOBILITY  Supine to Sit : Not tested  Sit to Supine (OT): Minimal Assist (for  LE)  Scooting: Not tested    BALANCE ASSESSMENT  Static Sitting: Independent  Sitting Unilateral: Independent  Static Standing: Contact Guard Assist  Standing Unilateral: Contact Guard Assist    FUNCTIONAL ADL ASSESSMENT  LB Dressing Seated: Moderate Assist      ACTIVITY TOLERANCE: functional mobility approx 1 minute  x5 trials on room air with SPO2 88-94% and use of short standing rest breaks for recovery.                          O2 SATURATIONS  Oxygen Therapy  SPO2% on Room Air at Rest: 92  SPO2% on Oxygen at Rest: 95  At rest oxygen flow (liters per minute): 3  SPO2% Ambulation on Room Air: 94    EDUCATION PROVIDED  Patient: Role of Occupational Therapy; Plan of Care; Adaptive Equipment Recommendations; Functional Transfer Techniques; DME Recommendations; Fall Prevention; Surgical Precautions; Posture/Positioning; Energy Conservation; Compensatory ADL Techniques; Proper Body Mechanics  Patient's Response to Education: Verbalized Understanding; Requires Further Education      Equipment used: RW  Demonstrates functional use, Would benefit from additional trial     UPPER EXTREMITY  ROM: within functional limits   Strength: is within functional limits   Coordination  Gross motor: WFL  Fine motor: WFL  Sensation: Light touch:  intact    Therapist comments: Pt is pleasant and cooperative throughout session. Pt reported some fatigue in general. Pt demonstrated good return demonstration of pursed lip breathing (PLB) and required min verbal cues for initiating rest breaks and PLB during mobility.   Patient End of Session: Up in chair;With  staff;Needs met;Call light within reach;RN aware of session/findings;All patient questions and concerns addressed;SCDs in place    SUBJECTIVE  \"Can I go for a walk?\"    PAIN ASSESSMENT  Rating: Unable to rate  Location: discomfort in chest  Management Techniques: Body mechanics;Breathing techniques;Repositioning     OBJECTIVE  Precautions: Sternal;Cardiac    AM-PAC ‘6-Clicks’  Inpatient Daily Activity Short Form  -   Putting on and taking off regular lower body clothing?: A Lot  -   Bathing (including washing, rinsing, drying)?: A Little  -   Toileting, which includes using toilet, bedpan or urinal? : A Little  -   Putting on and taking off regular upper body clothing?: A Little  -   Taking care of personal grooming such as brushing teeth?: None  -   Eating meals?: None    AM-PAC Score:  Score: 19  Approx Degree of Impairment: 42.8%  Standardized Score (AM-PAC Scale): 40.22    PLAN  OT Treatment Plan: Balance activities;Energy conservation/work simplification techniques;ADL training;IADL training;Functional transfer training;UE strengthening/ROM;Endurance training;Patient/Family education;Patient/Family training;Equipment eval/education;Compensatory technique education  Rehab Potential : Good  Frequency: 3-5x/week    OT Goals:   ADL Goals - ongoing 4/20/24  Patient will perform grooming: with stand by assist and while standing at sink  Patient will perform upper body dressing:  with stand by assist  Patient will perform lower body dressing:  with stand by assist and with adaptive equipment PRN  Patient will perform toileting: with stand by assist     Functional Transfer Goals - progressing 4/24  Patient will transfer from sit to supine:  with stand by assist  Patient will transfer from supine to sit:  with stand by assist  Patient will transfer from sit to stand:  with stand by assist  Patient will transfer to toilet:  with supervision     UE Exercise Program Goal - goal met 4/24  Patient will be supervision with bilateral AROM HEP per CV binder (home exercise program).     Additional Goals - ongoing 4/24  Pt will verbalize 3 energy conservation strategies to be implemented into home routine  Pt will verbalize precautions and independently adhere to precautions during ADL tasks    OT Session Time: 24 minutes  Therapeutic Activity: 14 minutes  Therapeutic Exercise: 10 minutes

## 2024-04-20 NOTE — PROGRESS NOTES
[facility]   [facility] Progress Note    Hilda Bello Patient Status:  Inpatient    3/6/1965 MRN [id number]   Location [facility] 6NE-A Attending Gal Oquendo MD   Hosp Day # 2 PCP Syd Gomez MD     Subjective:  Sitting up in chair this am . Mild pain . Requesting xanax for anxiety and to help with sleep . Nausea better     Objective:  /74   Pulse 82   Temp 98.5 °F (36.9 °C) (Temporal)   Resp 19   Ht 170.2 cm (5' 7\")   Wt 85 kg (187 lb 6.4 oz)   SpO2 98%   BMI 29.35 kg/m²   PAP: ()/()       Intake/Output:    Intake/Output Summary (Last 24 hours) at 2024 0747  Last data filed at 2024 0735  Gross per 24 hour   Intake 2768 ml   Output 1410 ml   Net 1358 ml       Chest Tube Output: 130  No air leak     Last 3 Weights   24 0600 85 kg (187 lb 6.4 oz)   24 0500 83.5 kg (184 lb 1.4 oz)   24 0606 78.3 kg (172 lb 11.2 oz)   04/15/24 1405 79.9 kg (176 lb 4 oz)   24 0935 79.8 kg (176 lb)           Allergies:  Allergies   Allergen Reactions    Statins MYALGIA       Current Facility-Administered Medications   Medication Dose Route Frequency    insulin aspart (NovoLOG) 100 Units/mL FlexPen 1-10 Units  1-10 Units Subcutaneous TID AC and HS    HYDROcodone-acetaminophen (Norco) 5-325 MG per tab 1 tablet  1 tablet Oral Q4H PRN    Or    HYDROcodone-acetaminophen (Norco) 5-325 MG per tab 2 tablet  2 tablet Oral Q4H PRN    ipratropium-albuterol (Duoneb) 0.5-2.5 (3) MG/3ML inhalation solution 3 mL  3 mL Nebulization Q4H PRN    sertraline (Zoloft) tab 100 mg  100 mg Oral Daily    ezetimibe (Zetia) tab 10 mg  10 mg Oral Nightly    sodium chloride 0.9% infusion   Intravenous Continuous    morphINE PF 2 MG/ML injection 2 mg  2 mg Intravenous Q2H PRN    Or    morphINE PF 4 MG/ML injection 4 mg  4 mg Intravenous Q2H PRN    melatonin tab 3 mg  3 mg Oral Nightly PRN    polyethylene glycol (PEG 3350) (Miralax) 17 g oral packet 17 g  17 g Oral Daily PRN    sennosides  (Senokot) tab 17.2 mg  17.2 mg Oral Nightly PRN    bisacodyl (Dulcolax) 10 MG rectal suppository 10 mg  10 mg Rectal Daily PRN    fleet enema (Fleet) 7-19 GM/118ML rectal enema 133 mL  1 enema Rectal Once PRN    ondansetron (Zofran) 4 MG/2ML injection 4 mg  4 mg Intravenous Q6H PRN    DOBUTamine in dextrose 5% (Dobutrex) 500 mg/250mL infusion premix  2.5-20 mcg/kg/min (Dosing Weight) Intravenous Continuous PRN    nitroGLYCERIN in dextrose 5% 50 mg/250mL infusion premix  5-300 mcg/min Intravenous Continuous PRN    norepinephrine (Levophed) 4 mg/250mL infusion premix  0.5-30 mcg/min Intravenous Continuous PRN    NitroPRUSSide (Nipride) 50 mg in dextrose 5% 250 mL infusion  0.1-4 mcg/kg/min (Dosing Weight) Intravenous Continuous PRN    metoprolol tartrate (Lopressor) partial tab 12.5 mg  12.5 mg Oral 2x Daily(Beta Blocker)    potassium chloride 20 mEq/100mL IVPB premix 20 mEq  20 mEq Intravenous PRN    Or    potassium chloride 40 mEq/100mL IVPB premix (central line) 40 mEq  40 mEq Intravenous PRN    calcium gluconate 3 g in sodium chloride 0.9% 100 mL IVPB  3 g Intravenous PRN    magnesium sulfate in dextrose 5% 1 g/100mL infusion premix 1 g  1 g Intravenous PRN    magnesium sulfate in sterile water for injection 2 g/50mL IVPB premix 2 g  2 g Intravenous PRN    mupirocin (Bactroban) 2% nasal ointment 1 Application  1 Application Nasal BID    aspirin DR tab 81 mg  81 mg Oral Daily    pantoprazole (Protonix) 40 mg in sodium chloride 0.9% PF 10 mL IV push  40 mg Intravenous QAM AC    Or    pantoprazole (Protonix) DR tab 40 mg  40 mg Oral QAM AC    sodium chloride 0.9% infusion   Intravenous Continuous    HYDROmorphone in sodium chloride 0.9% (Dilaudid) 20 mg/100mL PCA premix   Intravenous Continuous    naloxone (Narcan) 0.4 MG/ML injection 0.08 mg  0.08 mg Intravenous Q5 Min PRN    diphenhydrAMINE (Benadryl) 50 mg/mL  injection 12.5 mg  12.5 mg Intravenous Q4H PRN    glucose (Dex4) 15 GM/59ML oral liquid 15 g  15 g Oral  Q15 Min PRN    Or    glucose (Glutose) 40% oral gel 15 g  15 g Oral Q15 Min PRN    Or    glucose-vitamin C (Dex-4) chewable tab 4 tablet  4 tablet Oral Q15 Min PRN    Or    dextrose 50% injection 50 mL  50 mL Intravenous Q15 Min PRN    Or    glucose (Dex4) 15 GM/59ML oral liquid 30 g  30 g Oral Q15 Min PRN    Or    glucose (Glutose) 40% oral gel 30 g  30 g Oral Q15 Min PRN    Or    glucose-vitamin C (Dex-4) chewable tab 8 tablet  8 tablet Oral Q15 Min PRN    EPINEPHrine (Adrenalin) 5 mg in sodium chloride 0.9% 250 mL infusion  1-10 mcg/min Intravenous Continuous       Labs:  Lab Results   Component Value Date    WBC 10.9 04/20/2024    HGB 8.7 04/20/2024    HCT 26.8 04/20/2024    .0 04/20/2024    CREATSERUM 0.67 04/20/2024    BUN 8 04/20/2024     04/20/2024    K 4.4 04/20/2024     04/20/2024    CO2 28.0 04/20/2024     04/20/2024    CA 8.4 04/20/2024           Physical Exam:    Neuro: NAD intact A/O X4   Lungs: Clear no wheezing no sob wean nasal cannula using IS   Heart: RRR S1 S2 sternum stable   Abdomen: Soft non tender currently no nausea bs present   Extremities:Warm and dry trace edema   Pulses: palpable   Incisions: Sternotomy C/D/I LE incision C/D/I        Assessment/Plan:  Patient Active Problem List   Diagnosis    Mixed hyperlipidemia    Iron deficiency anemia due to chronic blood loss    Dysfunctional uterine bleeding    Perimenopausal menorrhagia    Dense breast tissue    Anxiety and depression    Chronic migraine    Atypical ductal hyperplasia of left breast    Special screening for malignant neoplasm of colon    Benign neoplasm of cecum    Colon polyps    Diverticulosis of colon    Internal hemorrhoids    Coronary artery disease involving native coronary artery of native heart without angina pectoris       POD# 2 S/P CABG x2 with LIMA - LAD , SVG - OM     - HD stable off dobs   - Remove cordis   - Leukocytosis resolved no fevers monitor   - Acute blood loss expected post op  anemia - monitor   - TCP , likely 2/2 consumption- trending up   - Renal function intact making urine   - Remove chest tubes / xray post removal   - Discontinue turner   - Bowel regimen   - Hx of anxiety home meds restarted   - Pain management Norco  - PW in keep  - Encourage IS/ Ambulation   - GI PPX protonix   - DVT PPX Damon's / SCD'S   - PT/OT/ Cardiac rehab   - Keep in CCU   - Discharge planning       D/W Dr.Foy Laureen PEREZ, RN  4/20/2024  7:47 AM

## 2024-04-20 NOTE — PROGRESS NOTES
[facility] Cardiology Progress Note    Patient seen and examined.  Chart reviewed.  Discussed with RN    No chest pain or shortness of breath.    /74   Pulse 82   Temp 98.5 °F (36.9 °C) (Temporal)   Resp 19   Ht 67\"   Wt 187 lb 6.4 oz   SpO2 98%   BMI 29.35 kg/m²   Gen: alert and oriented  HEENT: moist mucous membranes  Neck: No JVD  CV: regular, s1, s2  Lungs: CTAB anteriorly  Ext: no edema  Skin: warm and dry  Tele: sinus mechanism      Intake/Output Summary (Last 24 hours) at 4/20/2024 0717  Last data filed at 4/20/2024 0600  Gross per 24 hour   Intake 2773 ml   Output 1410 ml   Net 1363 ml       Lab Results   Component Value Date    WBC 10.9 04/20/2024    HGB 8.7 04/20/2024    HCT 26.8 04/20/2024    .0 04/20/2024      insulin aspart (NovoLOG) 100 Units/mL FlexPen 1-10 Units  1-10 Units Subcutaneous TID AC and HS    HYDROcodone-acetaminophen (Norco) 5-325 MG per tab 1 tablet  1 tablet Oral Q4H PRN    Or    HYDROcodone-acetaminophen (Norco) 5-325 MG per tab 2 tablet  2 tablet Oral Q4H PRN    [COMPLETED] albumin human (Albumin) 5% injection 12.5 g  12.5 g Intravenous Once    [COMPLETED] mupirocin (Bactroban) 2% nasal ointment 1 Application  1 Application Nasal Once    [COMPLETED] ceFAZolin (Ancef) 2 g in 20mL IV syringe premix  2 g Intravenous On Call    [COMPLETED] metoprolol tartrate (Lopressor) tab 25 mg  25 mg Oral 2x Daily(Beta Blocker)    ipratropium-albuterol (Duoneb) 0.5-2.5 (3) MG/3ML inhalation solution 3 mL  3 mL Nebulization Q4H PRN    sertraline (Zoloft) tab 100 mg  100 mg Oral Daily    ezetimibe (Zetia) tab 10 mg  10 mg Oral Nightly    sodium chloride 0.9% infusion   Intravenous Continuous    morphINE PF 2 MG/ML injection 2 mg  2 mg Intravenous Q2H PRN    Or    morphINE PF 4 MG/ML injection 4 mg  4 mg Intravenous Q2H PRN    melatonin tab 3 mg  3 mg Oral Nightly PRN    polyethylene glycol (PEG 3350) (Miralax) 17 g oral packet 17 g  17 g Oral Daily PRN    sennosides (Senokot) tab 17.2 mg   17.2 mg Oral Nightly PRN    bisacodyl (Dulcolax) 10 MG rectal suppository 10 mg  10 mg Rectal Daily PRN    fleet enema (Fleet) 7-19 GM/118ML rectal enema 133 mL  1 enema Rectal Once PRN    ondansetron (Zofran) 4 MG/2ML injection 4 mg  4 mg Intravenous Q6H PRN    [COMPLETED] vancomycin (Vancocin) 1.25 g in sodium chloride 0.9% 250mL IVPB premix  15 mg/kg Intravenous Q12H    [] albumin human (Albumin) 5% injection 12.5 g  12.5 g Intravenous Once PRN    DOBUTamine in dextrose 5% (Dobutrex) 500 mg/250mL infusion premix  2.5-20 mcg/kg/min (Dosing Weight) Intravenous Continuous PRN    nitroGLYCERIN in dextrose 5% 50 mg/250mL infusion premix  5-300 mcg/min Intravenous Continuous PRN    norepinephrine (Levophed) 4 mg/250mL infusion premix  0.5-30 mcg/min Intravenous Continuous PRN    NitroPRUSSide (Nipride) 50 mg in dextrose 5% 250 mL infusion  0.1-4 mcg/kg/min (Dosing Weight) Intravenous Continuous PRN    metoprolol tartrate (Lopressor) partial tab 12.5 mg  12.5 mg Oral 2x Daily(Beta Blocker)    potassium chloride 20 mEq/100mL IVPB premix 20 mEq  20 mEq Intravenous PRN    Or    potassium chloride 40 mEq/100mL IVPB premix (central line) 40 mEq  40 mEq Intravenous PRN    calcium gluconate 3 g in sodium chloride 0.9% 100 mL IVPB  3 g Intravenous PRN    magnesium sulfate in dextrose 5% 1 g/100mL infusion premix 1 g  1 g Intravenous PRN    magnesium sulfate in sterile water for injection 2 g/50mL IVPB premix 2 g  2 g Intravenous PRN    mupirocin (Bactroban) 2% nasal ointment 1 Application  1 Application Nasal BID    [] acetaminophen (Ofirmev) 10 mg/mL infusion premix 1,000 mg  1,000 mg Intravenous Q6H    aspirin DR tab 81 mg  81 mg Oral Daily    pantoprazole (Protonix) 40 mg in sodium chloride 0.9% PF 10 mL IV push  40 mg Intravenous QAM AC    Or    pantoprazole (Protonix) DR tab 40 mg  40 mg Oral QAM AC    [COMPLETED] ceFAZolin (Ancef) 2 g in 20mL IV syringe premix  2 g Intravenous Q8H    sodium chloride 0.9%  infusion   Intravenous Continuous    HYDROmorphone in sodium chloride 0.9% (Dilaudid) 20 mg/100mL PCA premix   Intravenous Continuous    naloxone (Narcan) 0.4 MG/ML injection 0.08 mg  0.08 mg Intravenous Q5 Min PRN    diphenhydrAMINE (Benadryl) 50 mg/mL  injection 12.5 mg  12.5 mg Intravenous Q4H PRN    glucose (Dex4) 15 GM/59ML oral liquid 15 g  15 g Oral Q15 Min PRN    Or    glucose (Glutose) 40% oral gel 15 g  15 g Oral Q15 Min PRN    Or    glucose-vitamin C (Dex-4) chewable tab 4 tablet  4 tablet Oral Q15 Min PRN    Or    dextrose 50% injection 50 mL  50 mL Intravenous Q15 Min PRN    Or    glucose (Dex4) 15 GM/59ML oral liquid 30 g  30 g Oral Q15 Min PRN    Or    glucose (Glutose) 40% oral gel 30 g  30 g Oral Q15 Min PRN    Or    glucose-vitamin C (Dex-4) chewable tab 8 tablet  8 tablet Oral Q15 Min PRN    [COMPLETED] sodium bicarbonate injection 50 mEq  50 mEq Intravenous Once    EPINEPHrine (Adrenalin) 5 mg in sodium chloride 0.9% 250 mL infusion  1-10 mcg/min Intravenous Continuous    [COMPLETED] albumin human (Albumin) 5% injection 25 g  25 g Intravenous Once    [COMPLETED] albumin human (Albumin) 5% injection 25 g  25 g Intravenous Once     Imp/Recs:  CV - s/p 2 vessel CABG with LIMA to LAD and SVG to OM   - continue CCU care today   - deline   - consider IV lasix   - intolerant of statins - recently started Repatha as outpatient   - on ASA, zetia and metoprolol tartrate    Will follow,    Alexandro White MD

## 2024-04-20 NOTE — PLAN OF CARE
Dr Oquendo notified of 2cm R apical pneumothorax on CXR. Received call from radiologist Dr. Baltazar. No acute distress, SpO2 98% on 3L NC, HDS.

## 2024-04-20 NOTE — PROGRESS NOTES
[facility]   part of [facility]     Hospitalist Progress Note     Hilda Bello Patient Status:  Inpatient    3/6/1965 MRN [id number]   Location [facility] 6NE-A Attending Gal Oquendo MD   Hosp Day # 2 PCP Syd Gomez MD     Chief Complaint: \"I had CABG\"    Subjective:     Pt seen w/ family at the bedside. No cp or sob.     Objective:    Review of Systems:   A comprehensive review of systems was completed; pertinent positive and negatives stated in subjective.    Vital signs:  Temp:  [97 °F (36.1 °C)-99.1 °F (37.3 °C)] 97.3 °F (36.3 °C)  Pulse:  [] 75  Resp:  [11-19] 15  BP: ()/(60-74) 99/70  SpO2:  [95 %-99 %] 99 %  AO: ()/(47-63) 89/53    Physical Exam:    General: No acute distress  Respiratory: No wheezes, no rhonchi  Cardiovascular: S1, S2, regular rate and rhythm  Abdomen: Soft, Non-tender, non-distended, positive bowel sounds  Neuro: No new focal deficits.   Extremities: No edema      Diagnostic Data:    Labs:  Recent Labs   Lab 04/15/24  0911 24  1012 24  1106 24  0413 24  0545   WBC 4.4  --  21.4* 12.3* 10.9   HGB 14.3  --  11.2* 9.7* 8.7*   MCV 90.4  --  89.4 92.1 94.4   .0 178.0 182.0 134.0* 123.0*   INR 0.98 1.58* 1.39*  --   --        Recent Labs   Lab 04/15/24  0911 24  1106 24  1410 24  0413 24  0545   * 163*  --  101* 123*   BUN 12 11  --  8* 8*   CREATSERUM 0.82 0.90  --  0.68 0.67   CA 9.7 8.6  --  8.0* 8.4*   ALB 4.1  --   --   --   --     142  --  144 136   K 4.5 2.9* 3.5 3.9 4.4    112  --  112 104   CO2 29.0 26.0  --  28.0 28.0   ALKPHO 78  --   --   --   --    AST 10*  --   --   --   --    ALT 22  --   --   --   --    BILT 0.6  --   --   --   --    TP 8.1  --   --   --   --        Estimated Creatinine Clearance: 90.1 mL/min (based on SCr of 0.67 mg/dL).    No results for input(s): \"TROP\", \"TROPHS\", \"CK\" in the last 168 hours.    Recent Labs   Lab 04/15/24  0911 24  1012  04/18/24  1106   PTP 13.0 18.9* 17.2*   INR 0.98 1.58* 1.39*                  Microbiology    No results found for this visit on 04/18/24.      Imaging: Reviewed in Epic.    Medications:    insulin aspart  1-10 Units Subcutaneous TID AC and HS    sertraline  100 mg Oral Daily    ezetimibe  10 mg Oral Nightly    metoprolol tartrate  12.5 mg Oral 2x Daily(Beta Blocker)    mupirocin  1 Application Nasal BID    aspirin  81 mg Oral Daily    pantoprazole  40 mg Intravenous QAM AC    Or    pantoprazole  40 mg Oral QAM AC       Assessment & Plan:      #Multivessel CAD  -CVS primary  -s/p scheduled CABGx2 on 4/18  -post op vasopressor support, wean as tolerated  -post op insulin gtt dc'ed, now on ISS, A1c 5.8  -ezetimibe, asa, bb as tolerated  -lines out today    #Transient hypotension, improving   -s/p albumin this AM  -s/p dobutamine gtt     #Expected post op resp support  -extubated 4/18 PM  -on 2L NC at this time    #Expected post op anemia  -hgb 8.7, will cont to trend    #4mm left apical pneumothorax  -seen on CXR 4/19, cont supplemental o2; repeat cxr today pending    #Thrombocytopenia   -platelets 123 today, will monitor    #Anxiety  -PTA: sertraline     #HLD         Ale Bansal DO    Supplementary Documentation:     Quality:  DVT Mechanical Prophylaxis: MARCELLO hose, SCDs,    DVT Pharmacologic Prophylaxis   Medication   None         DVT Pharmacologic prophylaxis: Aspirin 81 mg      Code Status: Full Code  Blackmon: Blackmon catheter in place  Blackmon Duration (in days): 2  Central line:    KAPIL:     Discharge is dependent on: post op recovery  At this point Ms. Bello is expected to be discharge to: tbd    The 21st Century Cures Act makes medical notes like these available to patients in the interest of transparency. Please be advised this is a medical document. Medical documents are intended to carry relevant information, facts as evident, and the clinical opinion of the practitioner. The medical note is intended as peer to  peer communication and may appear blunt or direct. It is written in medical language and may contain abbreviations or verbiage that are unfamiliar.

## 2024-04-20 NOTE — PLAN OF CARE
Assumed care 1930, s/p CABG.  Weaned off dobutamine gtt at 0300, SR-ST on monitor, VSS.  CT output decreasing, 10ml/hr at the highest output.  Urine output adequate.  Zofran given for episode of nausea with relief.  Stated she did not sleep well last night despite receiving melatonin.

## 2024-04-21 PROBLEM — I25.10 CAD IN NATIVE ARTERY: Status: ACTIVE | Noted: 2024-04-15

## 2024-04-21 LAB
ANION GAP SERPL CALC-SCNC: 5 MMOL/L (ref 0–18)
BASOPHILS # BLD AUTO: 0.02 X10(3) UL (ref 0–0.2)
BASOPHILS NFR BLD AUTO: 0.2 %
BLOOD TYPE BARCODE: 5100
BUN BLD-MCNC: 9 MG/DL (ref 9–23)
CALCIUM BLD-MCNC: 8.5 MG/DL (ref 8.5–10.1)
CHLORIDE SERPL-SCNC: 104 MMOL/L (ref 98–112)
CO2 SERPL-SCNC: 31 MMOL/L (ref 21–32)
CREAT BLD-MCNC: 0.66 MG/DL
EGFRCR SERPLBLD CKD-EPI 2021: 102 ML/MIN/1.73M2 (ref 60–?)
EOSINOPHIL # BLD AUTO: 0.05 X10(3) UL (ref 0–0.7)
EOSINOPHIL NFR BLD AUTO: 0.5 %
ERYTHROCYTE [DISTWIDTH] IN BLOOD BY AUTOMATED COUNT: 13.5 %
ERYTHROCYTE [DISTWIDTH] IN BLOOD BY AUTOMATED COUNT: 13.5 %
GLUCOSE BLD-MCNC: 117 MG/DL (ref 70–99)
GLUCOSE BLD-MCNC: 119 MG/DL (ref 70–99)
GLUCOSE BLD-MCNC: 134 MG/DL (ref 70–99)
GLUCOSE BLD-MCNC: 146 MG/DL (ref 70–99)
GLUCOSE BLD-MCNC: 166 MG/DL (ref 70–99)
HCT VFR BLD AUTO: 28.1 %
HCT VFR BLD AUTO: 28.1 %
HGB BLD-MCNC: 8.8 G/DL
HGB BLD-MCNC: 8.8 G/DL
IMM GRANULOCYTES # BLD AUTO: 0.03 X10(3) UL (ref 0–1)
IMM GRANULOCYTES NFR BLD: 0.3 %
LYMPHOCYTES # BLD AUTO: 0.82 X10(3) UL (ref 1–4)
LYMPHOCYTES NFR BLD AUTO: 8.4 %
MAGNESIUM SERPL-MCNC: 1.9 MG/DL (ref 1.6–2.6)
MCH RBC QN AUTO: 30.2 PG (ref 26–34)
MCH RBC QN AUTO: 30.2 PG (ref 26–34)
MCHC RBC AUTO-ENTMCNC: 31.3 G/DL (ref 31–37)
MCHC RBC AUTO-ENTMCNC: 31.3 G/DL (ref 31–37)
MCV RBC AUTO: 96.6 FL
MCV RBC AUTO: 96.6 FL
MONOCYTES # BLD AUTO: 1.08 X10(3) UL (ref 0.1–1)
MONOCYTES NFR BLD AUTO: 11 %
NEUTROPHILS # BLD AUTO: 7.79 X10 (3) UL (ref 1.5–7.7)
NEUTROPHILS # BLD AUTO: 7.79 X10(3) UL (ref 1.5–7.7)
NEUTROPHILS NFR BLD AUTO: 79.6 %
OSMOLALITY SERPL CALC.SUM OF ELEC: 291 MOSM/KG (ref 275–295)
PLATELET # BLD AUTO: 153 10(3)UL (ref 150–450)
PLATELET # BLD AUTO: 153 10(3)UL (ref 150–450)
POTASSIUM SERPL-SCNC: 4.2 MMOL/L (ref 3.5–5.1)
RBC # BLD AUTO: 2.91 X10(6)UL
RBC # BLD AUTO: 2.91 X10(6)UL
SODIUM SERPL-SCNC: 140 MMOL/L (ref 136–145)
UNIT VOLUME: 350 ML
WBC # BLD AUTO: 9.8 X10(3) UL (ref 4–11)
WBC # BLD AUTO: 9.8 X10(3) UL (ref 4–11)

## 2024-04-21 PROCEDURE — 99232 SBSQ HOSP IP/OBS MODERATE 35: CPT | Performed by: HOSPITALIST

## 2024-04-21 RX ORDER — DIPHENHYDRAMINE HCL 25 MG
25 CAPSULE ORAL NIGHTLY PRN
Status: DISCONTINUED | OUTPATIENT
Start: 2024-04-21 | End: 2024-04-22

## 2024-04-21 NOTE — PLAN OF CARE
Assumed care 1930, s/p CABG.  A&O x4, pleasant and cooperative.  Walking in halls with standby assist, tolerated well.  Xanax given for anxiety prior to sleep last night, pt still did not sleep well and woke up frequently with chest pain and lower back pain, medicated with norco.  VSS.

## 2024-04-21 NOTE — PHYSICAL THERAPY NOTE
PHYSICAL THERAPY TREATMENT NOTE - INPATIENT    Room Number: 6600/6600-A     Session: 1     Number of Visits to Meet Established Goals: 5    Presenting Problem: s/p  Coronary revascularization x2:  Left internal mammary artery graft to the left anterior descending, saphenous vein graft to the obtuse marginal coronary artery. 4/18  Co-Morbidities : MVCAD, HLD, anxiety, depression    ASSESSMENT   Patient demonstrates good  progress this session, goals  progressing with 2/4 met this session    Patient continues to function below baseline with stair negotiation.  Contributing factors to remaining limitations include decreased endurance/aerobic capacity.  Next session anticipate patient to progress stair negotiation.  Physical Therapy will continue to follow patient for duration of hospitalization.    Patient continues to benefit from continued skilled PT services: at discharge to promote functional independence in home.  Anticipate patient will return home with home health PT.    PLAN  PT Treatment Plan: Bed mobility;Body mechanics;Coordination;Endurance;Energy conservation;Patient education;Family education;Gait training;Neuromuscular re-educate;Range of motion;Strengthening;Stoop training;Stair training;Transfer training;Balance training  Rehab Potential : Good  Frequency (Obs): 3-5x/week    CURRENT GOALS     Goal #1 Patient is able to demonstrate supine - sit EOB @ level: supervision     Goal #2 Patient is able to demonstrate transfers EOB to/from Chair/Wheelchair at assistance level: minimum assistance  MET      Goal #3 Patient is able to ambulate 150 feet with assist device: walker - rolling at assistance level: supervision  MET   Goal #4 Patient is able to navigate stairs with rail and SBA   Goal #5    Goal #6    Goal Comments: Goals established on 4/19/2024 4/21/2024 see above.     SUBJECTIVE  \" I am better.\"    OBJECTIVE  Precautions: Sternal;Cardiac    WEIGHT BEARING RESTRICTION  Weight Bearing  Restriction: None                PAIN ASSESSMENT   Rating: Unable to rate  Location: sternal  Management Techniques: Activity promotion;Body mechanics;Repositioning    BALANCE                                                                                                                       Static Sitting: Good  Dynamic Sitting: Good           Static Standing: Fair -  Dynamic Standing: Fair -    ACTIVITY TOLERANCE                         O2 WALK         AM-PAC '6-Clicks' INPATIENT SHORT FORM - BASIC MOBILITY  How much difficulty does the patient currently have...  Patient Difficulty: Turning over in bed (including adjusting bedclothes, sheets and blankets)?: None   Patient Difficulty: Sitting down on and standing up from a chair with arms (e.g., wheelchair, bedside commode, etc.): None   Patient Difficulty: Moving from lying on back to sitting on the side of the bed?: A Little   How much help from another person does the patient currently need...   Help from Another: Moving to and from a bed to a chair (including a wheelchair)?: A Little   Help from Another: Need to walk in hospital room?: A Little   Help from Another: Climbing 3-5 steps with a railing?: A Little       AM-PAC Score:  Raw Score: 20   Approx Degree of Impairment: 35.83%   Standardized Score (AM-PAC Scale): 47.67   CMS Modifier (G-Code): CJ    FUNCTIONAL ABILITY STATUS  Gait Assessment   Functional Mobility/Gait Assessment  Gait Assistance: Contact guard assist  Distance (ft): 300  Assistive Device: None  Pattern: Within Functional Limits    Skilled Therapy Provided    Bed Mobility:  Rolling: NT   Supine<>Sit: NT   Sit<>Supine: NT  Instructed to log roll. Log roll technique reviewed.      Transfer Mobility:  Sit<>Stand: SBA   Stand<>Sit: SBA  Gait: weaned assistive device. Ambulated 300' with cga to stand by assist. Patient needed 4 standing rest breaks to recover from sob. Patient was educated on energy conservation.     Therapist's Comments: andrea  reviewed. Pt used bathroom during session, able to manage rosie care.   Addressed all questions. Patient preferred to do stairs next session.       Patient End of Session: Up in chair;Needs met;Call light within reach;With  staff;RN aware of session/findings;All patient questions and concerns addressed    PT Session Time: 23 minutes  Gait Trainin minutes  Therapeutic Activity: 11 minutes

## 2024-04-21 NOTE — PROGRESS NOTES
[facility] Cardiology Progress Note    Patient seen and examined.  Chart reviewed.  Discussed with RN    Chest pain controlled with Norco.  No shortness of breath.    BP (!) 159/135   Pulse 86   Temp 98 °F (36.7 °C) (Temporal)   Resp 19   Ht 67\"   Wt 184 lb 4.8 oz   SpO2 93%   BMI 28.87 kg/m²   Gen: alert and oriented  HEENT: moist mucous membranes  Neck: No JVD  CV: regular  Lungs: CTAB  Abd: soft, BS present  Ext: no edema  Skin: warm and dry  Tele: sinus rhythm      Intake/Output Summary (Last 24 hours) at 4/21/2024 0739  Last data filed at 4/21/2024 0600  Gross per 24 hour   Intake 1441.9 ml   Output 1920 ml   Net -478.1 ml       Lab Results   Component Value Date    WBC 9.8 04/21/2024    WBC 9.8 04/21/2024    HGB 8.8 04/21/2024    HGB 8.8 04/21/2024    HCT 28.1 04/21/2024    HCT 28.1 04/21/2024    .0 04/21/2024    .0 04/21/2024    CREATSERUM 0.66 04/21/2024    BUN 9 04/21/2024     04/21/2024    K 4.2 04/21/2024     04/21/2024    CO2 31.0 04/21/2024     04/21/2024    CA 8.5 04/21/2024    MG 1.9 04/21/2024      ALPRAZolam (Xanax) tab 0.25 mg  0.25 mg Oral Q6H PRN    insulin aspart (NovoLOG) 100 Units/mL FlexPen 1-10 Units  1-10 Units Subcutaneous TID AC and HS    HYDROcodone-acetaminophen (Norco) 5-325 MG per tab 1 tablet  1 tablet Oral Q4H PRN    Or    HYDROcodone-acetaminophen (Norco) 5-325 MG per tab 2 tablet  2 tablet Oral Q4H PRN    [COMPLETED] albumin human (Albumin) 5% injection 12.5 g  12.5 g Intravenous Once    [COMPLETED] mupirocin (Bactroban) 2% nasal ointment 1 Application  1 Application Nasal Once    [COMPLETED] ceFAZolin (Ancef) 2 g in 20mL IV syringe premix  2 g Intravenous On Call    [COMPLETED] metoprolol tartrate (Lopressor) tab 25 mg  25 mg Oral 2x Daily(Beta Blocker)    ipratropium-albuterol (Duoneb) 0.5-2.5 (3) MG/3ML inhalation solution 3 mL  3 mL Nebulization Q4H PRN    sertraline (Zoloft) tab 100 mg  100 mg Oral Daily    ezetimibe (Zetia) tab 10 mg  10 mg  Oral Nightly    sodium chloride 0.9% infusion   Intravenous Continuous    morphINE PF 2 MG/ML injection 2 mg  2 mg Intravenous Q2H PRN    Or    morphINE PF 4 MG/ML injection 4 mg  4 mg Intravenous Q2H PRN    melatonin tab 3 mg  3 mg Oral Nightly PRN    polyethylene glycol (PEG 3350) (Miralax) 17 g oral packet 17 g  17 g Oral Daily PRN    sennosides (Senokot) tab 17.2 mg  17.2 mg Oral Nightly PRN    bisacodyl (Dulcolax) 10 MG rectal suppository 10 mg  10 mg Rectal Daily PRN    fleet enema (Fleet) 7-19 GM/118ML rectal enema 133 mL  1 enema Rectal Once PRN    ondansetron (Zofran) 4 MG/2ML injection 4 mg  4 mg Intravenous Q6H PRN    [COMPLETED] vancomycin (Vancocin) 1.25 g in sodium chloride 0.9% 250mL IVPB premix  15 mg/kg Intravenous Q12H    [] albumin human (Albumin) 5% injection 12.5 g  12.5 g Intravenous Once PRN    DOBUTamine in dextrose 5% (Dobutrex) 500 mg/250mL infusion premix  2.5-20 mcg/kg/min (Dosing Weight) Intravenous Continuous PRN    nitroGLYCERIN in dextrose 5% 50 mg/250mL infusion premix  5-300 mcg/min Intravenous Continuous PRN    norepinephrine (Levophed) 4 mg/250mL infusion premix  0.5-30 mcg/min Intravenous Continuous PRN    NitroPRUSSide (Nipride) 50 mg in dextrose 5% 250 mL infusion  0.1-4 mcg/kg/min (Dosing Weight) Intravenous Continuous PRN    metoprolol tartrate (Lopressor) partial tab 12.5 mg  12.5 mg Oral 2x Daily(Beta Blocker)    potassium chloride 20 mEq/100mL IVPB premix 20 mEq  20 mEq Intravenous PRN    Or    potassium chloride 40 mEq/100mL IVPB premix (central line) 40 mEq  40 mEq Intravenous PRN    calcium gluconate 3 g in sodium chloride 0.9% 100 mL IVPB  3 g Intravenous PRN    magnesium sulfate in dextrose 5% 1 g/100mL infusion premix 1 g  1 g Intravenous PRN    magnesium sulfate in sterile water for injection 2 g/50mL IVPB premix 2 g  2 g Intravenous PRN    mupirocin (Bactroban) 2% nasal ointment 1 Application  1 Application Nasal BID    [] acetaminophen (Ofirmev) 10  mg/mL infusion premix 1,000 mg  1,000 mg Intravenous Q6H    aspirin DR tab 81 mg  81 mg Oral Daily    pantoprazole (Protonix) 40 mg in sodium chloride 0.9% PF 10 mL IV push  40 mg Intravenous QAM AC    Or    pantoprazole (Protonix) DR tab 40 mg  40 mg Oral QAM AC    [COMPLETED] ceFAZolin (Ancef) 2 g in 20mL IV syringe premix  2 g Intravenous Q8H    sodium chloride 0.9% infusion   Intravenous Continuous    HYDROmorphone in sodium chloride 0.9% (Dilaudid) 20 mg/100mL PCA premix   Intravenous Continuous    naloxone (Narcan) 0.4 MG/ML injection 0.08 mg  0.08 mg Intravenous Q5 Min PRN    diphenhydrAMINE (Benadryl) 50 mg/mL  injection 12.5 mg  12.5 mg Intravenous Q4H PRN    glucose (Dex4) 15 GM/59ML oral liquid 15 g  15 g Oral Q15 Min PRN    Or    glucose (Glutose) 40% oral gel 15 g  15 g Oral Q15 Min PRN    Or    glucose-vitamin C (Dex-4) chewable tab 4 tablet  4 tablet Oral Q15 Min PRN    Or    dextrose 50% injection 50 mL  50 mL Intravenous Q15 Min PRN    Or    glucose (Dex4) 15 GM/59ML oral liquid 30 g  30 g Oral Q15 Min PRN    Or    glucose (Glutose) 40% oral gel 30 g  30 g Oral Q15 Min PRN    Or    glucose-vitamin C (Dex-4) chewable tab 8 tablet  8 tablet Oral Q15 Min PRN    [COMPLETED] sodium bicarbonate injection 50 mEq  50 mEq Intravenous Once    EPINEPHrine (Adrenalin) 5 mg in sodium chloride 0.9% 250 mL infusion  1-10 mcg/min Intravenous Continuous    [COMPLETED] albumin human (Albumin) 5% injection 25 g  25 g Intravenous Once    [COMPLETED] albumin human (Albumin) 5% injection 25 g  25 g Intravenous Once     Imp/Recs:  CV - doing well post-CABG.  Moving around more.  Using Norco for pain.  Continue ASA and oral BB.  Hold on any diuretic at this point.  Incentive spirometry.  Ok to transfer to the floor. Only on zetia, started Repatha as outpatient.    Will follow,    Alexandro White MD

## 2024-04-21 NOTE — PROGRESS NOTES
[facility]   [facility] Progress Note    Hilda Bello Patient Status:  Inpatient    3/6/1965 MRN [id number]   Location [facility] 6NE-A Attending Gal Oquendo MD   Hosp Day # 3 PCP Syd Gomez MD     Subjective:  Sitting up in chair more pain last night taking oral pain medication helps. Xanax helps with anxiety. Walked hallways .     Objective:  BP (!) 159/135   Pulse 86   Temp 98 °F (36.7 °C) (Temporal)   Resp 19   Ht 170.2 cm (5' 7\")   Wt 83.6 kg (184 lb 4.8 oz)   SpO2 93%   BMI 28.87 kg/m²          Intake/Output:    Intake/Output Summary (Last 24 hours) at 2024 0745  Last data filed at 2024 0600  Gross per 24 hour   Intake 1441.9 ml   Output 1920 ml   Net -478.1 ml           Last 3 Weights   24 0600 83.6 kg (184 lb 4.8 oz)   24 0600 85 kg (187 lb 6.4 oz)   24 0500 83.5 kg (184 lb 1.4 oz)   24 0606 78.3 kg (172 lb 11.2 oz)   04/15/24 1405 79.9 kg (176 lb 4 oz)   24 0935 79.8 kg (176 lb)           Allergies:  Allergies   Allergen Reactions    Statins MYALGIA       Current Facility-Administered Medications   Medication Dose Route Frequency    ALPRAZolam (Xanax) tab 0.25 mg  0.25 mg Oral Q6H PRN    insulin aspart (NovoLOG) 100 Units/mL FlexPen 1-10 Units  1-10 Units Subcutaneous TID AC and HS    HYDROcodone-acetaminophen (Norco) 5-325 MG per tab 1 tablet  1 tablet Oral Q4H PRN    Or    HYDROcodone-acetaminophen (Norco) 5-325 MG per tab 2 tablet  2 tablet Oral Q4H PRN    ipratropium-albuterol (Duoneb) 0.5-2.5 (3) MG/3ML inhalation solution 3 mL  3 mL Nebulization Q4H PRN    sertraline (Zoloft) tab 100 mg  100 mg Oral Daily    ezetimibe (Zetia) tab 10 mg  10 mg Oral Nightly    sodium chloride 0.9% infusion   Intravenous Continuous    morphINE PF 2 MG/ML injection 2 mg  2 mg Intravenous Q2H PRN    Or    morphINE PF 4 MG/ML injection 4 mg  4 mg Intravenous Q2H PRN    melatonin tab 3 mg  3 mg Oral Nightly PRN    polyethylene glycol (PEG 3350) (Miralax) 17 g  oral packet 17 g  17 g Oral Daily PRN    sennosides (Senokot) tab 17.2 mg  17.2 mg Oral Nightly PRN    bisacodyl (Dulcolax) 10 MG rectal suppository 10 mg  10 mg Rectal Daily PRN    fleet enema (Fleet) 7-19 GM/118ML rectal enema 133 mL  1 enema Rectal Once PRN    ondansetron (Zofran) 4 MG/2ML injection 4 mg  4 mg Intravenous Q6H PRN    DOBUTamine in dextrose 5% (Dobutrex) 500 mg/250mL infusion premix  2.5-20 mcg/kg/min (Dosing Weight) Intravenous Continuous PRN    nitroGLYCERIN in dextrose 5% 50 mg/250mL infusion premix  5-300 mcg/min Intravenous Continuous PRN    norepinephrine (Levophed) 4 mg/250mL infusion premix  0.5-30 mcg/min Intravenous Continuous PRN    NitroPRUSSide (Nipride) 50 mg in dextrose 5% 250 mL infusion  0.1-4 mcg/kg/min (Dosing Weight) Intravenous Continuous PRN    metoprolol tartrate (Lopressor) partial tab 12.5 mg  12.5 mg Oral 2x Daily(Beta Blocker)    potassium chloride 20 mEq/100mL IVPB premix 20 mEq  20 mEq Intravenous PRN    Or    potassium chloride 40 mEq/100mL IVPB premix (central line) 40 mEq  40 mEq Intravenous PRN    calcium gluconate 3 g in sodium chloride 0.9% 100 mL IVPB  3 g Intravenous PRN    magnesium sulfate in dextrose 5% 1 g/100mL infusion premix 1 g  1 g Intravenous PRN    magnesium sulfate in sterile water for injection 2 g/50mL IVPB premix 2 g  2 g Intravenous PRN    mupirocin (Bactroban) 2% nasal ointment 1 Application  1 Application Nasal BID    aspirin DR tab 81 mg  81 mg Oral Daily    pantoprazole (Protonix) 40 mg in sodium chloride 0.9% PF 10 mL IV push  40 mg Intravenous QAM AC    Or    pantoprazole (Protonix) DR tab 40 mg  40 mg Oral QAM AC    sodium chloride 0.9% infusion   Intravenous Continuous    HYDROmorphone in sodium chloride 0.9% (Dilaudid) 20 mg/100mL PCA premix   Intravenous Continuous    naloxone (Narcan) 0.4 MG/ML injection 0.08 mg  0.08 mg Intravenous Q5 Min PRN    diphenhydrAMINE (Benadryl) 50 mg/mL  injection 12.5 mg  12.5 mg Intravenous Q4H PRN     glucose (Dex4) 15 GM/59ML oral liquid 15 g  15 g Oral Q15 Min PRN    Or    glucose (Glutose) 40% oral gel 15 g  15 g Oral Q15 Min PRN    Or    glucose-vitamin C (Dex-4) chewable tab 4 tablet  4 tablet Oral Q15 Min PRN    Or    dextrose 50% injection 50 mL  50 mL Intravenous Q15 Min PRN    Or    glucose (Dex4) 15 GM/59ML oral liquid 30 g  30 g Oral Q15 Min PRN    Or    glucose (Glutose) 40% oral gel 30 g  30 g Oral Q15 Min PRN    Or    glucose-vitamin C (Dex-4) chewable tab 8 tablet  8 tablet Oral Q15 Min PRN    EPINEPHrine (Adrenalin) 5 mg in sodium chloride 0.9% 250 mL infusion  1-10 mcg/min Intravenous Continuous       Labs:  Lab Results   Component Value Date    WBC 9.8 04/21/2024    WBC 9.8 04/21/2024    HGB 8.8 04/21/2024    HGB 8.8 04/21/2024    HCT 28.1 04/21/2024    HCT 28.1 04/21/2024    .0 04/21/2024    .0 04/21/2024    CREATSERUM 0.66 04/21/2024    BUN 9 04/21/2024     04/21/2024    K 4.2 04/21/2024     04/21/2024    CO2 31.0 04/21/2024     04/21/2024    CA 8.5 04/21/2024       Physical Exam:    Neuro: NAD intact A/O X4   Lungs: Clear no sob no wheezing comfortable on RA   Heart: RRR S1 S2 sternum stable   Abdomen: Soft non tender BS present no nausea   Extremities: Warm and dry trace edema   Pulses: palpable   Incisions: Sternotomy SAMEERA C/D/I LE incision C/D/I        Assessment/Plan:  Patient Active Problem List   Diagnosis    Mixed hyperlipidemia    Iron deficiency anemia due to chronic blood loss    Dysfunctional uterine bleeding    Perimenopausal menorrhagia    Dense breast tissue    Anxiety and depression    Chronic migraine    Atypical ductal hyperplasia of left breast    Special screening for malignant neoplasm of colon    Benign neoplasm of cecum    Colon polyps    Diverticulosis of colon    Internal hemorrhoids    CAD in native artery           POD # 3 S/P CABG x2 with LIMA - LAD , SVG - OM      - HD stable off pressors   - Leukocytosis resolved no fevers monitor    - Acute blood loss expected post op anemia - monitor   - TCP , likely 2/2 consumption- trending up   - Renal function intact making urine   - Bowel regimen   - Pain management Norco  - PW in keep  - Encourage IS/ Ambulation   - GI PPX protonix   - DVT PPX Damon's / SCD'S   - PT/OT/ Cardiac rehab   - Keep in CCU   - Discharge planning     D/W Dr.Foy Laureen PEREZ, RN  4/21/2024  7:45 AM

## 2024-04-21 NOTE — PROGRESS NOTES
[facility]   part of [facility]     Hospitalist Progress Note     Hilda Bello Patient Status:  Inpatient    3/6/1965 MRN [id number]   Location [facility] 6NE-A Attending Gal Oquendo MD   Hosp Day # 3 PCP Syd Gomez MD     Chief Complaint: \"I had CABG\"    Subjective:     Pt doing well, pain controlled, up ambulating, tolerating diet, good urinary output.    Objective:    Review of Systems:   A comprehensive review of systems was completed; pertinent positive and negatives stated in subjective.    Vital signs:  Temp:  [97 °F (36.1 °C)-98 °F (36.7 °C)] 98 °F (36.7 °C)  Pulse:  [74-86] 86  Resp:  [11-24] 19  BP: ()/() 159/135  SpO2:  [93 %-99 %] 93 %  AO: ()/(53-58) 110/58    Physical Exam:    General: No acute distress  Respiratory: No wheezes, no rhonchi  Cardiovascular: S1, S2, regular rate and rhythm  Abdomen: Soft, Non-tender, non-distended, positive bowel sounds  Neuro: No new focal deficits.   Extremities: No edema      Diagnostic Data:    Labs:  Recent Labs   Lab 04/15/24  0911 24  1012 24  1106 24  0413 24  0545 24  0404   WBC 4.4  --  21.4* 12.3* 10.9 9.8  9.8   HGB 14.3  --  11.2* 9.7* 8.7* 8.8*  8.8*   MCV 90.4  --  89.4 92.1 94.4 96.6  96.6   .0 178.0 182.0 134.0* 123.0* 153.0  153.0   INR 0.98 1.58* 1.39*  --   --   --        Recent Labs   Lab 04/15/24  0911 24  1106 24  0413 24  0545 24  0404   *   < > 101* 123* 134*   BUN 12   < > 8* 8* 9   CREATSERUM 0.82   < > 0.68 0.67 0.66   CA 9.7   < > 8.0* 8.4* 8.5   ALB 4.1  --   --   --   --       < > 144 136 140   K 4.5   < > 3.9 4.4 4.2      < > 112 104 104   CO2 29.0   < > 28.0 28.0 31.0   ALKPHO 78  --   --   --   --    AST 10*  --   --   --   --    ALT 22  --   --   --   --    BILT 0.6  --   --   --   --    TP 8.1  --   --   --   --     < > = values in this interval not displayed.       Estimated Creatinine Clearance: 91.5  mL/min (based on SCr of 0.66 mg/dL).    No results for input(s): \"TROP\", \"TROPHS\", \"CK\" in the last 168 hours.    Recent Labs   Lab 04/15/24  0911 04/18/24  1012 04/18/24  1106   PTP 13.0 18.9* 17.2*   INR 0.98 1.58* 1.39*                  Microbiology    No results found for this visit on 04/18/24.      Imaging: Reviewed in Epic.    Medications:    insulin aspart  1-10 Units Subcutaneous TID AC and HS    sertraline  100 mg Oral Daily    ezetimibe  10 mg Oral Nightly    metoprolol tartrate  12.5 mg Oral 2x Daily(Beta Blocker)    mupirocin  1 Application Nasal BID    aspirin  81 mg Oral Daily    pantoprazole  40 mg Intravenous QAM AC    Or    pantoprazole  40 mg Oral QAM AC       Assessment & Plan:      #Multivessel CAD  -CVS primary  -s/p scheduled CABGx2 on 4/18  -off pressors  -ezetimibe, asa, bb as tolerated    #Transient hypotension, resolved  -s/p albumin  -s/p dobutamine gtt    #Stress hyperglycema  A1c 5.8  Off insulin gtt     #Expected post op resp support, resolved  -extubated 4/18 PM  -on RA    #Expected post op anemia  -hgb 8.8, will cont to trend    #4mm left apical pneumothorax  -seen on CXR 4/19, cont supplemental o2; repeat cxr reviewed    #Thrombocytopenia, resolved  -platelets 153 today    #Anxiety  -PTA: sertraline     #HLD     Reuben Lobo MD    Supplementary Documentation:     Quality:  DVT Mechanical Prophylaxis: MARCELLO hose, SCDs,    DVT Pharmacologic Prophylaxis   Medication   None         DVT Pharmacologic prophylaxis: Aspirin 81 mg      Code Status: Full Code  Blackmon: No urinary catheter in place  Blackmon Duration (in days): 2  Central line:    KAPIL: 4/22/2024    Discharge is dependent on: post op recovery  At this point Ms. Bello is expected to be discharge to: tbd    The 21st Century Cures Act makes medical notes like these available to patients in the interest of transparency. Please be advised this is a medical document. Medical documents are intended to carry relevant information, facts as  evident, and the clinical opinion of the practitioner. The medical note is intended as peer to peer communication and may appear blunt or direct. It is written in medical language and may contain abbreviations or verbiage that are unfamiliar.

## 2024-04-22 VITALS
RESPIRATION RATE: 23 BRPM | HEIGHT: 67 IN | SYSTOLIC BLOOD PRESSURE: 116 MMHG | WEIGHT: 183.19 LBS | BODY MASS INDEX: 28.75 KG/M2 | OXYGEN SATURATION: 96 % | TEMPERATURE: 98 F | HEART RATE: 86 BPM | DIASTOLIC BLOOD PRESSURE: 68 MMHG

## 2024-04-22 PROBLEM — D69.6 THROMBOCYTOPENIA (HCC): Status: ACTIVE | Noted: 2024-04-22

## 2024-04-22 PROBLEM — D69.6 THROMBOCYTOPENIA: Status: ACTIVE | Noted: 2024-04-22

## 2024-04-22 PROBLEM — F41.9 ANXIETY: Status: ACTIVE | Noted: 2024-04-22

## 2024-04-22 PROBLEM — D62 ANEMIA DUE TO ACUTE BLOOD LOSS: Status: ACTIVE | Noted: 2024-04-22

## 2024-04-22 PROBLEM — E78.5 HYPERLIPIDEMIA: Status: ACTIVE | Noted: 2024-04-22

## 2024-04-22 LAB
ANION GAP SERPL CALC-SCNC: 7 MMOL/L (ref 0–18)
BUN BLD-MCNC: 7 MG/DL (ref 9–23)
CALCIUM BLD-MCNC: 8.4 MG/DL (ref 8.5–10.1)
CHLORIDE SERPL-SCNC: 109 MMOL/L (ref 98–112)
CO2 SERPL-SCNC: 27 MMOL/L (ref 21–32)
CREAT BLD-MCNC: 0.7 MG/DL
EGFRCR SERPLBLD CKD-EPI 2021: 101 ML/MIN/1.73M2 (ref 60–?)
ERYTHROCYTE [DISTWIDTH] IN BLOOD BY AUTOMATED COUNT: 13.3 %
GLUCOSE BLD-MCNC: 122 MG/DL (ref 70–99)
GLUCOSE BLD-MCNC: 99 MG/DL (ref 70–99)
HCT VFR BLD AUTO: 25.6 %
HGB BLD-MCNC: 8.6 G/DL
MCH RBC QN AUTO: 30.9 PG (ref 26–34)
MCHC RBC AUTO-ENTMCNC: 33.6 G/DL (ref 31–37)
MCV RBC AUTO: 92.1 FL
OSMOLALITY SERPL CALC.SUM OF ELEC: 295 MOSM/KG (ref 275–295)
PLATELET # BLD AUTO: 201 10(3)UL (ref 150–450)
POTASSIUM SERPL-SCNC: 3.3 MMOL/L (ref 3.5–5.1)
RBC # BLD AUTO: 2.78 X10(6)UL
SODIUM SERPL-SCNC: 143 MMOL/L (ref 136–145)
WBC # BLD AUTO: 6.8 X10(3) UL (ref 4–11)

## 2024-04-22 PROCEDURE — 99232 SBSQ HOSP IP/OBS MODERATE 35: CPT | Performed by: INTERNAL MEDICINE

## 2024-04-22 RX ORDER — HYDROCODONE BITARTRATE AND ACETAMINOPHEN 5; 325 MG/1; MG/1
1-2 TABLET ORAL EVERY 6 HOURS PRN
Qty: 30 TABLET | Refills: 0 | Status: SHIPPED | OUTPATIENT
Start: 2024-04-22 | End: 2024-05-01

## 2024-04-22 RX ORDER — METOPROLOL SUCCINATE 25 MG/1
25 TABLET, EXTENDED RELEASE ORAL DAILY
Qty: 30 TABLET | Refills: 11 | Status: SHIPPED | OUTPATIENT
Start: 2024-04-22

## 2024-04-22 RX ORDER — POTASSIUM CHLORIDE 20 MEQ/1
40 TABLET, EXTENDED RELEASE ORAL EVERY 4 HOURS
Status: COMPLETED | OUTPATIENT
Start: 2024-04-22 | End: 2024-04-22

## 2024-04-22 RX ORDER — ASPIRIN 81 MG/1
81 TABLET ORAL DAILY
Status: SHIPPED | COMMUNITY
Start: 2024-04-23

## 2024-04-22 NOTE — CARDIAC REHAB
Cardiac rehab education completed with patient  Patient referred to cardiac rehab  Appointment made  Patient viewed dc video

## 2024-04-22 NOTE — PLAN OF CARE
Assumed care of patient around 1930. Patient alert and oriented x4. NSR on monitor. SBP within normal limits. Spo2 sars 90-94% on RA. Ambulating to bathroom as needed to void. No BM yet, this RN offered stool softners - patient declined. PRN [place] for back and incisional pain. PRN xanax for anxiety administered. Pt slept poorly, awake most of the night transferring to and from chair or attempting to have BM.

## 2024-04-22 NOTE — PROGRESS NOTES
[facility]   part of [facility]     Hospitalist Progress Note     Hilda Bello Patient Status:  Inpatient    3/6/1965 MRN [id number]   Location [facility] 6NE-A Attending Gal Oquendo MD   Hosp Day # 4 PCP Syd Gomez MD     Chief Complaint: \"I had CABG\"    Subjective:     Pt doing well, pain controlled, up ambulating, tolerating diet, good urinary output.  No new complaints.  Being discharged by cardiology and CV surgeon today, eager to go home.  Passing gas, no bowel movements yet.    Objective:    Review of Systems:   A comprehensive review of systems was completed; pertinent positive and negatives stated in subjective.    Vital signs:  Temp:  [97.3 °F (36.3 °C)-98 °F (36.7 °C)] 97.9 °F (36.6 °C)  Pulse:  [74-95] 86  Resp:  [12-27] 23  BP: ()/(66-93) 116/68  SpO2:  [86 %-99 %] 96 %    Physical Exam:    /68 (BP Location: Right arm)   Pulse 86   Temp 97.9 °F (36.6 °C) (Temporal)   Resp 23   Ht 5' 7\" (1.702 m)   Wt 183 lb 3.2 oz (83.1 kg)   SpO2 96%   BMI 28.69 kg/m²     General: No acute distress  Respiratory: Clear to auscultation bilateral, no wheezes, no rhonchi  Cardiovascular: S1, S2, regular rate and rhythm.  Sternal incision in Steri-Strip healing well  Abdomen: Soft, Non-tender, non-distended, positive bowel sounds  Neuro: Awake alert, no new focal deficits.   Extremities: No pedal edema or calf tenderness      Diagnostic Data:    Labs:  Recent Labs   Lab 24  1012 24  1106 24  0413 24  0545 24  0404 24  0341   WBC  --  21.4* 12.3* 10.9 9.8  9.8 6.8   HGB  --  11.2* 9.7* 8.7* 8.8*  8.8* 8.6*   MCV  --  89.4 92.1 94.4 96.6  96.6 92.1   .0 182.0 134.0* 123.0* 153.0  153.0 201.0   INR 1.58* 1.39*  --   --   --   --        Recent Labs   Lab 24  0545 24  0404 24  0341   * 134* 122*   BUN 8* 9 7*   CREATSERUM 0.67 0.66 0.70   CA 8.4* 8.5 8.4*    140 143   K 4.4 4.2 3.3*    104 109   CO2  28.0 31.0 27.0       Estimated Creatinine Clearance: 86.2 mL/min (based on SCr of 0.7 mg/dL).    No results for input(s): \"TROP\", \"TROPHS\", \"CK\" in the last 168 hours.    Recent Labs   Lab 04/18/24  1012 04/18/24  1106   PTP 18.9* 17.2*   INR 1.58* 1.39*                  Microbiology    No results found for this visit on 04/18/24.      Imaging: Reviewed in Epic.    Medications:    insulin aspart  1-10 Units Subcutaneous TID AC and HS    sertraline  100 mg Oral Daily    ezetimibe  10 mg Oral Nightly    metoprolol tartrate  12.5 mg Oral 2x Daily(Beta Blocker)    mupirocin  1 Application Nasal BID    aspirin  81 mg Oral Daily    pantoprazole  40 mg Intravenous QAM AC    Or    pantoprazole  40 mg Oral QAM AC       Assessment & Plan:      Multivessel CAD status post CABG-managed by CV surgeon and cardiology  -CVS primary  -s/p scheduled CABGx2 on 4/18  -off pressors  -ezetimibe, asa, bb as tolerated    #Transient hypotension, resolved  -s/p albumin  -s/p dobutamine gtt and off    #Stress hyperglycema  A1c 5.8  Off insulin gtt  Blood sugar controlled, 99 this morning     #Expected post op resp support, resolved  -extubated 4/18 PM  -on RA    #Expected  anemia of acute blood loss  -hgb 8.6, stable    #4mm left apical pneumothorax  -seen on CXR 4/19, cont supplemental o2; repeat cxr reviewed    #Thrombocytopenia, resolved  -platelets 201 today, normal    #Anxiety  -PTA: sertraline     #HLD  -continue Zetia  -Not on statin as has history of allergy to statin     Being discharged home today by cardiology and CV surgery, cardiac medications at the time of discharge per cardiology.  Wound care and activity per CV surgeon.  Follow-up with cardiology and CV surgeon as outpatient as directed.  Follow-up with regular outpatient primary care physician Syd Gomez MD  within 1 week in office    Natalie Lilly MD      Supplementary Documentation:     Quality:  DVT Mechanical Prophylaxis: MARCELLO hose, TRISTAs,    DVT Pharmacologic  Prophylaxis   Medication   None         DVT Pharmacologic prophylaxis: Aspirin 81 mg      Code Status: Full Code  Blackmon: No urinary catheter in place  Blackmon Duration (in days): 2  Central line:    KAPIL: 4/22/2024    Discharge is dependent on: Per cardiology and CV surgeon  At this point Ms. Bello is expected to be discharge to: Home    The 21st Century Cures Act makes medical notes like these available to patients in the interest of transparency. Please be advised this is a medical document. Medical documents are intended to carry relevant information, facts as evident, and the clinical opinion of the practitioner. The medical note is intended as peer to peer communication and may appear blunt or direct. It is written in medical language and may contain abbreviations or verbiage that are unfamiliar.

## 2024-04-22 NOTE — PROGRESS NOTES
[facility]  Progress Note    Hilda Bello Patient Status:  Inpatient    3/6/1965 MRN [id number]   [facility] 6NE-A Attending Gal Oquendo MD   Hosp Day # 4 PCP Syd Gomez MD       Assessment:    CAD POD 4 CABG (LIMA>LAD; SVG>OM)  Hyperlipidemia  Thrombocytopenia - resolved  Anemia - Stable    Plan:     Cont ASA, BB and Zetia  No obvious need for diuresis  Home later today or tomorrow; CTU if tomorrow  Repatha as OP    Subjective:  No chest pain or shortness of breath. Walked 3 times yesterday; no stairs yet    Objective:  /81   Pulse 78   Temp 98 °F (36.7 °C) (Temporal)   Resp 17   Ht 5' 7\" (1.702 m)   Wt 183 lb 3.2 oz (83.1 kg)   SpO2 98%   BMI 28.69 kg/m²     Temp (24hrs), Av.7 °F (36.5 °C), Min:97.3 °F (36.3 °C), Max:98 °F (36.7 °C)      Tele  SR    Intake/Output:    Intake/Output Summary (Last 24 hours) at 2024 0721  Last data filed at 2024 0300  Gross per 24 hour   Intake 1200 ml   Output 2350 ml   Net -1150 ml       Last 3 Weights   24 0608 183 lb 3.2 oz (83.1 kg)   24 0600 184 lb 4.8 oz (83.6 kg)   24 0600 187 lb 6.4 oz (85 kg)   24 0500 184 lb 1.4 oz (83.5 kg)   24 0606 172 lb 11.2 oz (78.3 kg)   04/15/24 1405 176 lb 4 oz (79.9 kg)   24 0935 176 lb (79.8 kg)               Physical Exam:    Gen: alert, oriented x 3, NAD  Heent/neck: no jvd  Cardiac: regular rate and rhythm, normal S1,S2  Lungs: CTA; decreased BS left base  Abd: soft, NT/ND +bs  Ext: no edema      Labs:  Lab Results   Component Value Date    WBC 6.8 2024    HGB 8.6 2024    HCT 25.6 2024    .0 2024    CREATSERUM 0.70 2024    BUN 7 2024     2024    K 3.3 2024     2024    CO2 27.0 2024     2024    CA 8.4 2024       Medications:     potassium chloride  40 mEq Oral Q4H    insulin aspart  1-10 Units Subcutaneous TID AC and HS    sertraline  100 mg Oral Daily     ezetimibe  10 mg Oral Nightly    metoprolol tartrate  12.5 mg Oral 2x Daily(Beta Blocker)    mupirocin  1 Application Nasal BID    aspirin  81 mg Oral Daily    pantoprazole  40 mg Intravenous QAM AC    Or    pantoprazole  40 mg Oral QAM AC      sodium chloride 10 mL/hr at 04/18/24 1100    DOBUTamine Stopped (04/20/24 0300)    nitroGLYCERIN in dextrose 5%      norepinephrine      NitroPRUSSide (Nipride) 50 mg in dextrose 5% 250 mL infusion      sodium chloride Stopped (04/21/24 0800)    HYDROmorphone in sodium chloride 0.9%      EPINEPHrine (Adrenalin) 5 mg in sodium chloride 0.9% 250 mL infusion Stopped (04/18/24 1610)         Fortino Pringle MD  4/22/2024  7:21 AM

## 2024-04-22 NOTE — OCCUPATIONAL THERAPY NOTE
OCCUPATIONAL THERAPY TREATMENT NOTE - INPATIENT     Room Number: 6600/6600-A  Session: 2  Number of Visits to Meet Established Goals: 3    Presenting Problem: s/p CABG x2      ASSESSMENT   Patient demonstrates excellent progress this session, goals  met .   Occupational Therapy will discharge patient at this time as all goals have been met at [facility]  .      History:  Patient is a 59 year old female admitted on 4/18/2024 with from home for scheduled surgery. Pt is s/p CABG x2 with Dr. Oquendo.      Co-Morbidities : MVCAD, HLD, anxiety, depression    WEIGHT BEARING RESTRICTION  Weight Bearing Restriction: None                Recommendations for nursing staff:   Transfers: Modified independent    Toileting location: toilet    TREATMENT SESSION:  Patient Start of Session: seated  FUNCTIONAL TRANSFER ASSESSMENT  Independent, chair  BED MOBILITY  Supine to Sit : Not tested  Sit to Supine (OT): Minimal Assist (for LE)  Scooting: Not tested    EDUCATION PROVIDED  Patient: Role of Occupational Therapy; Plan of Care; Functional Transfer Techniques; Surgical Precautions; Posture/Positioning; Compensatory ADL Techniques  Patient's Response to Education: Returned Demonstration; Verbalized Understanding      Equipment used: none  Therapist comments: Per pt, she has been performing post-CV surgery HEP independently. Educated the pt about UB/LB dressing sequencing and energy conservation principles. Independent with bra, shirt, and pants. Verbalized understanding about energy conservation principles.     Patient End of Session: Up in chair;Needs met;Call light within reach;RN aware of session/findings;All patient questions and concerns addressed      PAIN ASSESSMENT  Rating: Unable to rate  Location: discomfort in chest  Management Techniques: Body mechanics;Breathing techniques;Repositioning     OBJECTIVE  Precautions: Sternal;Cardiac    AM-PAC ‘6-Clicks’ Inpatient Daily Activity Short Form  -   Putting on and taking off  regular lower body clothing?: None  -   Bathing (including washing, rinsing, drying)?: None  -   Toileting, which includes using toilet, bedpan or urinal? : None  -   Putting on and taking off regular upper body clothing?: None  -   Taking care of personal grooming such as brushing teeth?: None  -   Eating meals?: None    AM-PAC Score:  Score: 24  Approx Degree of Impairment: 0%  Standardized Score (AM-PAC Scale): 57.54    PLAN  OT Treatment Plan: Balance activities;Energy conservation/work simplification techniques;ADL training;IADL training;Functional transfer training;UE strengthening/ROM;Endurance training;Patient/Family education;Patient/Family training;Equipment eval/education;Compensatory technique education  Rehab Potential : Good  Frequency: 3-5x/week    OT Goals:   ADL Goals - ongoing 4/20/24  Patient will perform grooming: with stand by assist and while standing at sink  Patient will perform upper body dressing:  with stand by assist  Patient will perform lower body dressing:  with stand by assist and with adaptive equipment PRN  Patient will perform toileting: with stand by assist     Functional Transfer Goals - progressing 4/24  Patient will transfer from sit to supine:  with stand by assist  Patient will transfer from supine to sit:  with stand by assist  Patient will transfer from sit to stand:  with stand by assist  Patient will transfer to toilet:  with supervision     UE Exercise Program Goal - goal met 4/24  Patient will be supervision with bilateral AROM HEP per CV binder (home exercise program).     Additional Goals - ongoing 4/24  Pt will verbalize 3 energy conservation strategies to be implemented into home routine  Pt will verbalize precautions and independently adhere to precautions during ADL tasks    OT Session Time: 24 minutes  Self-Care Home Management: 12 minutes  Therapeutic Activity: 8 minutes

## 2024-04-22 NOTE — PROGRESS NOTES
[facility]   part of [facility]     CV Surgery Progress Note    Hilda Bello Patient Status:  Inpatient    3/6/1965 MRN [id number]   Location [facility] 6NE-A Attending Gal Oquendo MD   Hosp Day # 4 PCP Syd Gomez MD     Subjective:  Patient reports feeling good, ready to go home. Pain is controlled. Denies any dyspnea. Ambulating well.       Objective:  /68 (BP Location: Right arm)   Pulse 86   Temp 97.9 °F (36.6 °C) (Temporal)   Resp 23   Ht 5' 7\" (1.702 m)   Wt 183 lb 3.2 oz (83.1 kg)   SpO2 96%   BMI 28.69 kg/m²     Intake/Output:    Intake/Output Summary (Last 24 hours) at 2024 0851  Last data filed at 2024 0800  Gross per 24 hour   Intake 920 ml   Output 2050 ml   Net -1130 ml       Labs:  Lab Results   Component Value Date    WBC 6.8 2024    RBC 2.78 2024    HGB 8.6 2024    HCT 25.6 2024    MCV 92.1 2024    MCH 30.9 2024    MCHC 33.6 2024    RDW 13.3 2024    .0 2024     Lab Results   Component Value Date     2024    K 3.3 2024     2024    CO2 27.0 2024    BUN 7 2024    CREATSERUM 0.70 2024     2024    CA 8.4 2024     Lab Results   Component Value Date    INR 1.39 (H) 2024    INR 1.58 (H) 2024    INR 0.98 04/15/2024       Physical Exam:  General: VSS, A&Ox3, In NAD  Neck: No JVD.   Lungs: clear anteriorly   Heart: S1,S2 RRR ; Sternum Stable   Abdomen: Soft, non-tender  Extremities: Warm, dry, no edema   Skin: sternotomy incision C/D/I, LE incision C/D/I  Neuro: no focal deficits     Assessment/Plan:   CAD s/p CABGx2 with LIMA-LAD, SVG-OM POD#4  -HD stable, off support   -Leukocytosis, afebrile, likely reactive, resolved- monitor   -Acute post op blood loss anemia, expected, stable- monitor   -TCP, likely 2/2 consumption, resolved- monitor   -Renal function intact, good UOP  -Bowel regimen   -Pain management, prn norco    -Chest tubes removed   -PW removed   -GI PPX: protonix  -DVT PPX: TEDs/SCDs  -Encourage IS/ambulation   -PT/OT/Cardiac rehab   -Ok to discharge home today from surgical standpoint if ok with other serivces      -D/W Dr. Oquendo/Jerrica Leblanc PA-C   Cardiothoracic Surgery   4/22/2024  8:52 AM

## 2024-04-22 NOTE — PLAN OF CARE
Assumed care of the pt at 0730, Neuro intact, remains anxious about all aspects of care, states she hasn't slept in days. Dr. Oquendo to bedside, states pt is able to go home. PT/OT to bedside, walked with pt on stairs and in clark, states she is ready to go home. Called Hosp and cardiac rehab regarding pt being discharged. [facility]CI MD to bedside as well.  No c/o any pain, will go home with Norco. IV taken out, as well as pacer wires, asking pt to stay in bed for 20 min after removal; pt very anxious to go home. Instructions gone over in detail, no further questions.  Cardiac Rehab RN here to see pt.

## 2024-04-22 NOTE — PHYSICAL THERAPY NOTE
PHYSICAL THERAPY TREATMENT NOTE - INPATIENT    Room Number: 6600/6600-A     Session: 2  Number of Visits to Meet Established Goals: 5    Presenting Problem: s/p  Coronary revascularization x2:  Left internal mammary artery graft to the left anterior descending, saphenous vein graft to the obtuse marginal coronary artery. 4/18  Co-Morbidities : MVCAD, HLD, anxiety, depression    ASSESSMENT   Patient demonstrates good  progress this session, goals  updated to reflect patient performance - all goals met    Patient continues to benefit from continued skilled PT services: at discharge to promote functional independence in home.  Anticipate patient will return home with home health PT.    PLAN  PT Treatment Plan: Bed mobility;Body mechanics;Coordination;Endurance;Energy conservation;Patient education;Family education;Gait training;Neuromuscular re-educate;Range of motion;Strengthening;Stoop training;Stair training;Transfer training;Balance training  Rehab Potential : Good  Frequency (Obs): 3-5x/week    CURRENT GOALS     Goal #1 Patient is able to demonstrate supine - sit EOB @ level: supervision     Goal #2 Patient is able to demonstrate transfers EOB to/from Chair/Wheelchair at assistance level: minimum assistance  MET      Goal #3 Patient is able to ambulate 150 feet with assist device: walker - rolling at assistance level: supervision  MET   Goal #4 Patient is able to navigate stairs with rail and SBA   Goal #5    Goal #6    Goal Comments: Goals established on 4/19/2024 4/22 all goals met     SUBJECTIVE  \" I made a chart for the exercises\"     OBJECTIVE  Precautions: Sternal;Cardiac    WEIGHT BEARING RESTRICTION  Weight Bearing Restriction: None                PAIN ASSESSMENT   Rating: Unable to rate  Location: sternal  Management Techniques: Activity promotion;Body mechanics;Repositioning    BALANCE                                                                                                                        Static Sitting: Good  Dynamic Sitting: Good           Static Standing: Good  Dynamic Standing: Good    ACTIVITY TOLERANCE  Pulse:  ( with activity)  Heart Rate Source: Monitor                   O2 WALK         AM-PAC '6-Clicks' INPATIENT SHORT FORM - BASIC MOBILITY  How much difficulty does the patient currently have...  Patient Difficulty: Turning over in bed (including adjusting bedclothes, sheets and blankets)?: None   Patient Difficulty: Sitting down on and standing up from a chair with arms (e.g., wheelchair, bedside commode, etc.): None   Patient Difficulty: Moving from lying on back to sitting on the side of the bed?: None   How much help from another person does the patient currently need...   Help from Another: Moving to and from a bed to a chair (including a wheelchair)?: None   Help from Another: Need to walk in hospital room?: None   Help from Another: Climbing 3-5 steps with a railing?: None       AM-PAC Score:  Raw Score: 24   Approx Degree of Impairment: 0%   Standardized Score (AM-PAC Scale): 61.14   CMS Modifier (G-Code): CH    FUNCTIONAL ABILITY STATUS  Gait Assessment   Functional Mobility/Gait Assessment  Gait Assistance: Independent  Distance (ft): 150  Assistive Device: None  Pattern: Within Functional Limits  Stairs: Stairs  How Many Stairs: 12  Device: 1 Rail  Assist: Modified independent  Pattern: Ascend and Descend  Ascend and Descend : Step to    Skilled Therapy Provided     Transfer Mobility:  Sit<>Stand: indep   Stand<>Sit: indep  Gait: indep - participated in stair training with cues on pattern and use of rail    Patient End of Session: Up in chair;Needs met;Call light within reach;With  staff;RN aware of session/findings;All patient questions and concerns addressed    PT Session Time: 8 minutes  Gait Trainin minutes

## 2024-04-23 ENCOUNTER — PATIENT OUTREACH (OUTPATIENT)
Dept: CASE MANAGEMENT | Age: 59
End: 2024-04-23

## 2024-04-23 DIAGNOSIS — Z02.9 ENCOUNTERS FOR ADMINISTRATIVE PURPOSE: ICD-10-CM

## 2024-04-23 DIAGNOSIS — I25.10 CAD IN NATIVE ARTERY: Primary | ICD-10-CM

## 2024-04-23 RX ORDER — EVOLOCUMAB 140 MG/ML
INJECTION, SOLUTION SUBCUTANEOUS
COMMUNITY

## 2024-04-23 NOTE — PROGRESS NOTES
Initial Post Discharge Follow Up   Discharge Date: 4/22/24  Contact Date: 4/23/2024    Consent Verification:  Assessment Completed With: Patient  HIPAA Verified?  Yes    Discharge Dx:   CAD    General:   How have you been since your discharge from the hospital?  I'm feeling pretty good. Just a little tired. I took my first shower today. I'm doing my exercises. Over all making progress. I did finally have a bm today.   Do you have any pain since discharge?  Yes  Where: back and incision.    Rating on pain scale 1-10, 10 being the worst pain you have ever experienced, what is current pain: \"I'm managing it with the Norco\".  Alleviating factors: none  Aggravating factors: none  Is the pain manageable at home? Yes  How well was your pain managed while in the hospital?   On a scale of 1-5   1- Very Poor and 5- Very well   Very Well  When you were leaving the hospital were your discharge instructions reviewed with you? Yes  How well were your discharge instructions explained to you?   On a scale of 1-5   1- Very Poor and 5- Very well   Very Well  Do you have any questions about your discharge instructions?  No  Before leaving the hospital was your diagnoses explained to you? Yes  Do you have any questions about your diagnoses? No  Are you able to perform normal daily activities of living as you have prior to your hospital stay (dressing, bathing, ambulating to the bathroom, etc)? yes  (MEGAN) Was patient given a different diet per AVS? no      Medications: patient states that she is also on Repatha sureclick, which was not on her list. She states that she will call cardiologist today to make sure it is still okay to take as her next dose is due this Saturday. MEGAN added this to patient's medications.   Current Outpatient Medications   Medication Sig Dispense Refill    HYDROcodone-acetaminophen 5-325 MG Oral Tab Take 1-2 tablets by mouth every 6 (six) hours as needed. 30 tablet 0    aspirin 81 MG Oral Tab EC Take 1 tablet (81  mg total) by mouth daily.      metoprolol succinate ER 25 MG Oral Tablet 24 Hr Take 1 tablet (25 mg total) by mouth daily. 30 tablet 11    sertraline 100 MG Oral Tab Take 1 tablet (100 mg total) by mouth daily. 90 tablet 1    ezetimibe 10 MG Oral Tab Take 1 tablet (10 mg total) by mouth nightly. 90 tablet 3     Were there any changes to your current medication(s) noted on the AVS? Yes  If so, were these medication changes discussed with you prior to leaving the hospital? Yes  If a new medication was prescribed:    Was the new medication's purpose & side effects reviewed? Yes  Do you have any questions about your new medication? No  Did you  your discharge medications when you left the hospital? Yes  Let's go over your medications together to make sure we are not missing anything. Medications Reviewed  Are there any reasons that keep you from taking your medication as prescribed? No  Are you having any concerns with constipation? No      Discharge medications reviewed/discussed/and reconciled against outpatient medications with patient.  Any changes or updates to medications sent to PCP.  Patient Acknowledged     Referrals/orders at D/C:  Referrals/orders placed at D/C? yes  What services:   Home health   (If HH was ordered) Has HH been set up?  No, pt agrees to return [facility] call tomorrow should she not hear from [facility] by then.      DME ordered at D/C? No      Discharge orders, AVS reviewed and discussed with patient. Any changes or updates to orders sent to PCP.  Patient Acknowledged      SDOH:   Transportation Needs: No Transportation Needs (4/18/2024)    Transportation Needs     Lack of Transportation: No     Financial Resource Strain: Low Risk  (4/23/2024)    Financial Resource Strain     Difficulty of Paying Living Expenses: Not hard at all     Med Affordability: No           Diagnosis specifics: CV Surgery:   Have there been any changes in your wound or incision?    no     Are you walking more and  more each day?    yes     Are you continuing to use your incentive spirometer?  yes    Appointments Scheduled:    PCP in one week:   yes   Cardiologist 2 weeks yes    Chest Xray 7 days post d/c yes      Follow up appointments:      Your appointments       Date & Time Appointment Department ([place])    May 01, 2024 12:30 PM CDT Hospital Follow Up with Scarlett Shukla APRN [facility], [address] [place] [address] ([facility] [address])        May 06, 2024 3:30 PM CDT XRAY CHEST DECUBITUS BILATERAL with EH XR RM6 GENERAL [facility] X-ray ([facility])        May 15, 2024 10:00 AM CDT Post Op with Cici Leblanc PA [facility] [place] ([facility])        May 16, 2024 11:00 AM CDT CARDIAC REHAB PHASE II INITIAL with MISAEL STACY INITIAL [facility] Cardiopulmonary Rehabilitation ([facility])    Check with Insurance for coverage                                      First visit 1 1/2 hours                                                                   Dress comfortably                                                                                                                                                      Cardiac Surgery KACY Mohan  [facility]  [facility]  [address]  [address]th [address]  [facility] [address]  [phone] [facility] Cardiopulmonary Rehabilitation  [facility]  [address] [facility] [address]  [phone] [facility] X-ray  [facility]  [address] [facility] [address]  [phone]    [facility], [address] [place] [address] [address]  [address] [place] [id number]  [phone]            TCC  Was TCC ordered: No      PCP (If no TCC appointment)  Does patient already have a PCP appointment scheduled?  Yes  NCM Confirmed PCP office TCM appointment with patient      Specialist    Does the patient have any other follow up appointment(s) needing to be scheduled? Yes  If yes: NCM reviewed upcoming specialist appointment with patient: Yes  Does the patient need assistance scheduling appointment(s): No, pt has all of her follow up appts scheduled already    Is there any reason as to why you cannot make your appointment(s)?  No     Needs post D/C:   Now that you are home, are there any needs or concerns you need addressed before your next visit with your PCP?  (DME, meds, questions, etc.): No    Interventions by NCM:   NCM reviewed discharge instructions and when to seek medical attention with the patient. She states that she is feeling fine. She states that her pain is controlled. NCM instructed on s/s of infection; she v/u and states that everything looks like it did yesterday at discharge. She does not check her bp. She states that she finally had a BM today. She denied having any fever, n/v/c/d, sob, lightheadedness, HA or any new or worsening symptoms. Med review completed. She denied having any questions or concerns at this time.         CCM referral placed:    No    BOOK BY DATE: 5/6/24

## 2024-04-24 NOTE — PROGRESS NOTES
MEGAN received VM from patient stating that she has not heard from [facility] yet. MEGAN called [facility]. MEGAN s/w Amy. She transferred MEGAN to Shana who stated that they were not aware that pt had made a choice on [facility]. MEGAN advised that pt did choose [facility]. She stated that they need clinical notes and AVS and then pt can be scheduled for SOC tomorrow. MEGAN called  Danii Conway, [id number], and  stating the above and requesting clinical note and AVS to be sent to [facility]. MEGAN also called main  line,   [phone], and s/w Marisol who stated that Danii is out of the office. She transferred MEGAN to Domi, [id number]. MEGAN s/w Domi and explained the above situation. She stated that she would take care of getting the order  and sending the requested information to [facility]. MEGAN called patient and informed her of this; she v/u.She also stated that she has not been able to sleep much at night due to feeling anxious and just uncomfortable. She states that she did have a BM but feels backed up still. She did start taking Miralax and will try colace. MEGAN advised hydration. She states that she will try taking her xanax tonight to help her sleep.

## 2024-04-24 NOTE — CM/SW NOTE
LATE ENTRY    Pt discharged with [facility]  P: [phone]  F: [phone]    AVS faxed to   Informed that patient seen this morning

## 2024-04-25 NOTE — DISCHARGE SUMMARY
[facility]  Discharge Summary    Hilda Bello Patient Status:  Inpatient    3/6/1965 MRN [id number]   Location [facility] 6NE-A Attending No att. providers found   Hosp Day # 4 PCP Syd Gomez MD     Admit date: 2024    Discharge date and time: 2024 12:01 PM     Discharge Diagnoses:   Coronary artery disease    Procedures Performed:   Coronary revascularization x2: Left internal mammary artery graft to the left anterior descending, saphenous vein graft to the obtuse marginal coronary artery.     HPI & Hospital Course: Hilda Bello is a 59 year old female with coronary artery disease who was admitted to the hospital for coronary revascularization. Surgery was performed and recovery was uneventful. For more detailed information please see the medical record.     Discharge Condition: Stable     Discharge Instructions:  Pt was instructed not to lift anything heavy and to follow up with Dr. Oquendo and Cardiology as directed.     Signed:  Gal Oquendo MD  2024

## 2024-04-26 ENCOUNTER — TELEPHONE (OUTPATIENT)
Dept: INTERNAL MEDICINE CLINIC | Facility: CLINIC | Age: 59
End: 2024-04-26

## 2024-04-26 ENCOUNTER — TELEPHONE (OUTPATIENT)
Dept: CARDIOLOGY UNIT | Facility: HOSPITAL | Age: 59
End: 2024-04-26

## 2024-04-26 NOTE — PROGRESS NOTES
Follow Up Phone Call    1. How are you doing now that you are home? C/O some anxiety    2. Have there been any changes in your wound/incision since going home? No    3. Is your pain manageable at home? Yes    4. Are you following the walking routine given to you in the hospital? Yes    5. Are you continuing to use your incentive spirometer? Yes    6. Do you have your appointments for Chest Xray?  5/6                                                              Primary MD?  Yes                                                              Cardiologist?  5/6                                                              Dr. Oquendo? 5/15                                                              Cardiac Rehab?  5/16    7. Do you have any other questions or concerns today? All questions and concerns addressed        Rashida CONTRERAS RN  4/26/2024  11:28 AM

## 2024-04-26 NOTE — TELEPHONE ENCOUNTER
Patient discharged from hospital on Monday 4/22. CABG on 4/18. Patient established care at office on 4/15. Upon review of her discharge medications she does not see her Repatha listed and wants to make sure it is okay she can resume her dosage tomorrow. Spoke to MATTHEW Pantoja and confirmed OK to resume dosage tomorrow. Patient verbalized understanding. F/U scheduled on 5/1.

## 2024-04-26 NOTE — TELEPHONE ENCOUNTER
Evolocumab (REPATHA SURECLICK) 140 MG/ML Subcutaneous Solution Auto-injector     Patient scheduled to take a dose tomorrow   And has a question since she was discharged from the hospital

## 2024-05-01 ENCOUNTER — LAB ENCOUNTER (OUTPATIENT)
Dept: LAB | Age: 59
End: 2024-05-01
Attending: NURSE PRACTITIONER
Payer: COMMERCIAL

## 2024-05-01 ENCOUNTER — OFFICE VISIT (OUTPATIENT)
Dept: INTERNAL MEDICINE CLINIC | Facility: CLINIC | Age: 59
End: 2024-05-01
Payer: COMMERCIAL

## 2024-05-01 VITALS
TEMPERATURE: 98 F | HEIGHT: 67 IN | BODY MASS INDEX: 26.68 KG/M2 | DIASTOLIC BLOOD PRESSURE: 72 MMHG | WEIGHT: 170 LBS | RESPIRATION RATE: 14 BRPM | OXYGEN SATURATION: 98 % | SYSTOLIC BLOOD PRESSURE: 126 MMHG | HEART RATE: 68 BPM

## 2024-05-01 DIAGNOSIS — D50.0 IRON DEFICIENCY ANEMIA DUE TO CHRONIC BLOOD LOSS: ICD-10-CM

## 2024-05-01 DIAGNOSIS — E78.2 MIXED HYPERLIPIDEMIA: ICD-10-CM

## 2024-05-01 DIAGNOSIS — E87.6 HYPOKALEMIA: ICD-10-CM

## 2024-05-01 DIAGNOSIS — I25.10 CAD IN NATIVE ARTERY: Primary | ICD-10-CM

## 2024-05-01 PROBLEM — E06.3 HASHIMOTO'S THYROIDITIS: Status: ACTIVE | Noted: 2024-02-06

## 2024-05-01 LAB
ANION GAP SERPL CALC-SCNC: 7 MMOL/L (ref 0–18)
BASOPHILS # BLD AUTO: 0.06 X10(3) UL (ref 0–0.2)
BASOPHILS NFR BLD AUTO: 0.6 %
BUN BLD-MCNC: 17 MG/DL (ref 9–23)
CALCIUM BLD-MCNC: 9.2 MG/DL (ref 8.5–10.1)
CHLORIDE SERPL-SCNC: 104 MMOL/L (ref 98–112)
CO2 SERPL-SCNC: 24 MMOL/L (ref 21–32)
CREAT BLD-MCNC: 0.78 MG/DL
EGFRCR SERPLBLD CKD-EPI 2021: 89 ML/MIN/1.73M2 (ref 60–?)
EOSINOPHIL # BLD AUTO: 0.19 X10(3) UL (ref 0–0.7)
EOSINOPHIL NFR BLD AUTO: 1.9 %
ERYTHROCYTE [DISTWIDTH] IN BLOOD BY AUTOMATED COUNT: 14.1 %
FASTING STATUS PATIENT QL REPORTED: NO
GLUCOSE BLD-MCNC: 104 MG/DL (ref 70–99)
HCT VFR BLD AUTO: 34.2 %
HGB BLD-MCNC: 11.5 G/DL
IMM GRANULOCYTES # BLD AUTO: 0.03 X10(3) UL (ref 0–1)
IMM GRANULOCYTES NFR BLD: 0.3 %
LYMPHOCYTES # BLD AUTO: 1.59 X10(3) UL (ref 1–4)
LYMPHOCYTES NFR BLD AUTO: 15.6 %
MCH RBC QN AUTO: 31 PG (ref 26–34)
MCHC RBC AUTO-ENTMCNC: 33.6 G/DL (ref 31–37)
MCV RBC AUTO: 92.2 FL
MONOCYTES # BLD AUTO: 1.08 X10(3) UL (ref 0.1–1)
MONOCYTES NFR BLD AUTO: 10.6 %
NEUTROPHILS # BLD AUTO: 7.27 X10 (3) UL (ref 1.5–7.7)
NEUTROPHILS # BLD AUTO: 7.27 X10(3) UL (ref 1.5–7.7)
NEUTROPHILS NFR BLD AUTO: 71 %
OSMOLALITY SERPL CALC.SUM OF ELEC: 282 MOSM/KG (ref 275–295)
PLATELET # BLD AUTO: 604 10(3)UL (ref 150–450)
POTASSIUM SERPL-SCNC: 4.1 MMOL/L (ref 3.5–5.1)
RBC # BLD AUTO: 3.71 X10(6)UL
SODIUM SERPL-SCNC: 135 MMOL/L (ref 136–145)
WBC # BLD AUTO: 10.2 X10(3) UL (ref 4–11)

## 2024-05-01 PROCEDURE — 80048 BASIC METABOLIC PNL TOTAL CA: CPT

## 2024-05-01 PROCEDURE — 36415 COLL VENOUS BLD VENIPUNCTURE: CPT

## 2024-05-01 PROCEDURE — 85025 COMPLETE CBC W/AUTO DIFF WBC: CPT

## 2024-05-01 PROCEDURE — 99495 TRANSJ CARE MGMT MOD F2F 14D: CPT | Performed by: NURSE PRACTITIONER

## 2024-05-01 RX ORDER — ALPRAZOLAM 0.25 MG/1
0.25 TABLET ORAL NIGHTLY PRN
COMMUNITY

## 2024-05-01 NOTE — PROGRESS NOTES
Subjective:   Hilda Bello is a 59 year old female who presents for hospital follow up.   She was discharged from [facility] to Home or Self Care  Admission Date: 4/18/24   Discharge Date: 4/22/24  Hospital Discharge Diagnosis: CAD, anemia d/t blood loss, thrombocytopenia, hyperlipidemia and anxiety    Interactive contact within 2 business days post discharge first initiated on Date: 4/23/2024    During the visit, the following was completed:  Obtained and reviewed discharge summary, continuity of care documents, Hospitalist notes, Cardiology notes, and Surgery Notes  Reviewed Labs (CBC, CMP), X-Ray radiology results, EKG, and Operative reports: Cardiac    HPI: CAD admitted for revascularization. She underwent bypass to LAD and left internal mammary artery. Surgery and recovery have gone without complication.    History/Other:   Current Medications:  Medication Reconciliation:  I am aware of an inpatient discharge within the last 30 days.  The discharge medication list has been reconciled with the patient's current medication list and reviewed by me.  See medication list for additions of new medication, and changes to current doses of medications and discontinued medications.  Outpatient Medications Marked as Taking for the 5/1/24 encounter (Office Visit) with Scarlett Shukla APRN   Medication Sig    ALPRAZolam 0.25 MG Oral Tab Take 1 tablet (0.25 mg total) by mouth nightly as needed.    Evolocumab (REPATHA SURECLICK) 140 MG/ML Subcutaneous Solution Auto-injector Inject into the skin.    aspirin 81 MG Oral Tab EC Take 1 tablet (81 mg total) by mouth daily.    metoprolol succinate ER 25 MG Oral Tablet 24 Hr Take 1 tablet (25 mg total) by mouth daily.    sertraline 100 MG Oral Tab Take 1 tablet (100 mg total) by mouth daily.    ezetimibe 10 MG Oral Tab Take 1 tablet (10 mg total) by mouth nightly.       Review of Systems:  GENERAL: weight stable, energy stable, no sweating  SKIN: denies any unusual skin  lesions  EYES: denies blurred vision or double vision  HEENT: denies nasal congestion, sinus pain or ST  LUNGS: denies shortness of breath with exertion  CARDIOVASCULAR: denies chest pain on exertion or palpitations  GI: denies abdominal pain, denies heartburn, denies diarrhea  MUSCULOSKELETAL: denies pain, normal range of motion of extremities  NEURO: denies headaches, denies dizziness, denies weakness  PSYCHE: denies depression, mild anxiety  HEMATOLOGIC:  hx of anemia, no bruising, denies bleeding  ENDOCRINE: denies thyroid history  ALL/ASTHMA: denies hx of allergy or asthma    Objective:   No LMP recorded. (Menstrual status: Menopause).  Estimated body mass index is 26.63 kg/m² as calculated from the following:    Height as of this encounter: 5' 7\" (1.702 m).    Weight as of this encounter: 170 lb (77.1 kg).   /72   Pulse 68   Temp 98.2 °F (36.8 °C)   Resp 14   Ht 5' 7\" (1.702 m)   Wt 170 lb (77.1 kg)   SpO2 98%   BMI 26.63 kg/m²    GENERAL: well developed, well nourished, in no apparent distress  SKIN: no rashes, no suspicious lesions, saphenous grafts left leg and sternotomy incisions are c,d,i  HEENT: atraumatic, normocephalic, ears and throat are clear  EYES: PERRLA, EOMI, conjunctiva are clear  NECK: supple, no adenopathy, no bruits  CHEST: mild chest tenderness  LUNGS: clear to auscultation  CARDIO: RRR without murmur  GI: good BS's, no masses, HSM or tenderness  MUSCULOSKELETAL: back is not tender, FROM of the extremities  EXTREMITIES: no cyanosis, clubbing or edema  NEURO: Oriented times three, cranial nerves are intact, motor and sensory are grossly intact    Assessment & Plan:   1. CAD in native artery (Primary)  2. Iron deficiency anemia due to chronic blood loss  -     CBC With Differential With Platelet; Future; Expected date: 05/01/2024  3. Hypokalemia  -     Basic Metabolic Panel (8); Future; Expected date: 05/01/2024  4. Mixed hyperlipidemia    Continue current medications, continue  home PT. Seeing cardiology next week along with repeat chest xray and CV surgery. Plan is to then start cardiac rehab.   Check CBC and BMP today       Return in about 6 weeks (around 6/12/2024) for Follow-up.      IMPROVE VTE Individual Risk Assessment          RISK                                                          Points  [  ] Previous VTE                                                3  [  ] Thrombophilia                                             2  [  ] Lower limb paralysis                                   2        (unable to hold up >15 seconds)    [  ] Current Cancer                                             2         (within 6 months)  [ x ] Immobilization > 24 hrs                              1  [  ] ICU/CCU stay > 24 hours                             1  [ x ] Age > 60                                                         1    IMPROVE VTE Score: 2   Levonox for dvt ppx   Hold till procedure is figured out in am

## 2024-05-06 ENCOUNTER — HOSPITAL ENCOUNTER (OUTPATIENT)
Dept: GENERAL RADIOLOGY | Facility: HOSPITAL | Age: 59
Discharge: HOME | End: 2024-05-06
Attending: THORACIC SURGERY (CARDIOTHORACIC VASCULAR SURGERY)

## 2024-05-06 DIAGNOSIS — J90 PLEURAL EFFUSION: ICD-10-CM

## 2024-05-06 PROCEDURE — 71048 X-RAY EXAM CHEST 4+ VIEWS: CPT | Performed by: THORACIC SURGERY (CARDIOTHORACIC VASCULAR SURGERY)

## 2024-05-06 RX ORDER — ERGOCALCIFEROL 1.25 MG/1
CAPSULE ORAL
Qty: 20 CAPSULE | Refills: 0 | OUTPATIENT
Start: 2024-05-06

## 2024-05-08 DIAGNOSIS — I25.10 CAD IN NATIVE ARTERY: ICD-10-CM

## 2024-05-08 DIAGNOSIS — I25.10 CORONARY ARTERY DISEASE INVOLVING NATIVE CORONARY ARTERY OF NATIVE HEART WITHOUT ANGINA PECTORIS: ICD-10-CM

## 2024-05-08 DIAGNOSIS — F41.9 ANXIETY: ICD-10-CM

## 2024-05-08 DIAGNOSIS — E78.2 MIXED HYPERLIPIDEMIA: Primary | ICD-10-CM

## 2024-05-08 RX ORDER — EVOLOCUMAB 140 MG/ML
INJECTION, SOLUTION SUBCUTANEOUS
Qty: 2.1 ML | Refills: 0 | Status: CANCELLED | OUTPATIENT
Start: 2024-05-08

## 2024-05-08 RX ORDER — ALPRAZOLAM 0.25 MG/1
0.25 TABLET ORAL NIGHTLY PRN
Qty: 30 TABLET | Refills: 2 | Status: SHIPPED | OUTPATIENT
Start: 2024-05-08

## 2024-05-08 NOTE — TELEPHONE ENCOUNTER
Called to verify dose on Repatha  Spoke with pt, does not have Rx available for Repatha at this time, will call back to give dosage and frequency  Pt wanted to get Xanax refilled at this time  Rx pended for approval

## 2024-05-09 ENCOUNTER — ORDER TRANSCRIPTION (OUTPATIENT)
Dept: CARDIAC REHAB | Facility: HOSPITAL | Age: 59
End: 2024-05-09

## 2024-05-09 DIAGNOSIS — Z95.1 S/P CABG (CORONARY ARTERY BYPASS GRAFT): Primary | ICD-10-CM

## 2024-05-16 ENCOUNTER — CARDPULM VISIT (OUTPATIENT)
Dept: CARDIAC REHAB | Facility: HOSPITAL | Age: 59
End: 2024-05-16
Attending: INTERNAL MEDICINE

## 2024-05-20 ENCOUNTER — CARDPULM VISIT (OUTPATIENT)
Dept: CARDIAC REHAB | Facility: HOSPITAL | Age: 59
End: 2024-05-20
Attending: INTERNAL MEDICINE

## 2024-05-20 PROCEDURE — 93798 PHYS/QHP OP CAR RHAB W/ECG: CPT

## 2024-05-22 ENCOUNTER — CARDPULM VISIT (OUTPATIENT)
Dept: CARDIAC REHAB | Facility: HOSPITAL | Age: 59
End: 2024-05-22
Attending: INTERNAL MEDICINE

## 2024-05-22 PROCEDURE — 93798 PHYS/QHP OP CAR RHAB W/ECG: CPT

## 2024-05-24 ENCOUNTER — CARDPULM VISIT (OUTPATIENT)
Dept: CARDIAC REHAB | Facility: HOSPITAL | Age: 59
End: 2024-05-24
Attending: INTERNAL MEDICINE

## 2024-05-24 PROCEDURE — 93798 PHYS/QHP OP CAR RHAB W/ECG: CPT

## 2024-05-29 ENCOUNTER — CARDPULM VISIT (OUTPATIENT)
Dept: CARDIAC REHAB | Facility: HOSPITAL | Age: 59
End: 2024-05-29
Attending: INTERNAL MEDICINE

## 2024-05-29 PROCEDURE — 93798 PHYS/QHP OP CAR RHAB W/ECG: CPT

## 2024-05-31 ENCOUNTER — CARDPULM VISIT (OUTPATIENT)
Dept: CARDIAC REHAB | Facility: HOSPITAL | Age: 59
End: 2024-05-31
Attending: INTERNAL MEDICINE

## 2024-05-31 PROCEDURE — 93798 PHYS/QHP OP CAR RHAB W/ECG: CPT

## 2024-06-03 ENCOUNTER — CARDPULM VISIT (OUTPATIENT)
Dept: CARDIAC REHAB | Facility: HOSPITAL | Age: 59
End: 2024-06-03
Attending: INTERNAL MEDICINE
Payer: COMMERCIAL

## 2024-06-03 PROCEDURE — 93798 PHYS/QHP OP CAR RHAB W/ECG: CPT

## 2024-06-05 ENCOUNTER — CARDPULM VISIT (OUTPATIENT)
Dept: CARDIAC REHAB | Facility: HOSPITAL | Age: 59
End: 2024-06-05
Attending: INTERNAL MEDICINE
Payer: COMMERCIAL

## 2024-06-05 PROCEDURE — 93798 PHYS/QHP OP CAR RHAB W/ECG: CPT

## 2024-06-07 ENCOUNTER — CARDPULM VISIT (OUTPATIENT)
Dept: CARDIAC REHAB | Facility: HOSPITAL | Age: 59
End: 2024-06-07
Attending: INTERNAL MEDICINE
Payer: COMMERCIAL

## 2024-06-07 PROCEDURE — 93798 PHYS/QHP OP CAR RHAB W/ECG: CPT

## 2024-06-10 ENCOUNTER — APPOINTMENT (OUTPATIENT)
Dept: CARDIAC REHAB | Facility: HOSPITAL | Age: 59
End: 2024-06-10
Attending: INTERNAL MEDICINE
Payer: COMMERCIAL

## 2024-06-10 PROCEDURE — 93798 PHYS/QHP OP CAR RHAB W/ECG: CPT

## 2024-06-12 ENCOUNTER — CARDPULM VISIT (OUTPATIENT)
Dept: CARDIAC REHAB | Facility: HOSPITAL | Age: 59
End: 2024-06-12
Attending: INTERNAL MEDICINE
Payer: COMMERCIAL

## 2024-06-12 PROCEDURE — 93798 PHYS/QHP OP CAR RHAB W/ECG: CPT

## 2024-06-14 ENCOUNTER — OFFICE VISIT (OUTPATIENT)
Dept: INTERNAL MEDICINE CLINIC | Facility: CLINIC | Age: 59
End: 2024-06-14
Payer: COMMERCIAL

## 2024-06-14 VITALS
SYSTOLIC BLOOD PRESSURE: 114 MMHG | HEIGHT: 67 IN | BODY MASS INDEX: 27.15 KG/M2 | WEIGHT: 173 LBS | TEMPERATURE: 97 F | RESPIRATION RATE: 18 BRPM | OXYGEN SATURATION: 97 % | DIASTOLIC BLOOD PRESSURE: 78 MMHG | HEART RATE: 102 BPM

## 2024-06-14 DIAGNOSIS — I25.10 CAD IN NATIVE ARTERY: ICD-10-CM

## 2024-06-14 DIAGNOSIS — Z12.31 ENCOUNTER FOR SCREENING MAMMOGRAM FOR MALIGNANT NEOPLASM OF BREAST: ICD-10-CM

## 2024-06-14 DIAGNOSIS — S46.811A STRAIN OF RIGHT TRAPEZIUS MUSCLE, INITIAL ENCOUNTER: Primary | ICD-10-CM

## 2024-06-14 PROCEDURE — 99214 OFFICE O/P EST MOD 30 MIN: CPT | Performed by: NURSE PRACTITIONER

## 2024-06-14 PROCEDURE — G2211 COMPLEX E/M VISIT ADD ON: HCPCS | Performed by: NURSE PRACTITIONER

## 2024-06-14 RX ORDER — PREDNISONE 20 MG/1
40 TABLET ORAL DAILY
Qty: 14 TABLET | Refills: 0 | Status: SHIPPED | OUTPATIENT
Start: 2024-06-14 | End: 2024-06-21

## 2024-06-14 RX ORDER — CYCLOBENZAPRINE HCL 10 MG
10 TABLET ORAL 3 TIMES DAILY PRN
Qty: 20 TABLET | Refills: 0 | Status: SHIPPED | OUTPATIENT
Start: 2024-06-14

## 2024-06-14 NOTE — PROGRESS NOTES
HPI:    Patient ID: Hilda Bello is a 59 year old female.    Chief Complaint   Patient presents with    Muscle Pain     Muscle pain in upper right shoulder that radiates to upper neck area x4 days       Pain started 4 days ago in right upper back that is radiating to her neck and into the collar bone. She has used tylenol with mild improvement. She was setting up her umbrella in the backyard earlier in the week prior to pain starting. She has been doing well in cardiac rehab but had to cancel today bc of pain. Saw Dr. Schumacher yesterday and metoprolol was decreased. HR has been running higher into low 100's prior to metoprolol dose cut. She does rate pain high 10/10 yesterday.         Review of Systems   Constitutional: Negative.    Respiratory: Negative.  Negative for shortness of breath.    Cardiovascular: Negative.  Negative for chest pain, palpitations and leg swelling.   Gastrointestinal: Negative.    Musculoskeletal:  Positive for myalgias and neck pain.   Neurological: Negative.          Past Medical History:    Anxiety    Anxiety state    Arthritis    Bloating    Coronary atherosclerosis    Depression    Disorder of thyroid    Fatigue    Headache disorder    High cholesterol    Hyperlipidemia    Migraines    Night sweats    Pain in joints    Stress    Visual impairment    CONTACTS    Wears glasses    Weight gain     Past Surgical History:   Procedure Laterality Date    Lily biopsy stereo nodule 1 site left (cpt=19081) Left 2020    Lily localization wire 1 site left (cpt=19281) Left 2021    ADH      2002    Other      Dilation and curettage    [place] teeth removed      X4     Family History   Problem Relation Age of Onset    Hypertension Father     Cancer Mother         multiple myeloma    Ovarian Cancer Sister 58     Social History     Socioeconomic History    Marital status:    Tobacco Use    Smoking status: Former     Current packs/day: 0.00     Average packs/day: 0.5 packs/day for  3.0 years (1.5 ttl pk-yrs)     Types: Cigarettes     Start date: 1985     Quit date: 1988     Years since quittin.0    Smokeless tobacco: Never   Vaping Use    Vaping status: Never Used   Substance and Sexual Activity    Alcohol use: Yes     Alcohol/week: 0.0 - 3.0 standard drinks of alcohol     Comment: occ    Drug use: Yes     Comment: thc gummy   Other Topics Concern    Caffeine Concern No    Stress Concern Yes    Weight Concern No    Special Diet No    Exercise No    Seat Belt Yes          Current Outpatient Medications   Medication Sig Dispense Refill    predniSONE 20 MG Oral Tab Take 2 tablets (40 mg total) by mouth daily for 7 days. 14 tablet 0    cyclobenzaprine 10 MG Oral Tab Take 1 tablet (10 mg total) by mouth 3 (three) times daily as needed for Muscle spasms. 20 tablet 0    ALPRAZolam 0.25 MG Oral Tab Take 1 tablet (0.25 mg total) by mouth nightly as needed. 30 tablet 2    Evolocumab (REPATHA SURECLICK) 140 MG/ML Subcutaneous Solution Auto-injector Inject into the skin.      aspirin 81 MG Oral Tab EC Take 1 tablet (81 mg total) by mouth daily.      metoprolol succinate ER 25 MG Oral Tablet 24 Hr Take 1 tablet (25 mg total) by mouth daily. (Patient taking differently: Take 1 tablet (25 mg total) by mouth daily. Patient reports taking 12.5mg as of 24) 30 tablet 11    sertraline 100 MG Oral Tab Take 1 tablet (100 mg total) by mouth daily. 90 tablet 1    ezetimibe 10 MG Oral Tab Take 1 tablet (10 mg total) by mouth nightly. 90 tablet 3     Allergies:  Allergies   Allergen Reactions    Statins MYALGIA       Lab Results   Component Value Date     (H) 2024    BUN 17 2024    BUNCREA 18.7 2021    CREATSERUM 0.78 2024    ANIONGAP 7 2024     2018    GFRNAA 85 04/15/2022    GFRAA 97 04/15/2022    CA 9.2 2024    OSMOCALC 282 2024    ALKPHO 78 04/15/2024    AST 10 (L) 04/15/2024    ALT 22 04/15/2024    BILT 0.6 04/15/2024    TP 8.1  04/15/2024    ALB 4.1 04/15/2024    GLOBULIN 4.0 04/15/2024     (L) 05/01/2024    K 4.1 05/01/2024     05/01/2024    CO2 24.0 05/01/2024     Lab Results   Component Value Date    WBC 10.2 05/01/2024    RBC 3.71 (L) 05/01/2024    HGB 11.5 (L) 05/01/2024    HCT 34.2 (L) 05/01/2024    MCV 92.2 05/01/2024    MCH 31.0 05/01/2024    MCHC 33.6 05/01/2024    RDW 14.1 05/01/2024    .0 (H) 05/01/2024     Lab Results   Component Value Date    CHOLEST 211 (H) 04/08/2024    TRIG 66 04/08/2024    HDL 72 (H) 04/08/2024     (H) 04/08/2024    VLDL 12 04/08/2024    TCHDLRATIO 3.00 02/01/2018    NONHDLC 139 (H) 04/08/2024     Lab Results   Component Value Date     04/15/2024    A1C 5.8 (H) 04/15/2024     Lab Results   Component Value Date    T4F 0.8 04/08/2024    TSH 5.500 (H) 04/08/2024     Lab Results   Component Value Date    VITD 95.0 04/08/2024     Lab Results   Component Value Date    ANUJA 51.8 12/26/2023     No results found for: \"FOLIC\", \"FOLATESER\", \"FOLATE\"  Lab Results   Component Value Date    IRON 110 04/03/2024     Lab Results   Component Value Date    B12 529 12/26/2023     No results found for: \"PHOS\", \"PHOSPHORUS\"  Lab Results   Component Value Date    MG 1.9 04/21/2024        PHYSICAL EXAM:   /78   Pulse 102   Temp 97.1 °F (36.2 °C) (Temporal)   Resp 18   Ht 5' 7\" (1.702 m)   Wt 173 lb (78.5 kg)   SpO2 97%   BMI 27.10 kg/m²   Physical Exam  Vitals and nursing note reviewed.   Constitutional:       Appearance: Normal appearance.   Neck:     Musculoskeletal:      Cervical back: Edema present. Muscular tenderness present.   Neurological:      Mental Status: She is alert.              ASSESSMENT/PLAN:   Diagnoses and all orders for this visit:    Strain of right trapezius muscle, initial encounter  -     predniSONE 20 MG Oral Tab; Take 2 tablets (40 mg total) by mouth daily for 7 days.  -     cyclobenzaprine 10 MG Oral Tab; Take 1 tablet (10 mg total) by mouth 3 (three) times  daily as needed for Muscle spasms.    Encounter for screening mammogram for malignant neoplasm of breast  -     [facility] LILIA 2D+3D SCREENING BILAT (CPT=77067/41046); Future    CAD in native artery- discussed waiting on going on work trip d/t recent bypass and now trapezius muscle issue. Use heat to area, light massage, muscle relaxer and prednisone.       Scarlett Shukla, APRN

## 2024-06-17 ENCOUNTER — CARDPULM VISIT (OUTPATIENT)
Dept: CARDIAC REHAB | Facility: HOSPITAL | Age: 59
End: 2024-06-17
Attending: INTERNAL MEDICINE
Payer: COMMERCIAL

## 2024-06-17 PROCEDURE — 93798 PHYS/QHP OP CAR RHAB W/ECG: CPT

## 2024-06-19 ENCOUNTER — CARDPULM VISIT (OUTPATIENT)
Dept: CARDIAC REHAB | Facility: HOSPITAL | Age: 59
End: 2024-06-19
Attending: INTERNAL MEDICINE
Payer: COMMERCIAL

## 2024-06-19 PROCEDURE — 93798 PHYS/QHP OP CAR RHAB W/ECG: CPT

## 2024-06-21 ENCOUNTER — CARDPULM VISIT (OUTPATIENT)
Dept: CARDIAC REHAB | Facility: HOSPITAL | Age: 59
End: 2024-06-21
Attending: INTERNAL MEDICINE
Payer: COMMERCIAL

## 2024-06-21 PROCEDURE — 93798 PHYS/QHP OP CAR RHAB W/ECG: CPT

## 2024-06-24 ENCOUNTER — APPOINTMENT (OUTPATIENT)
Dept: CARDIAC REHAB | Facility: HOSPITAL | Age: 59
End: 2024-06-24
Attending: INTERNAL MEDICINE
Payer: COMMERCIAL

## 2024-06-26 ENCOUNTER — APPOINTMENT (OUTPATIENT)
Dept: CARDIAC REHAB | Facility: HOSPITAL | Age: 59
End: 2024-06-26
Attending: INTERNAL MEDICINE
Payer: COMMERCIAL

## 2024-06-28 ENCOUNTER — CARDPULM VISIT (OUTPATIENT)
Dept: CARDIAC REHAB | Facility: HOSPITAL | Age: 59
End: 2024-06-28
Attending: INTERNAL MEDICINE
Payer: COMMERCIAL

## 2024-06-28 PROCEDURE — 93798 PHYS/QHP OP CAR RHAB W/ECG: CPT

## 2024-07-01 ENCOUNTER — CARDPULM VISIT (OUTPATIENT)
Dept: CARDIAC REHAB | Facility: HOSPITAL | Age: 59
End: 2024-07-01
Attending: INTERNAL MEDICINE
Payer: COMMERCIAL

## 2024-07-01 PROCEDURE — 93798 PHYS/QHP OP CAR RHAB W/ECG: CPT

## 2024-07-08 ENCOUNTER — APPOINTMENT (OUTPATIENT)
Dept: CARDIAC REHAB | Facility: HOSPITAL | Age: 59
End: 2024-07-08
Attending: INTERNAL MEDICINE
Payer: COMMERCIAL

## 2024-07-08 PROCEDURE — 93798 PHYS/QHP OP CAR RHAB W/ECG: CPT

## 2024-07-10 ENCOUNTER — CARDPULM VISIT (OUTPATIENT)
Dept: CARDIAC REHAB | Facility: HOSPITAL | Age: 59
End: 2024-07-10
Attending: INTERNAL MEDICINE
Payer: COMMERCIAL

## 2024-07-10 PROCEDURE — 93798 PHYS/QHP OP CAR RHAB W/ECG: CPT

## 2024-07-11 DIAGNOSIS — E78.00 PURE HYPERCHOLESTEROLEMIA: ICD-10-CM

## 2024-07-11 RX ORDER — EVOLOCUMAB 140 MG/ML
1 INJECTION, SOLUTION SUBCUTANEOUS
Qty: 2 ML | Refills: 5 | Status: SHIPPED | OUTPATIENT
Start: 2024-07-11

## 2024-07-11 NOTE — TELEPHONE ENCOUNTER
Spoke with pharmacy, needed PA completed  [phone]  ID#[id number]  Case ID#[id number]    PA completed over the phone, requested expedited service  Will take 72 hours for approval

## 2024-07-11 NOTE — TELEPHONE ENCOUNTER
Pls call regarding pt's Evolocumab (REPATHA SURECLICK) 140 MG/ML script - PA needs to be done through cover my meds.  Pt needs this asap.  Pt called Dr. Huang's office (PA request received at [facility] yesterday - delivered by hand to PSR at [facility]).  She is currently an [facility] pt.  Pt asking to speak with nurse

## 2024-07-11 NOTE — TELEPHONE ENCOUNTER
Spoke with pt, she was no able to tolerate Statin due to severe joint pain, pt failed simvastatin and atorvastatin  Pt then started Zetia and Repatha which brought her cholesterol levels down significantly    Rx pended for approval  See myc message from 5/8/24, naseem Pantoja to send Rx for Repatha, previously refilled by [facility]

## 2024-07-12 ENCOUNTER — APPOINTMENT (OUTPATIENT)
Dept: CARDIAC REHAB | Facility: HOSPITAL | Age: 59
End: 2024-07-12
Attending: INTERNAL MEDICINE
Payer: COMMERCIAL

## 2024-07-15 ENCOUNTER — CARDPULM VISIT (OUTPATIENT)
Dept: CARDIAC REHAB | Facility: HOSPITAL | Age: 59
End: 2024-07-15
Attending: INTERNAL MEDICINE
Payer: COMMERCIAL

## 2024-07-15 PROCEDURE — 93798 PHYS/QHP OP CAR RHAB W/ECG: CPT

## 2024-07-17 ENCOUNTER — CARDPULM VISIT (OUTPATIENT)
Dept: CARDIAC REHAB | Facility: HOSPITAL | Age: 59
End: 2024-07-17
Attending: INTERNAL MEDICINE
Payer: COMMERCIAL

## 2024-07-17 PROCEDURE — 93798 PHYS/QHP OP CAR RHAB W/ECG: CPT

## 2024-07-22 ENCOUNTER — CARDPULM VISIT (OUTPATIENT)
Dept: CARDIAC REHAB | Facility: HOSPITAL | Age: 59
End: 2024-07-22
Attending: INTERNAL MEDICINE
Payer: COMMERCIAL

## 2024-07-22 PROCEDURE — 93798 PHYS/QHP OP CAR RHAB W/ECG: CPT

## 2024-07-29 ENCOUNTER — APPOINTMENT (OUTPATIENT)
Dept: CARDIAC REHAB | Facility: HOSPITAL | Age: 59
End: 2024-07-29
Attending: INTERNAL MEDICINE
Payer: COMMERCIAL

## 2024-07-31 ENCOUNTER — LAB ENCOUNTER (OUTPATIENT)
Dept: LAB | Age: 59
End: 2024-07-31
Attending: NURSE PRACTITIONER
Payer: COMMERCIAL

## 2024-07-31 ENCOUNTER — CARDPULM VISIT (OUTPATIENT)
Dept: CARDIAC REHAB | Facility: HOSPITAL | Age: 59
End: 2024-07-31
Attending: INTERNAL MEDICINE
Payer: COMMERCIAL

## 2024-07-31 ENCOUNTER — HOSPITAL ENCOUNTER (OUTPATIENT)
Dept: MAMMOGRAPHY | Age: 59
Discharge: HOME | End: 2024-07-31
Attending: NURSE PRACTITIONER
Payer: COMMERCIAL

## 2024-07-31 DIAGNOSIS — Z95.1 S/P CABG X 2: ICD-10-CM

## 2024-07-31 DIAGNOSIS — Z12.31 ENCOUNTER FOR SCREENING MAMMOGRAM FOR MALIGNANT NEOPLASM OF BREAST: ICD-10-CM

## 2024-07-31 DIAGNOSIS — E78.5 HYPERLIPIDEMIA: ICD-10-CM

## 2024-07-31 DIAGNOSIS — E78.00 HYPERCHOLESTEREMIA: Primary | ICD-10-CM

## 2024-07-31 LAB
ALBUMIN SERPL-MCNC: 4.7 G/DL (ref 3.2–4.8)
ALBUMIN/GLOB SERPL: 1.6 {RATIO} (ref 1–2)
ALP LIVER SERPL-CCNC: 78 U/L
ALT SERPL-CCNC: 14 U/L
ANION GAP SERPL CALC-SCNC: 4 MMOL/L (ref 0–18)
AST SERPL-CCNC: 17 U/L (ref ?–34)
BASOPHILS # BLD AUTO: 0.07 X10(3) UL (ref 0–0.2)
BASOPHILS NFR BLD AUTO: 0.7 %
BILIRUB SERPL-MCNC: 0.8 MG/DL (ref 0.3–1.2)
BUN BLD-MCNC: 18 MG/DL (ref 9–23)
CALCIUM BLD-MCNC: 9.9 MG/DL (ref 8.7–10.4)
CHLORIDE SERPL-SCNC: 102 MMOL/L (ref 98–112)
CHOLEST SERPL-MCNC: 186 MG/DL (ref ?–200)
CO2 SERPL-SCNC: 30 MMOL/L (ref 21–32)
CREAT BLD-MCNC: 0.85 MG/DL
EGFRCR SERPLBLD CKD-EPI 2021: 80 ML/MIN/1.73M2 (ref 60–?)
EOSINOPHIL # BLD AUTO: 0.15 X10(3) UL (ref 0–0.7)
EOSINOPHIL NFR BLD AUTO: 1.5 %
ERYTHROCYTE [DISTWIDTH] IN BLOOD BY AUTOMATED COUNT: 14.7 %
FASTING PATIENT LIPID ANSWER: YES
FASTING STATUS PATIENT QL REPORTED: YES
GLOBULIN PLAS-MCNC: 3 G/DL (ref 2–3.5)
GLUCOSE BLD-MCNC: 89 MG/DL (ref 70–99)
HCT VFR BLD AUTO: 42.4 %
HDLC SERPL-MCNC: 55 MG/DL (ref 40–59)
HGB BLD-MCNC: 13.8 G/DL
IMM GRANULOCYTES # BLD AUTO: 0.04 X10(3) UL (ref 0–1)
IMM GRANULOCYTES NFR BLD: 0.4 %
LDLC SERPL CALC-MCNC: 111 MG/DL (ref ?–100)
LYMPHOCYTES # BLD AUTO: 1.16 X10(3) UL (ref 1–4)
LYMPHOCYTES NFR BLD AUTO: 11.8 %
MCH RBC QN AUTO: 29.8 PG (ref 26–34)
MCHC RBC AUTO-ENTMCNC: 32.5 G/DL (ref 31–37)
MCV RBC AUTO: 91.6 FL
MONOCYTES # BLD AUTO: 0.85 X10(3) UL (ref 0.1–1)
MONOCYTES NFR BLD AUTO: 8.7 %
NEUTROPHILS # BLD AUTO: 7.55 X10 (3) UL (ref 1.5–7.7)
NEUTROPHILS # BLD AUTO: 7.55 X10(3) UL (ref 1.5–7.7)
NEUTROPHILS NFR BLD AUTO: 76.9 %
NONHDLC SERPL-MCNC: 131 MG/DL (ref ?–130)
OSMOLALITY SERPL CALC.SUM OF ELEC: 283 MOSM/KG (ref 275–295)
PLATELET # BLD AUTO: 305 10(3)UL (ref 150–450)
POTASSIUM SERPL-SCNC: 4.5 MMOL/L (ref 3.5–5.1)
PROT SERPL-MCNC: 7.7 G/DL (ref 5.7–8.2)
RBC # BLD AUTO: 4.63 X10(6)UL
SODIUM SERPL-SCNC: 136 MMOL/L (ref 136–145)
TRIGL SERPL-MCNC: 113 MG/DL (ref 30–149)
TSI SER-ACNC: 4.16 MIU/ML (ref 0.55–4.78)
VIT D+METAB SERPL-MCNC: 41.8 NG/ML (ref 30–100)
VLDLC SERPL CALC-MCNC: 19 MG/DL (ref 0–30)
WBC # BLD AUTO: 9.8 X10(3) UL (ref 4–11)

## 2024-07-31 PROCEDURE — 77063 BREAST TOMOSYNTHESIS BI: CPT | Performed by: NURSE PRACTITIONER

## 2024-07-31 PROCEDURE — 82306 VITAMIN D 25 HYDROXY: CPT

## 2024-07-31 PROCEDURE — 93798 PHYS/QHP OP CAR RHAB W/ECG: CPT

## 2024-07-31 PROCEDURE — 77067 SCR MAMMO BI INCL CAD: CPT | Performed by: NURSE PRACTITIONER

## 2024-07-31 PROCEDURE — 36415 COLL VENOUS BLD VENIPUNCTURE: CPT

## 2024-07-31 PROCEDURE — 84443 ASSAY THYROID STIM HORMONE: CPT

## 2024-07-31 PROCEDURE — 80053 COMPREHEN METABOLIC PANEL: CPT

## 2024-07-31 PROCEDURE — 80061 LIPID PANEL: CPT

## 2024-07-31 PROCEDURE — 85025 COMPLETE CBC W/AUTO DIFF WBC: CPT

## 2024-08-02 ENCOUNTER — CARDPULM VISIT (OUTPATIENT)
Dept: CARDIAC REHAB | Facility: HOSPITAL | Age: 59
End: 2024-08-02
Attending: INTERNAL MEDICINE
Payer: COMMERCIAL

## 2024-08-02 PROCEDURE — 93798 PHYS/QHP OP CAR RHAB W/ECG: CPT

## 2024-08-05 ENCOUNTER — CARDPULM VISIT (OUTPATIENT)
Dept: CARDIAC REHAB | Facility: HOSPITAL | Age: 59
End: 2024-08-05
Attending: INTERNAL MEDICINE
Payer: COMMERCIAL

## 2024-08-05 PROCEDURE — 93798 PHYS/QHP OP CAR RHAB W/ECG: CPT

## 2024-08-07 ENCOUNTER — CARDPULM VISIT (OUTPATIENT)
Dept: CARDIAC REHAB | Facility: HOSPITAL | Age: 59
End: 2024-08-07
Attending: INTERNAL MEDICINE
Payer: COMMERCIAL

## 2024-08-07 PROCEDURE — 93798 PHYS/QHP OP CAR RHAB W/ECG: CPT

## 2024-08-09 ENCOUNTER — APPOINTMENT (OUTPATIENT)
Dept: CARDIAC REHAB | Facility: HOSPITAL | Age: 59
End: 2024-08-09
Attending: INTERNAL MEDICINE
Payer: COMMERCIAL

## 2024-08-12 ENCOUNTER — APPOINTMENT (OUTPATIENT)
Dept: CARDIAC REHAB | Facility: HOSPITAL | Age: 59
End: 2024-08-12
Attending: INTERNAL MEDICINE
Payer: COMMERCIAL

## 2024-08-14 ENCOUNTER — CARDPULM VISIT (OUTPATIENT)
Dept: CARDIAC REHAB | Facility: HOSPITAL | Age: 59
End: 2024-08-14
Attending: INTERNAL MEDICINE
Payer: COMMERCIAL

## 2024-08-14 ENCOUNTER — TELEPHONE (OUTPATIENT)
Dept: INTERNAL MEDICINE CLINIC | Facility: CLINIC | Age: 59
End: 2024-08-14

## 2024-08-14 PROCEDURE — 93798 PHYS/QHP OP CAR RHAB W/ECG: CPT

## 2024-08-14 NOTE — TELEPHONE ENCOUNTER
Patient had her mammogram done 08.01.2024 and would like an order..   Report (08.01.2024) states needs addition screening because of dense breast

## 2024-08-19 ENCOUNTER — CARDPULM VISIT (OUTPATIENT)
Dept: CARDIAC REHAB | Facility: HOSPITAL | Age: 59
End: 2024-08-19
Attending: INTERNAL MEDICINE
Payer: COMMERCIAL

## 2024-08-21 ENCOUNTER — CARDPULM VISIT (OUTPATIENT)
Dept: CARDIAC REHAB | Facility: HOSPITAL | Age: 59
End: 2024-08-21
Attending: INTERNAL MEDICINE
Payer: COMMERCIAL

## 2024-08-21 PROCEDURE — 93798 PHYS/QHP OP CAR RHAB W/ECG: CPT

## 2024-08-23 ENCOUNTER — CARDPULM VISIT (OUTPATIENT)
Dept: CARDIAC REHAB | Facility: HOSPITAL | Age: 59
End: 2024-08-23
Attending: INTERNAL MEDICINE
Payer: COMMERCIAL

## 2024-08-23 PROCEDURE — 93798 PHYS/QHP OP CAR RHAB W/ECG: CPT

## 2024-08-28 ENCOUNTER — CARDPULM VISIT (OUTPATIENT)
Dept: CARDIAC REHAB | Facility: HOSPITAL | Age: 59
End: 2024-08-28
Attending: INTERNAL MEDICINE
Payer: COMMERCIAL

## 2024-08-28 DIAGNOSIS — F41.9 ANXIETY AND DEPRESSION: ICD-10-CM

## 2024-08-28 DIAGNOSIS — F32.A ANXIETY AND DEPRESSION: ICD-10-CM

## 2024-08-28 PROCEDURE — 93798 PHYS/QHP OP CAR RHAB W/ECG: CPT

## 2024-08-28 RX ORDER — SERTRALINE HYDROCHLORIDE 100 MG/1
100 TABLET, FILM COATED ORAL DAILY
Qty: 90 TABLET | Refills: 1 | Status: SHIPPED | OUTPATIENT
Start: 2024-08-28

## 2024-08-30 ENCOUNTER — CARDPULM VISIT (OUTPATIENT)
Dept: CARDIAC REHAB | Facility: HOSPITAL | Age: 59
End: 2024-08-30
Attending: INTERNAL MEDICINE
Payer: COMMERCIAL

## 2024-08-30 PROCEDURE — 93798 PHYS/QHP OP CAR RHAB W/ECG: CPT

## 2024-09-04 ENCOUNTER — CARDPULM VISIT (OUTPATIENT)
Dept: CARDIAC REHAB | Facility: HOSPITAL | Age: 59
End: 2024-09-04
Attending: INTERNAL MEDICINE
Payer: COMMERCIAL

## 2024-09-04 PROCEDURE — 93798 PHYS/QHP OP CAR RHAB W/ECG: CPT

## 2024-09-06 ENCOUNTER — CARDPULM VISIT (OUTPATIENT)
Dept: CARDIAC REHAB | Facility: HOSPITAL | Age: 59
End: 2024-09-06
Attending: INTERNAL MEDICINE
Payer: COMMERCIAL

## 2024-09-06 PROCEDURE — 93798 PHYS/QHP OP CAR RHAB W/ECG: CPT

## 2024-09-09 ENCOUNTER — CARDPULM VISIT (OUTPATIENT)
Dept: CARDIAC REHAB | Facility: HOSPITAL | Age: 59
End: 2024-09-09
Attending: INTERNAL MEDICINE
Payer: COMMERCIAL

## 2024-09-09 PROCEDURE — 93798 PHYS/QHP OP CAR RHAB W/ECG: CPT

## 2024-09-11 ENCOUNTER — CARDPULM VISIT (OUTPATIENT)
Dept: CARDIAC REHAB | Facility: HOSPITAL | Age: 59
End: 2024-09-11
Attending: INTERNAL MEDICINE
Payer: COMMERCIAL

## 2024-09-11 PROCEDURE — 93798 PHYS/QHP OP CAR RHAB W/ECG: CPT

## 2024-09-13 ENCOUNTER — CARDPULM VISIT (OUTPATIENT)
Dept: CARDIAC REHAB | Facility: HOSPITAL | Age: 59
End: 2024-09-13
Attending: INTERNAL MEDICINE
Payer: COMMERCIAL

## 2024-09-13 PROCEDURE — 93798 PHYS/QHP OP CAR RHAB W/ECG: CPT

## 2024-09-16 ENCOUNTER — CARDPULM VISIT (OUTPATIENT)
Dept: CARDIAC REHAB | Facility: HOSPITAL | Age: 59
End: 2024-09-16
Attending: INTERNAL MEDICINE
Payer: COMMERCIAL

## 2024-09-16 PROCEDURE — 93798 PHYS/QHP OP CAR RHAB W/ECG: CPT

## 2024-09-18 ENCOUNTER — CARDPULM VISIT (OUTPATIENT)
Dept: CARDIAC REHAB | Facility: HOSPITAL | Age: 59
End: 2024-09-18
Attending: INTERNAL MEDICINE
Payer: COMMERCIAL

## 2024-09-18 PROCEDURE — 93798 PHYS/QHP OP CAR RHAB W/ECG: CPT

## 2025-02-19 ENCOUNTER — LAB ENCOUNTER (OUTPATIENT)
Dept: LAB | Age: 60
End: 2025-02-19
Attending: INTERNAL MEDICINE
Payer: COMMERCIAL

## 2025-02-19 DIAGNOSIS — E78.01 FAMILIAL HYPERCHOLESTEROLEMIA: ICD-10-CM

## 2025-02-19 DIAGNOSIS — R53.83 FATIGUE: ICD-10-CM

## 2025-02-19 DIAGNOSIS — E78.2 MIXED HYPERLIPIDEMIA: ICD-10-CM

## 2025-02-19 DIAGNOSIS — Z95.1 POSTSURGICAL AORTOCORONARY BYPASS STATUS: ICD-10-CM

## 2025-02-19 DIAGNOSIS — R06.09 DYSPNEA ON EXERTION: Primary | ICD-10-CM

## 2025-02-19 LAB
ALBUMIN SERPL-MCNC: 4.8 G/DL (ref 3.2–4.8)
ALBUMIN/GLOB SERPL: 1.7 {RATIO} (ref 1–2)
ALP LIVER SERPL-CCNC: 64 U/L
ALT SERPL-CCNC: 15 U/L
ANION GAP SERPL CALC-SCNC: 7 MMOL/L (ref 0–18)
AST SERPL-CCNC: 21 U/L (ref ?–34)
BASOPHILS # BLD AUTO: 0.04 X10(3) UL (ref 0–0.2)
BASOPHILS NFR BLD AUTO: 0.9 %
BILIRUB SERPL-MCNC: 1.5 MG/DL (ref 0.3–1.2)
BUN BLD-MCNC: 14 MG/DL (ref 9–23)
CALCIUM BLD-MCNC: 10.3 MG/DL (ref 8.7–10.6)
CHLORIDE SERPL-SCNC: 103 MMOL/L (ref 98–112)
CHOLEST SERPL-MCNC: 241 MG/DL (ref ?–200)
CO2 SERPL-SCNC: 29 MMOL/L (ref 21–32)
CREAT BLD-MCNC: 0.8 MG/DL
EGFRCR SERPLBLD CKD-EPI 2021: 86 ML/MIN/1.73M2 (ref 60–?)
EOSINOPHIL # BLD AUTO: 0.09 X10(3) UL (ref 0–0.7)
EOSINOPHIL NFR BLD AUTO: 2.1 %
ERYTHROCYTE [DISTWIDTH] IN BLOOD BY AUTOMATED COUNT: 13.8 %
FASTING PATIENT LIPID ANSWER: YES
FASTING STATUS PATIENT QL REPORTED: YES
GLOBULIN PLAS-MCNC: 2.9 G/DL (ref 2–3.5)
GLUCOSE BLD-MCNC: 97 MG/DL (ref 70–99)
HCT VFR BLD AUTO: 40.9 %
HDLC SERPL-MCNC: 65 MG/DL (ref 40–59)
HGB BLD-MCNC: 13.9 G/DL
IMM GRANULOCYTES # BLD AUTO: 0.01 X10(3) UL (ref 0–1)
IMM GRANULOCYTES NFR BLD: 0.2 %
LDLC SERPL CALC-MCNC: 158 MG/DL (ref ?–100)
LYMPHOCYTES # BLD AUTO: 1.08 X10(3) UL (ref 1–4)
LYMPHOCYTES NFR BLD AUTO: 24.8 %
MCH RBC QN AUTO: 30.9 PG (ref 26–34)
MCHC RBC AUTO-ENTMCNC: 34 G/DL (ref 31–37)
MCV RBC AUTO: 90.9 FL
MONOCYTES # BLD AUTO: 0.64 X10(3) UL (ref 0.1–1)
MONOCYTES NFR BLD AUTO: 14.7 %
NEUTROPHILS # BLD AUTO: 2.5 X10 (3) UL (ref 1.5–7.7)
NEUTROPHILS # BLD AUTO: 2.5 X10(3) UL (ref 1.5–7.7)
NEUTROPHILS NFR BLD AUTO: 57.3 %
NONHDLC SERPL-MCNC: 176 MG/DL (ref ?–130)
OSMOLALITY SERPL CALC.SUM OF ELEC: 288 MOSM/KG (ref 275–295)
PLATELET # BLD AUTO: 303 10(3)UL (ref 150–450)
POTASSIUM SERPL-SCNC: 4.6 MMOL/L (ref 3.5–5.1)
PROT SERPL-MCNC: 7.7 G/DL (ref 5.7–8.2)
RBC # BLD AUTO: 4.5 X10(6)UL
SODIUM SERPL-SCNC: 139 MMOL/L (ref 136–145)
TRIGL SERPL-MCNC: 105 MG/DL (ref 30–149)
TSI SER-ACNC: 3.82 UIU/ML (ref 0.55–4.78)
VIT D+METAB SERPL-MCNC: 31.4 NG/ML (ref 30–100)
VLDLC SERPL CALC-MCNC: 20 MG/DL (ref 0–30)
WBC # BLD AUTO: 4.4 X10(3) UL (ref 4–11)

## 2025-02-19 PROCEDURE — 82306 VITAMIN D 25 HYDROXY: CPT

## 2025-02-19 PROCEDURE — 36415 COLL VENOUS BLD VENIPUNCTURE: CPT

## 2025-02-19 PROCEDURE — 84443 ASSAY THYROID STIM HORMONE: CPT

## 2025-02-19 PROCEDURE — 85025 COMPLETE CBC W/AUTO DIFF WBC: CPT

## 2025-02-19 PROCEDURE — 80061 LIPID PANEL: CPT

## 2025-02-19 PROCEDURE — 80053 COMPREHEN METABOLIC PANEL: CPT

## 2025-03-05 ENCOUNTER — TELEPHONE (OUTPATIENT)
Dept: INTERNAL MEDICINE CLINIC | Facility: CLINIC | Age: 60
End: 2025-03-05

## 2025-03-05 DIAGNOSIS — F32.A ANXIETY AND DEPRESSION: ICD-10-CM

## 2025-03-05 DIAGNOSIS — F41.9 ANXIETY AND DEPRESSION: ICD-10-CM

## 2025-03-05 RX ORDER — SERTRALINE HYDROCHLORIDE 100 MG/1
100 TABLET, FILM COATED ORAL DAILY
Qty: 90 TABLET | Refills: 1 | Status: SHIPPED | OUTPATIENT
Start: 2025-03-05

## 2025-03-05 NOTE — TELEPHONE ENCOUNTER
A refill request was received for:  Requested Prescriptions     Pending Prescriptions Disp Refills    SERTRALINE 100 MG Oral Tab [Pharmacy Med Name: Sertraline HCl Oral Tablet 100 MG] 30 tablet 0     Sig: TAKE 1 TABLET BY MOUTH EVERY DAY       Last refill date:   8/28/2024    Last office visit: 2/5/2024    Follow up due:  No future appointments.

## 2025-03-05 NOTE — TELEPHONE ENCOUNTER
Medication requested: SERTRALINE 100 MG Oral Tab       Is patient requesting 30 or 90 day supply:  90    Pharmacy name/location:    [id number] - [facility] [address] [place] [address] [phone], [phone]   [address] [place] [address]   Phone: [phone] Fax: [phone]   Hours: Not open 24 hours       LOV:  06/14/2024    Is the patient due for appointment: no (if so, please schedule)    Additional notes:  Scarlett is taking over med. Pt does not see Dr Lisa any more.

## 2025-03-14 ENCOUNTER — OFFICE VISIT (OUTPATIENT)
Dept: INTERNAL MEDICINE CLINIC | Facility: CLINIC | Age: 60
End: 2025-03-14

## 2025-03-14 VITALS
OXYGEN SATURATION: 98 % | HEIGHT: 67 IN | HEART RATE: 76 BPM | WEIGHT: 176 LBS | BODY MASS INDEX: 27.62 KG/M2 | TEMPERATURE: 97 F | SYSTOLIC BLOOD PRESSURE: 122 MMHG | RESPIRATION RATE: 18 BRPM | DIASTOLIC BLOOD PRESSURE: 76 MMHG

## 2025-03-14 DIAGNOSIS — Z00.00 ANNUAL PHYSICAL EXAM: Primary | ICD-10-CM

## 2025-03-14 DIAGNOSIS — E78.01 FAMILIAL HYPERCHOLESTEREMIA: ICD-10-CM

## 2025-03-14 DIAGNOSIS — Z95.1 S/P CABG (CORONARY ARTERY BYPASS GRAFT): ICD-10-CM

## 2025-03-14 PROBLEM — D50.0 IRON DEFICIENCY ANEMIA DUE TO CHRONIC BLOOD LOSS: Status: RESOLVED | Noted: 2018-05-30 | Resolved: 2025-03-14

## 2025-03-14 PROBLEM — E78.5 HYPERLIPIDEMIA: Status: RESOLVED | Noted: 2024-04-22 | Resolved: 2025-03-14

## 2025-03-14 PROBLEM — K64.8 INTERNAL HEMORRHOIDS: Status: RESOLVED | Noted: 2022-09-19 | Resolved: 2025-03-14

## 2025-03-14 PROBLEM — Z12.11 SPECIAL SCREENING FOR MALIGNANT NEOPLASM OF COLON: Status: RESOLVED | Noted: 2022-09-19 | Resolved: 2025-03-14

## 2025-03-14 PROBLEM — K57.30 DIVERTICULOSIS OF COLON: Status: RESOLVED | Noted: 2022-09-19 | Resolved: 2025-03-14

## 2025-03-14 PROBLEM — D62 ANEMIA DUE TO ACUTE BLOOD LOSS: Status: RESOLVED | Noted: 2024-04-22 | Resolved: 2025-03-14

## 2025-03-14 PROCEDURE — 99396 PREV VISIT EST AGE 40-64: CPT | Performed by: NURSE PRACTITIONER

## 2025-03-14 RX ORDER — ERGOCALCIFEROL 1.25 MG/1
50000 CAPSULE, LIQUID FILLED ORAL WEEKLY
COMMUNITY
Start: 2024-02-29

## 2025-03-14 NOTE — PROGRESS NOTES
Wellness Exam    CC: Patient is presenting for a wellness exam    HPI:   Concerns: overall feeling well. Discussed lab results. She is working with insurance and cards on Repatha approval     Pertinent Family History:   Family History   Problem Relation Age of Onset    Hypertension Father     Cancer Mother         multiple myeloma    Ovarian Cancer Sister 58        Gyne:   follows with Dr. Paulson   Health Maintenance   Topic Date Due    Pap Smear  10/05/2023            Denies pelvic pain, abnormal discharge or genital lesions.    Last mammogram:    Health Maintenance   Topic Date Due    Mammogram  2025      Regular self breast exam: yes.    Bone Health: Last DEXA: na.  Osteoporosis prevention: weight resistant exercise    Last colonoscopy:    Health Maintenance   Topic Date Due    Colorectal Cancer Screening  2027        Reported Health: Good.  Diet is general, exercise: routine.    Past Medical History:    Anxiety    Anxiety state    Arthritis    Bloating    Coronary atherosclerosis    Depression    Disorder of thyroid    Fatigue    Headache disorder    High cholesterol    Hyperlipidemia    Migraines    Night sweats    Pain in joints    Stress    Visual impairment    CONTACTS    Wears glasses    Weight gain     Past Surgical History:   Procedure Laterality Date    Lily biopsy stereo nodule 1 site left (cpt=19081) Left 2020    Lily localization wire 1 site left (cpt=19281) Left 2021    ADH      2002    Other      Dilation and curettage    [place] teeth removed      X4     Social History     Socioeconomic History    Marital status:    Tobacco Use    Smoking status: Former     Current packs/day: 0.00     Average packs/day: 0.5 packs/day for 3.0 years (1.5 ttl pk-yrs)     Types: Cigarettes     Start date: 1985     Quit date: 1988     Years since quittin.8    Smokeless tobacco: Never   Vaping Use    Vaping status: Never Used   Substance and Sexual Activity    Alcohol use: Yes      Alcohol/week: 0.0 - 3.0 standard drinks of alcohol     Comment: occ    Drug use: Yes     Comment: thc gummy   Other Topics Concern    Caffeine Concern No    Stress Concern Yes    Weight Concern No    Special Diet No    Exercise No    Seat Belt Yes     Social Drivers of Health     Food Insecurity: No Food Insecurity (4/18/2024)    Food Insecurity     Food Insecurity: Never true   Transportation Needs: No Transportation Needs (4/18/2024)    Transportation Needs     Lack of Transportation: No   Housing Stability: Low Risk  (4/18/2024)    Housing Stability     Housing Instability: No     Medications Ordered Prior to Encounter[1]    Review of Systems   Constitutional: Negative for fever, chills and fatigue.   HENT: Negative for hearing loss, congestion, sore throat and neck pain.    Eyes: Negative for pain and visual disturbance.   Respiratory: Negative for cough and shortness of breath.    Cardiovascular: Negative for chest pain and palpitations.   Gastrointestinal: Negative for nausea, vomiting, abdominal pain and diarrhea.   Genitourinary: Negative for urgency, frequency of urination, and abnormal vaginal bleeding.   Musculoskeletal: Negative for arthralgias and gait problem.   Skin: Negative for color change and rash.   Neurological: Negative for tremors, weakness and numbness.   Hematological: Negative for adenopathy. Does not bruise/bleed easily.   Psychiatric/Behavioral: Negative for confusion and agitation. The patient is not nervous/anxious.      /76   Pulse 76   Temp 97 °F (36.1 °C) (Temporal)   Resp 18   Ht 5' 7\" (1.702 m)   Wt 176 lb (79.8 kg)   SpO2 98%   BMI 27.57 kg/m²   Physical Exam   Constitutional: She is oriented to person, place, and time. She appears well-developed. No distress.   Head: Normocephalic and atraumatic.   Eyes: EOM are normal. Pupils are equal, round, and reactive to light. No scleral icterus. Fundoscopic exam: No hemorrhages, A/V nicking, exudates or papilledema.  ENT:  TM's clear, nose normal, no oropharyngeal exudates or tonsillar hypertrophy    Neck: Normal range of motion. No thyromegaly present.   Cardiovascular: Normal rate, regular rhythm and normal heart sounds.  No murmur or friction rub heard.  Pulmonary/Chest: Effort normal and breath sounds normal bilaterally. She has no wheezes or rales.   Breasts:defer to gyne  Abdominal: Soft. Bowel sounds are normal. There is no tenderness. No HSM.  Musculoskeletal: Normal range of motion. She exhibits no edema.   Lymphadenopathy: She has no cervical, supraclavicular, or axillary adenopathy.   : defer to gyne  Neurological: She is alert and oriented to person, place, and time. DTRs are +2 and symmetric. Cranial nerves grossly intact.  Skin: Skin is warm. No rash noted. No erythema, pallor or jaundice.   Psychiatric: She has a normal mood and affect and her behavior is normal.     Assessment and Plan:  Hilda Bello is a 60 year old female here for a wellness exam.  Age appropriate cancer screening, labs, safety, immunizations were discussed with the patient and ordered as follows:    Familial hypercholesteremia- following with cardiology, did not tolerate statin, continue repatha and zetia  CAD- s/p bypass   Dysthymia- continue zoloft    recommend regular cardiovascular and weight bearing exercise as well as a well-rounded diet.    Her 5 year prevention plan includes: annual physical and labs, 30 minutes exercise most days of week and heart healthy diet  Patient/Caregiver Education:  Patient/Caregiver Education: There are no barriers to learning. Medical education done.  Outcome: Patient verbalizes understanding.       Educated by: MATTHEW Guerin         [1]   Current Outpatient Medications on File Prior to Visit   Medication Sig Dispense Refill    ergocalciferol 1.25 MG (95233 UT) Oral Cap Take 1 capsule (50,000 Units total) by mouth once a week.      sertraline 100 MG Oral Tab TAKE 1 TABLET BY MOUTH EVERY DAY 90 tablet  1    Evolocumab (REPATHA SURECLICK) 140 MG/ML Subcutaneous Solution Auto-injector Inject 1 mL into the skin every 14 (fourteen) days. 2 mL 5    ALPRAZolam 0.25 MG Oral Tab Take 1 tablet (0.25 mg total) by mouth nightly as needed. 30 tablet 2    Evolocumab (REPATHA SURECLICK) 140 MG/ML Subcutaneous Solution Auto-injector Inject into the skin.      aspirin 81 MG Oral Tab EC Take 1 tablet (81 mg total) by mouth daily.      metoprolol succinate ER 25 MG Oral Tablet 24 Hr Take 1 tablet (25 mg total) by mouth daily. 30 tablet 11    ezetimibe 10 MG Oral Tab Take 1 tablet (10 mg total) by mouth nightly. 90 tablet 3     No current facility-administered medications on file prior to visit.

## 2025-05-30 ENCOUNTER — LAB ENCOUNTER (OUTPATIENT)
Dept: LAB | Age: 60
End: 2025-05-30
Attending: NURSE PRACTITIONER
Payer: COMMERCIAL

## 2025-05-30 ENCOUNTER — OFFICE VISIT (OUTPATIENT)
Dept: INTERNAL MEDICINE CLINIC | Facility: CLINIC | Age: 60
End: 2025-05-30

## 2025-05-30 VITALS
BODY MASS INDEX: 28.12 KG/M2 | DIASTOLIC BLOOD PRESSURE: 74 MMHG | HEART RATE: 82 BPM | TEMPERATURE: 97 F | SYSTOLIC BLOOD PRESSURE: 122 MMHG | WEIGHT: 179.19 LBS | OXYGEN SATURATION: 98 % | RESPIRATION RATE: 18 BRPM | HEIGHT: 67 IN

## 2025-05-30 DIAGNOSIS — F41.9 ANXIETY: ICD-10-CM

## 2025-05-30 DIAGNOSIS — G93.32 CHRONIC FATIGUE SYNDROME: Primary | ICD-10-CM

## 2025-05-30 DIAGNOSIS — R53.82 CHRONIC FATIGUE: ICD-10-CM

## 2025-05-30 DIAGNOSIS — G93.32 CHRONIC FATIGUE SYNDROME: ICD-10-CM

## 2025-05-30 DIAGNOSIS — R74.8 ELEVATED CK: ICD-10-CM

## 2025-05-30 LAB
ALBUMIN SERPL-MCNC: 4.7 G/DL (ref 3.2–4.8)
ALBUMIN/GLOB SERPL: 1.6 {RATIO} (ref 1–2)
ALP LIVER SERPL-CCNC: 64 U/L (ref 46–118)
ALT SERPL-CCNC: 17 U/L (ref 10–49)
ANION GAP SERPL CALC-SCNC: 9 MMOL/L (ref 0–18)
AST SERPL-CCNC: 23 U/L (ref ?–34)
BASOPHILS # BLD AUTO: 0.04 X10(3) UL (ref 0–0.2)
BASOPHILS NFR BLD AUTO: 0.8 %
BILIRUB SERPL-MCNC: 1.2 MG/DL (ref 0.3–1.2)
BUN BLD-MCNC: 16 MG/DL (ref 9–23)
CALCIUM BLD-MCNC: 10.1 MG/DL (ref 8.7–10.6)
CHLORIDE SERPL-SCNC: 104 MMOL/L (ref 98–112)
CK SERPL-CCNC: 185 U/L (ref 34–145)
CO2 SERPL-SCNC: 26 MMOL/L (ref 21–32)
CREAT BLD-MCNC: 0.84 MG/DL (ref 0.55–1.02)
EGFRCR SERPLBLD CKD-EPI 2021: 80 ML/MIN/1.73M2 (ref 60–?)
EOSINOPHIL # BLD AUTO: 0.11 X10(3) UL (ref 0–0.7)
EOSINOPHIL NFR BLD AUTO: 2.2 %
ERYTHROCYTE [DISTWIDTH] IN BLOOD BY AUTOMATED COUNT: 13.4 %
ESTRADIOL SERPL-MCNC: 23.2 PG/ML
FASTING STATUS PATIENT QL REPORTED: NO
FSH SERPL-ACNC: 129.8 MIU/ML
GLOBULIN PLAS-MCNC: 2.9 G/DL (ref 2–3.5)
GLUCOSE BLD-MCNC: 76 MG/DL (ref 70–99)
HCT VFR BLD AUTO: 40.6 % (ref 35–48)
HGB BLD-MCNC: 13.5 G/DL (ref 12–16)
IMM GRANULOCYTES # BLD AUTO: 0.01 X10(3) UL (ref 0–1)
IMM GRANULOCYTES NFR BLD: 0.2 %
IRON SATN MFR SERPL: 31 % (ref 15–50)
IRON SERPL-MCNC: 100 UG/DL (ref 50–170)
LYMPHOCYTES # BLD AUTO: 1.17 X10(3) UL (ref 1–4)
LYMPHOCYTES NFR BLD AUTO: 23.9 %
MCH RBC QN AUTO: 30.9 PG (ref 26–34)
MCHC RBC AUTO-ENTMCNC: 33.3 G/DL (ref 31–37)
MCV RBC AUTO: 92.9 FL (ref 80–100)
MONOCYTES # BLD AUTO: 0.66 X10(3) UL (ref 0.1–1)
MONOCYTES NFR BLD AUTO: 13.5 %
NEUTROPHILS # BLD AUTO: 2.9 X10 (3) UL (ref 1.5–7.7)
NEUTROPHILS # BLD AUTO: 2.9 X10(3) UL (ref 1.5–7.7)
NEUTROPHILS NFR BLD AUTO: 59.4 %
OSMOLALITY SERPL CALC.SUM OF ELEC: 288 MOSM/KG (ref 275–295)
PLATELET # BLD AUTO: 285 10(3)UL (ref 150–450)
POTASSIUM SERPL-SCNC: 4.3 MMOL/L (ref 3.5–5.1)
PROT SERPL-MCNC: 7.6 G/DL (ref 5.7–8.2)
RBC # BLD AUTO: 4.37 X10(6)UL (ref 3.8–5.3)
SODIUM SERPL-SCNC: 139 MMOL/L (ref 136–145)
TOTAL IRON BINDING CAPACITY: 325 UG/DL (ref 250–425)
TRANSFERRIN SERPL-MCNC: 251 MG/DL (ref 250–380)
TSI SER-ACNC: 3.01 UIU/ML (ref 0.55–4.78)
WBC # BLD AUTO: 4.9 X10(3) UL (ref 4–11)

## 2025-05-30 PROCEDURE — 80050 GENERAL HEALTH PANEL: CPT | Performed by: NURSE PRACTITIONER

## 2025-05-30 PROCEDURE — 83001 ASSAY OF GONADOTROPIN (FSH): CPT | Performed by: NURSE PRACTITIONER

## 2025-05-30 PROCEDURE — 83540 ASSAY OF IRON: CPT | Performed by: NURSE PRACTITIONER

## 2025-05-30 PROCEDURE — 83550 IRON BINDING TEST: CPT | Performed by: NURSE PRACTITIONER

## 2025-05-30 PROCEDURE — 99214 OFFICE O/P EST MOD 30 MIN: CPT | Performed by: NURSE PRACTITIONER

## 2025-05-30 PROCEDURE — 82550 ASSAY OF CK (CPK): CPT | Performed by: NURSE PRACTITIONER

## 2025-05-30 PROCEDURE — 82670 ASSAY OF TOTAL ESTRADIOL: CPT | Performed by: NURSE PRACTITIONER

## 2025-05-30 RX ORDER — BUPROPION HYDROCHLORIDE 150 MG/1
150 TABLET ORAL DAILY
Qty: 60 TABLET | Refills: 0 | Status: SHIPPED | OUTPATIENT
Start: 2025-05-30

## 2025-05-30 NOTE — PROGRESS NOTES
The following individual(s) verbally consented to be recorded using ambient AI listening technology and understand that they can each withdraw their consent to this listening technology at any point by asking the clinician to turn off or pause the recording:    Patient name: Hilda Bello  Additional names:  None

## 2025-05-30 NOTE — PROGRESS NOTES
HPI:      Patient ID: Hilda Bello is a 60 year old female.    Chief Complaint   Patient presents with    Fatigue    Muscle Pain       HPI  History of Present Illness  Hilda Bello is a 60 year old female with coronary artery disease who presents with fatigue and muscle pain.    She experiences increased fatigue over the past few weeks, impacting her work performance and concentration. Despite regular physical activity, including sessions at the Exercise  and walking, she feels very tired. She associates some of this fatigue with her return to work after unemployment.    Muscle pain is present, which she suspects is related to her medication. She is currently taking Zetia due to insurance issues preventing access to Repatha. She was unaware of Zetia's potential to cause muscle and joint pain, similar to her previous experience with statins.    She is considering dietary changes to address weight gain and improve symptoms. She uses a gummy for sleep and takes sertraline for anxiety and depression. Previously was on wellbutrin    Had D&C about 8 years ago, unsure of menopause    Review of Systems      Past Medical History[1]  Past Surgical History[2]  Family History[3]  Social Hx on file[4]     Current Medications[5]  Allergies:Allergies[6]    Lab Results   Component Value Date    GLU 97 02/19/2025    BUN 14 02/19/2025    BUNCREA 18.7 06/09/2021    CREATSERUM 0.80 02/19/2025    ANIONGAP 7 02/19/2025     02/01/2018    GFRNAA 85 04/15/2022    GFRAA 97 04/15/2022    CA 10.3 02/19/2025    OSMOCALC 288 02/19/2025    ALKPHO 64 02/19/2025    AST 21 02/19/2025    ALT 15 02/19/2025    BILT 1.5 (H) 02/19/2025    TP 7.7 02/19/2025    ALB 4.8 02/19/2025    GLOBULIN 2.9 02/19/2025     02/19/2025    K 4.6 02/19/2025     02/19/2025    CO2 29.0 02/19/2025     Lab Results   Component Value Date    WBC 4.4 02/19/2025    RBC 4.50 02/19/2025    HGB 13.9 02/19/2025    HCT 40.9 02/19/2025    MCV 90.9  02/19/2025    MCH 30.9 02/19/2025    MCHC 34.0 02/19/2025    RDW 13.8 02/19/2025    .0 02/19/2025     Lab Results   Component Value Date    CHOLEST 241 (H) 02/19/2025    TRIG 105 02/19/2025    HDL 65 (H) 02/19/2025     (H) 02/19/2025    VLDL 20 02/19/2025    TCHDLRATIO 3.00 02/01/2018    NONHDLC 176 (H) 02/19/2025     Lab Results   Component Value Date     04/15/2024    A1C 5.8 (H) 04/15/2024     Lab Results   Component Value Date    T4F 0.8 04/08/2024    TSH 3.817 02/19/2025     Lab Results   Component Value Date    VITD 31.4 02/19/2025     Lab Results   Component Value Date    ANUJA 51.8 12/26/2023     No results found for: \"FOLIC\", \"FOLATESER\", \"FOLATE\"  Lab Results   Component Value Date    IRON 110 04/03/2024     Lab Results   Component Value Date    B12 529 12/26/2023     No results found for: \"PHOS\", \"PHOSPHORUS\"  Lab Results   Component Value Date    MG 1.9 04/21/2024        PHYSICAL EXAM:   /74   Pulse 82   Temp 97 °F (36.1 °C) (Temporal)   Resp 18   Ht 5' 7\" (1.702 m)   Wt 179 lb 3.2 oz (81.3 kg)   SpO2 98%   BMI 28.07 kg/m²   Physical Exam  Physical Exam  GENERAL: Alert, cooperative, well developed, well nourished, no acute distress  CHEST: Clear to auscultation bilaterally, no wheezes, rhonchi, or crackles  CARDIOVASCULAR: Normal heart rate and rhythm, S1 and S2 normal without murmurs  ABDOMEN: Soft, non-tender, non-distended, without organomegaly, normal bowel sounds  EXTREMITIES: No cyanosis or edema  NEUROLOGICAL: Cranial nerves grossly intact, moves all extremities without gross motor or sensory deficit           ASSESSMENT/PLAN:   Diagnoses and all orders for this visit:    Chronic fatigue syndrome  -     Comp Metabolic Panel (14) [E]; Future  -     CK (Creatine Kinase) (Not Creatinine) (E); Future  -     TSH W Reflex To Free T4 [E]; Future  -     CBC With Differential With Platelet; Future  -     Iron And Tibc [E]; Future  -     Estradiol [E]; Future  -     FSH [E];  Future    Anxiety  -     buPROPion  MG Oral Tablet 24 Hr; Take 1 tablet (150 mg total) by mouth in the morning.      Assessment & Plan  Dysthymia  Persistent depressive symptoms with associated anxiety and lack of focus, potentially exacerbated by anxiety and PTSD following heart surgery. Reports fatigue and difficulty focusing. Previously benefited from bupropion.  - Prescribe bupropion 150 mg extended-release daily in the morning for 2 weeks, then increase to 300 mg if tolerated.  - Continue sertraline 100 mg daily.  - Follow up in 1 month to assess response to medication changes.    Hyperlipidemia  Unable to obtain Repatha due to insurance issues and has been off it for a couple of months. Continues ezetimibe despite experiencing muscle pain, possibly a side effect.  - Continue ezetimibe 10 mg nightly.  - Coordinate with cardiologist to address insurance issues and obtain Repatha.    Menopausal symptoms  Symptoms consistent with menopause, including lack of focus and weight gain. Amenorrhea for over five years.  - Order labs to check estrogen levels to assess menopausal status.  - Discuss potential use of over-the-counter supplements such as Estroven and lion's armando after lab results.    Arthralgia  Muscle and joint pain, possibly related to ezetimibe use. Arthritis and muscle pain during workouts.  - Consider trial of anti-inflammatory diet to help with muscle aches and fatigue.  - Encourage increased water intake to support overall health and muscle function.    Fatigue  Persistent fatigue, potentially multifactorial, including effects from workouts, potential allergies, and menopausal symptoms.  - Order labs to assess overall health status, excluding cholesterol levels.  - Recommend trial of Zyrtec at night for two weeks to assess for improvement in fatigue related to potential allergies.        There are no Patient Instructions on file for this visit.    KIMBERLEY Tapia              [1]   Past  Medical History:   Anxiety    Anxiety state    Arthritis    Bloating    Coronary atherosclerosis    Depression    Disorder of thyroid    Fatigue    Headache disorder    High cholesterol    Hyperlipidemia    Migraines    Night sweats    Pain in joints    Stress    Visual impairment    CONTACTS    Wears glasses    Weight gain   [2]   Past Surgical History:  Procedure Laterality Date    Lily biopsy stereo nodule 1 site left (cpt=19081) Left 2020    Lily localization wire 1 site left (cpt=19281) Left 2021    ADH      2002    Other      Dilation and curettage    [place] teeth removed      X4   [3]   Family History  Problem Relation Age of Onset    Hypertension Father     Cancer Mother         multiple myeloma    Ovarian Cancer Sister 58   [4]   Social History  Socioeconomic History    Marital status:    Tobacco Use    Smoking status: Former     Current packs/day: 0.00     Average packs/day: 0.5 packs/day for 3.0 years (1.5 ttl pk-yrs)     Types: Cigarettes     Start date: 1985     Quit date: 1988     Years since quittin.0    Smokeless tobacco: Never   Vaping Use    Vaping status: Never Used   Substance and Sexual Activity    Alcohol use: Yes     Alcohol/week: 0.0 - 3.0 standard drinks of alcohol     Comment: occ    Drug use: Yes     Comment: thc gummy   Other Topics Concern    Caffeine Concern No    Stress Concern Yes    Weight Concern No    Special Diet No    Exercise No    Seat Belt Yes   [5]   Current Outpatient Medications   Medication Sig Dispense Refill    buPROPion  MG Oral Tablet 24 Hr Take 1 tablet (150 mg total) by mouth in the morning. 60 tablet 0    ergocalciferol 1.25 MG (02846 UT) Oral Cap Take 1 capsule (50,000 Units total) by mouth once a week.      sertraline 100 MG Oral Tab TAKE 1 TABLET BY MOUTH EVERY DAY 90 tablet 1    ALPRAZolam 0.25 MG Oral Tab Take 1 tablet (0.25 mg total) by mouth nightly as needed. 30 tablet 2    aspirin 81 MG Oral Tab EC Take 1 tablet (81  mg total) by mouth daily.      metoprolol succinate ER 25 MG Oral Tablet 24 Hr Take 1 tablet (25 mg total) by mouth daily. 30 tablet 11    ezetimibe 10 MG Oral Tab Take 1 tablet (10 mg total) by mouth nightly. 90 tablet 3    Evolocumab (REPATHA SURECLICK) 140 MG/ML Subcutaneous Solution Auto-injector Inject 1 mL into the skin every 14 (fourteen) days. (Patient not taking: Reported on 5/30/2025) 2 mL 5    Evolocumab (REPATHA SURECLICK) 140 MG/ML Subcutaneous Solution Auto-injector Inject into the skin. (Patient not taking: Reported on 5/30/2025)     [6]   Allergies  Allergen Reactions    Statins MYALGIA

## 2025-06-02 LAB
CK TOTAL: 286 U/L
CK TOTAL: 286 U/L
CK-BB: 0 %
CK-BB: 0 %
CK-MB: 0 %
CK-MB: 0 %
CK-MM: 100 %
CK-MM: 100 %
MACRO TYPE 1: 0 %
MACRO TYPE 1: 0 %
MACRO TYPE 2: 0 %
MACRO TYPE 2: 0 %

## 2025-06-21 DIAGNOSIS — Z82.49 FAMILY HISTORY OF EARLY CAD: ICD-10-CM

## 2025-06-21 DIAGNOSIS — I25.10 CORONARY ARTERY DISEASE INVOLVING NATIVE CORONARY ARTERY OF NATIVE HEART WITHOUT ANGINA PECTORIS: Primary | ICD-10-CM

## 2025-06-21 DIAGNOSIS — Z95.1 HX OF CABG: ICD-10-CM

## 2025-06-21 DIAGNOSIS — M79.10 MYALGIA: Primary | ICD-10-CM

## 2025-06-21 DIAGNOSIS — E78.01 FAMILIAL HYPERCHOLESTEROLEMIA: ICD-10-CM

## 2025-06-21 DIAGNOSIS — Z78.9 STATIN INTOLERANCE: ICD-10-CM

## 2025-06-21 DIAGNOSIS — Z79.899 LONG TERM USE OF DRUG: ICD-10-CM

## 2025-06-21 DIAGNOSIS — M79.10 MYALGIA: ICD-10-CM

## 2025-06-27 ENCOUNTER — LAB ENCOUNTER (OUTPATIENT)
Dept: LAB | Age: 60
End: 2025-06-27
Attending: INTERNAL MEDICINE
Payer: COMMERCIAL

## 2025-06-27 DIAGNOSIS — I25.10 CORONARY ARTERY DISEASE INVOLVING NATIVE CORONARY ARTERY OF NATIVE HEART WITHOUT ANGINA PECTORIS: ICD-10-CM

## 2025-06-27 DIAGNOSIS — Z95.1 HX OF CABG: ICD-10-CM

## 2025-06-27 DIAGNOSIS — Z78.9 STATIN INTOLERANCE: ICD-10-CM

## 2025-06-27 DIAGNOSIS — M79.10 MYALGIA: ICD-10-CM

## 2025-06-27 DIAGNOSIS — E78.01 FAMILIAL HYPERCHOLESTEROLEMIA: ICD-10-CM

## 2025-06-27 DIAGNOSIS — Z82.49 FAMILY HISTORY OF EARLY CAD: ICD-10-CM

## 2025-06-27 DIAGNOSIS — Z79.899 LONG TERM USE OF DRUG: ICD-10-CM

## 2025-06-27 LAB
ALBUMIN SERPL-MCNC: 4.7 G/DL (ref 3.2–4.8)
ALBUMIN/GLOB SERPL: 1.7 {RATIO} (ref 1–2)
ALP LIVER SERPL-CCNC: 65 U/L (ref 46–118)
ALT SERPL-CCNC: 13 U/L (ref 10–49)
ANION GAP SERPL CALC-SCNC: 11 MMOL/L (ref 0–18)
AST SERPL-CCNC: 19 U/L (ref ?–34)
BILIRUB SERPL-MCNC: 0.8 MG/DL (ref 0.3–1.2)
BUN BLD-MCNC: 17 MG/DL (ref 9–23)
CALCIUM BLD-MCNC: 9.8 MG/DL (ref 8.7–10.6)
CHLORIDE SERPL-SCNC: 103 MMOL/L (ref 98–112)
CHOLEST SERPL-MCNC: 310 MG/DL (ref ?–200)
CK SERPL-CCNC: 91 U/L (ref 34–145)
CO2 SERPL-SCNC: 26 MMOL/L (ref 21–32)
CREAT BLD-MCNC: 1.01 MG/DL (ref 0.55–1.02)
CRP SERPL HS-MCNC: 1.03 MG/L (ref ?–3)
EGFRCR SERPLBLD CKD-EPI 2021: 65 ML/MIN/1.73M2 (ref 60–?)
ERYTHROCYTE [SEDIMENTATION RATE] IN BLOOD: 11 MM/HR (ref 0–30)
FASTING PATIENT LIPID ANSWER: YES
FASTING STATUS PATIENT QL REPORTED: YES
GLOBULIN PLAS-MCNC: 2.8 G/DL (ref 2–3.5)
GLUCOSE BLD-MCNC: 89 MG/DL (ref 70–99)
HDLC SERPL-MCNC: 61 MG/DL (ref 40–59)
LDLC SERPL CALC-MCNC: 230 MG/DL (ref ?–100)
NONHDLC SERPL-MCNC: 249 MG/DL (ref ?–130)
OSMOLALITY SERPL CALC.SUM OF ELEC: 291 MOSM/KG (ref 275–295)
POTASSIUM SERPL-SCNC: 4.3 MMOL/L (ref 3.5–5.1)
PROT SERPL-MCNC: 7.5 G/DL (ref 5.7–8.2)
SODIUM SERPL-SCNC: 140 MMOL/L (ref 136–145)
TRIGL SERPL-MCNC: 108 MG/DL (ref 30–149)
VLDLC SERPL CALC-MCNC: 24 MG/DL (ref 0–30)

## 2025-06-27 PROCEDURE — 82550 ASSAY OF CK (CPK): CPT

## 2025-06-27 PROCEDURE — 83695 ASSAY OF LIPOPROTEIN(A): CPT

## 2025-06-27 PROCEDURE — 36415 COLL VENOUS BLD VENIPUNCTURE: CPT

## 2025-06-27 PROCEDURE — 86141 C-REACTIVE PROTEIN HS: CPT

## 2025-06-27 PROCEDURE — 82172 ASSAY OF APOLIPOPROTEIN: CPT

## 2025-06-27 PROCEDURE — 80053 COMPREHEN METABOLIC PANEL: CPT

## 2025-06-27 PROCEDURE — 85652 RBC SED RATE AUTOMATED: CPT

## 2025-06-27 PROCEDURE — 80061 LIPID PANEL: CPT

## 2025-06-29 LAB — LIPOPROTEIN (A): 28.4 NMOL/L

## 2025-07-01 LAB — APOLIPOPROTEIN B: 161 MG/DL

## 2025-07-02 LAB
APOLIPOPROTEIN A-1: 151 MG/DL
APOLIPOPROTEIN A-1: 151 MG/DL

## 2025-08-20 ENCOUNTER — OFFICE VISIT (OUTPATIENT)
Dept: INTERNAL MEDICINE CLINIC | Facility: CLINIC | Age: 60
End: 2025-08-20

## 2025-08-20 VITALS
HEART RATE: 86 BPM | BODY MASS INDEX: 27.15 KG/M2 | SYSTOLIC BLOOD PRESSURE: 124 MMHG | HEIGHT: 67 IN | RESPIRATION RATE: 18 BRPM | TEMPERATURE: 97 F | DIASTOLIC BLOOD PRESSURE: 76 MMHG | OXYGEN SATURATION: 99 % | WEIGHT: 173 LBS

## 2025-08-20 DIAGNOSIS — H69.91 EUSTACHIAN TUBE DISORDER, RIGHT: Primary | ICD-10-CM

## 2025-08-20 DIAGNOSIS — Z12.31 ENCOUNTER FOR SCREENING MAMMOGRAM FOR MALIGNANT NEOPLASM OF BREAST: ICD-10-CM

## 2025-08-20 DIAGNOSIS — G44.209 TENSION HEADACHE: ICD-10-CM

## 2025-08-20 DIAGNOSIS — E78.01 FAMILIAL HYPERCHOLESTEREMIA: ICD-10-CM

## 2025-08-20 DIAGNOSIS — F41.1 GAD (GENERALIZED ANXIETY DISORDER): ICD-10-CM

## 2025-08-20 PROCEDURE — 99214 OFFICE O/P EST MOD 30 MIN: CPT | Performed by: NURSE PRACTITIONER

## 2025-08-21 ENCOUNTER — LAB ENCOUNTER (OUTPATIENT)
Dept: LAB | Age: 60
End: 2025-08-21
Attending: NURSE PRACTITIONER

## 2025-08-21 DIAGNOSIS — H69.91 EUSTACHIAN TUBE DISORDER, RIGHT: ICD-10-CM

## 2025-08-21 LAB
ALBUMIN SERPL-MCNC: 4.7 G/DL (ref 3.2–4.8)
ALBUMIN/GLOB SERPL: 1.7 (ref 1–2)
ALP LIVER SERPL-CCNC: 66 U/L (ref 46–118)
ALT SERPL-CCNC: 10 U/L (ref 10–49)
ANION GAP SERPL CALC-SCNC: 7 MMOL/L (ref 0–18)
AST SERPL-CCNC: 17 U/L (ref ?–34)
BASOPHILS # BLD AUTO: 0.05 X10(3) UL (ref 0–0.2)
BASOPHILS NFR BLD AUTO: 0.8 %
BILIRUB SERPL-MCNC: 0.8 MG/DL (ref 0.3–1.2)
BUN BLD-MCNC: 15 MG/DL (ref 9–23)
CALCIUM BLD-MCNC: 10.2 MG/DL (ref 8.7–10.6)
CHLORIDE SERPL-SCNC: 102 MMOL/L (ref 98–112)
CO2 SERPL-SCNC: 26 MMOL/L (ref 21–32)
CREAT BLD-MCNC: 0.93 MG/DL (ref 0.55–1.02)
EGFRCR SERPLBLD CKD-EPI 2021: 71 ML/MIN/1.73M2 (ref 60–?)
EOSINOPHIL # BLD AUTO: 0.08 X10(3) UL (ref 0–0.7)
EOSINOPHIL NFR BLD AUTO: 1.2 %
ERYTHROCYTE [DISTWIDTH] IN BLOOD BY AUTOMATED COUNT: 12.9 %
FASTING STATUS PATIENT QL REPORTED: YES
GLOBULIN PLAS-MCNC: 2.8 G/DL (ref 2–3.5)
GLUCOSE BLD-MCNC: 102 MG/DL (ref 70–99)
HCT VFR BLD AUTO: 41.7 % (ref 35–48)
HGB BLD-MCNC: 14.1 G/DL (ref 12–16)
IMM GRANULOCYTES # BLD AUTO: 0.01 X10(3) UL (ref 0–1)
IMM GRANULOCYTES NFR BLD: 0.2 %
LYMPHOCYTES # BLD AUTO: 1.73 X10(3) UL (ref 1–4)
LYMPHOCYTES NFR BLD AUTO: 26.5 %
MCH RBC QN AUTO: 30.9 PG (ref 26–34)
MCHC RBC AUTO-ENTMCNC: 33.8 G/DL (ref 31–37)
MCV RBC AUTO: 91.4 FL (ref 80–100)
MONOCYTES # BLD AUTO: 0.85 X10(3) UL (ref 0.1–1)
MONOCYTES NFR BLD AUTO: 13 %
NEUTROPHILS # BLD AUTO: 3.81 X10 (3) UL (ref 1.5–7.7)
NEUTROPHILS # BLD AUTO: 3.81 X10(3) UL (ref 1.5–7.7)
NEUTROPHILS NFR BLD AUTO: 58.3 %
OSMOLALITY SERPL CALC.SUM OF ELEC: 281 MOSM/KG (ref 275–295)
PLATELET # BLD AUTO: 300 10(3)UL (ref 150–450)
POTASSIUM SERPL-SCNC: 4.6 MMOL/L (ref 3.5–5.1)
PROT SERPL-MCNC: 7.5 G/DL (ref 5.7–8.2)
RBC # BLD AUTO: 4.56 X10(6)UL (ref 3.8–5.3)
SODIUM SERPL-SCNC: 135 MMOL/L (ref 136–145)
WBC # BLD AUTO: 6.5 X10(3) UL (ref 4–11)

## 2025-08-21 PROCEDURE — 36415 COLL VENOUS BLD VENIPUNCTURE: CPT

## 2025-08-21 PROCEDURE — 80053 COMPREHEN METABOLIC PANEL: CPT

## 2025-08-21 PROCEDURE — 85025 COMPLETE CBC W/AUTO DIFF WBC: CPT

## 2025-08-28 DIAGNOSIS — F41.9 ANXIETY AND DEPRESSION: ICD-10-CM

## 2025-08-28 DIAGNOSIS — F32.A ANXIETY AND DEPRESSION: ICD-10-CM

## 2025-08-29 RX ORDER — SERTRALINE HYDROCHLORIDE 100 MG/1
100 TABLET, FILM COATED ORAL DAILY
Qty: 90 TABLET | Refills: 0 | Status: SHIPPED | OUTPATIENT
Start: 2025-08-29

## (undated) DIAGNOSIS — F32.A ANXIETY AND DEPRESSION: ICD-10-CM

## (undated) DIAGNOSIS — M25.561 CHRONIC PAIN OF BOTH KNEES: ICD-10-CM

## (undated) DIAGNOSIS — E78.2 MIXED HYPERLIPIDEMIA: ICD-10-CM

## (undated) DIAGNOSIS — E03.9 HYPOTHYROIDISM, UNSPECIFIED TYPE: ICD-10-CM

## (undated) DIAGNOSIS — M25.562 CHRONIC PAIN OF BOTH KNEES: ICD-10-CM

## (undated) DIAGNOSIS — G89.29 CHRONIC PAIN OF BOTH KNEES: ICD-10-CM

## (undated) DIAGNOSIS — F41.9 ANXIETY AND DEPRESSION: ICD-10-CM

## (undated) DEVICE — OPEN HEART: Brand: MEDLINE INDUSTRIES, INC.

## (undated) DEVICE — LIGHT HANDLE

## (undated) DEVICE — KENDALL SCD EXPRESS SLEEVES, KNEE LENGTH, MEDIUM: Brand: KENDALL SCD

## (undated) DEVICE — SOL  .9 1000ML BTL

## (undated) DEVICE — STERILE POLYISOPRENE POWDER-FREE SURGICAL GLOVES: Brand: PROTEXIS

## (undated) DEVICE — LACTATED R INJ

## (undated) DEVICE — SYRINGE MED 30ML STD CLR PLAS LL TIP N CTRL

## (undated) DEVICE — SUTURE VICRYL 5-0 PS-5

## (undated) DEVICE — SUT POLYDEK 2-0 75CM NABSRB GRN

## (undated) DEVICE — BREAST-HERNIA-PORT CDS-LF: Brand: MEDLINE INDUSTRIES, INC.

## (undated) DEVICE — [HIGH FLOW INSUFFLATOR,  DO NOT USE IF PACKAGE IS DAMAGED,  KEEP DRY,  KEEP AWAY FROM SUNLIGHT,  PROTECT FROM HEAT AND RADIOACTIVE SOURCES.]: Brand: PNEUMOSURE

## (undated) DEVICE — SUT VCRL 2-0 36IN CT-1 ABSRB UD L36MM 1/2 CIR

## (undated) DEVICE — Device: Brand: VIRTUOSAPH PLUS WITH RADIAL INDICATION

## (undated) DEVICE — TRANSFER PACK CONTAINER 600 ML WITH COUPLER. STERILE FLUID PATH: Brand: FENWAL TRANSFER PACK CONTAINER 600 ML WITH COUPLER

## (undated) DEVICE — SUTURE SILK 0 FSL

## (undated) DEVICE — GLOVE SURG SENSICARE SZ 6-1/2

## (undated) DEVICE — SUT PROL 8-0 18IN BV130-5 NABSRB BLU 6.5MM 3/

## (undated) DEVICE — GOWN,SIRUS,FABRIC-REINFORCED,X-LARGE: Brand: MEDLINE

## (undated) DEVICE — SUT 6 SGL WIRE 14IN CCS-1 NABSRB L49MM 1/2 CI

## (undated) DEVICE — STERILE SYNTHETIC POLYISOPRENE POWDER-FREE SURGICAL GLOVES WITH HYDROGEL COATING: Brand: PROTEXIS

## (undated) DEVICE — SUT VCRL 3-0 27IN PS-1 ABSRB UD L24MM 3/8 CIR

## (undated) DEVICE — Device

## (undated) DEVICE — CABLE SUR L12IN SM CLP LNG DISP

## (undated) DEVICE — SOLUTION IRRIG 1000ML 0.9% NACL USP BTL

## (undated) DEVICE — GYN CDS: Brand: MEDLINE INDUSTRIES, INC.

## (undated) DEVICE — VIOLET BRAIDED (POLYGLACTIN 910), SYNTHETIC ABSORBABLE SUTURE: Brand: COATED VICRYL

## (undated) DEVICE — 3M(TM) MICROPORE TAPE DISPENSER 1535-2: Brand: 3M™ MICROPORE™

## (undated) DEVICE — CLIP INT L ORNG TI TRNSVRS GRV CHEVRON SHP

## (undated) DEVICE — GLOVE SUR 7 BIOGEL PI ORTHOPRO PIP BRN PWD F

## (undated) DEVICE — CELL SAVER RESERVOIR

## (undated) DEVICE — SUT POLYDEK 3-0 24IN NABSRB GRN

## (undated) DEVICE — CONT SPEC SURG FAXITRON WEDGE

## (undated) DEVICE — CABLE PT L10FT ELECTRD PIN DIA1-2MM WHT DISP

## (undated) DEVICE — Device: Brand: INTELLICART™

## (undated) NOTE — LETTER
10/09/19        [facility] [place] [address] [place] [address]      Dear Rafe Dakins,    [facility] records indicate that you have outstanding lab work and or testing that was ordered for you and has not yet been completed:  Orders Placed This Encounter

## (undated) NOTE — LETTER
01/29/20        [facility] [place] [place] [address]      Dear Cleve Ruano,    [address] records indicate that you have outstanding lab work and or testing that was ordered for you and has not yet been completed:  Orders Placed This Encounter

## (undated) NOTE — MR AVS SNAPSHOT
[address]  [address] [facility], [place] [id number] [phone]               Thank you for choosing us for your health care visit with Aditya Graham DO.   We are glad to serve you and happy to provide you with this khalil Eulalio Tejeda, [address] [address]   Phone:  [phone]   Fax:  [phone]         Referral Orders      Normal Orders This Visit    GASTRO - INTERNAL [[id number] CUSTOM]  Order #:  [id number]         **REFERRAL REQUEST**    Rita Jones view more details from this visit by going to [email or web address]. If you've recently had a stay at the Hospital you can access your discharge instructions in [employer] by going to Visits < Admission Summaries.  If you've been to the Emergency Depar priority on exercise in your life                    Visit [facility] online at  [email or web address]

## (undated) NOTE — LETTER
[facility]  Carli Brown 63 [address] [facility], [address] [place] [address]    Consent for Operation    Date: __________________    Time: _______________    1.  I authorize the performance upon Shahida Bello the following operation:    Procedure(s):  [facility] has [facility], the surgeon will obtain the original videotape. The hospital will not be responsible for storage or maintenance of this tape.     6. For the purpose of advancing medical education, I consent to the admittance of observers to t STATEMENTS REQUIRING INSERTION OR COMPLETION WERE FILLED IN.     Signature of Patient:   ___________________________    When the patient is a minor or mentally incompetent to give consent:  Signature of person authorized to consent for patient: ____________ supplements, and pills I can buy without a prescription (including street drugs/illegal medications). Failure to inform my anesthesiologist about these medicines may increase my risk of anesthetic complications.   · If I am allergic to anything or have had Anesthesiologist Signature     Date   Time  I have discussed the procedure and information above with the patient (or patient’s representative) and answered their questions. The patient or their representative has agreed to have anesthesia services.     ___

## (undated) NOTE — LETTER
Eleazar Lara M.D., F.A.C.S.  Hugo Colunga M.D., F.A.C.S.  Abhishek Flaherty M.D., F.A.C.S  Puneet Hanson M.D., F.A.C.S.   Lewis Gillis M.D., F.A.C.S.   Romina Concepcion M.D.  Juan Andrade M.D., F.A.C.S.  Carlo Andre M.D., F.A.C.S.  Carlo Loyola M.D., F.A.C.SChristopher Easton M.D., F.A.C.S.  Carlo Christina M.D. F.A.C.S.  Gal Oquendo M.D., F.A.C.S.   Atilio Parker M.D., F.A.C.S.  Robson Olson M.D., F.A.C.S., F.A.C.C. Michael Hay M.D., F.A.C.S.   Burt Bowles M.D., F.A.C.S.  Michael Mejias M.D., F.A.C.S.  Raffi Marina M.D., F.A.C.S.  GABY Lange M.D., F.A.C.S  GABY Mcpherson M.D., F.A.C.S.   Ashish Corrigan M.D., F.A.C.S.  GABY Reyes M.D. R. Anthony Perez-Tamayo, M.D. PhD.  GABY Solorzano M.D. F A TWILA Santana M.D.   Nathanael Mayer M.D.   Arvin Mooney M.D.  GABY Del Angel D.O., F.A.CChristopherS.  Burt Diaz M.D., F.A.C.S.  Raffi Viera M.D.        Welcome to [facility]    As you contemplate possible surgical treatment, it is very important to us that you understand fully what is being discussed, that all of your questions have been answered, and that your options for treatment have been fully explained.    To that end, on the following page we will ask you some questions to make certain that you understand everything which has been explained to you. Included in this understanding is that there are both surgical and nonsurgical treatments available for you, that you have options regarding where your care is given, and what doctors are involved in your care. Included in these options would, of course, be the option to elect for no treatment whatsoever. We especially want to be sure that you have had a chance to have all of your questions and  concerns answered. If there are any issues which have not been adequately addressed, we ask you to bring them forward so that we can thoroughly address them.    A patient who is fully informed and understands their condition and options for treatment, as well as potential adverse effects of treatment, is going to be a patient who receives the most benefit out of care rendered. Our goal in addition to providing excellent surgical care is to provide the necessary information to you and your family in order to make decisions which are appropriate relative to your own care.    Please take the time necessary to read and answer the questions on the next page. Again, if you have any questions, bring them forward and we will certainly address them.    Sincerely,    [facility]    _______________________  ____________________________________  Date:                                             Patient Signature  ________________________  Hilda Ferreira Eugenia  Witness Consent Form         Revised: 2015  Patient Name: Hilda Bello     : 3/6/1965                 Printed: 6/15/13 9:43 AM     Medical Record #: [id number]                Page: 1 of  2        [facility]  Supplemental Consent Form    A [facility]) surgeon has met with me and explained the matter of my illness, and what treatments might be available to improve my condition. As a result of that conversation, I understand the following:    A [facility] surgeon met with me and explained, in detail, the nature of my condition for which surgery is being contemplated. The procedure to be performed  Is: CORONARY ARTERY BYPASS GRAFT            Yes _____ No _____    A [facility] surgeon has explained to me that there are alternatives to surgery which might include no surgery, medical therapy, or interventional treatment, among other options and the risks and benefits of the different treatment options:    Yes  _____ No _____    A [facility] surgeon as explained to me that if I should so desire, he/she is willing to explain my case and the surgical and non-surgical options to family members:            Yes _____ No _____    A [facility] surgeon has answered all of my questions regarding the topics we have discussed. I have been invited to ask more questions:  Yes _____ No _____    A [facility] surgeon has explained to me that if I seek other options or wish treatment at another facility in [place] or [place], or anywhere in the [place], that his/her office will assist me in making such accommodations:   Yes _____ No _____    A [facility] surgeon has explained to me, that death, risk of bleeding, stroke, multi-organ failure, heart attack or other complications are risks for the proposed surgical procedure:           Yes _____ No _____    A [facility] surgeon has explained to me that I have the right to cancel or postpone the surgery at any time prior to the start of surgery:    Yes _____ No _____    The nature and options for treatment for my condition have been explained to me, in detail, by a [facility] surgeon and all questions have been answered to my satisfaction. I understand that I am not required to undergo surgery, and further, that if I so desire, I could have surgery accomplished by another surgeon or at another institution. I understand and accept that which has been explained to me. I am able to make my decisions knowingly and willfully based on the data.    ______________________   __________________________________  Date       Patient Signature  ______________________________ Hilda Ferreira Eugenia  Witness Consent Form   Patient Name: Hilda Bello     DATB: 3/6/1965                 Medical Record #: [id number]    Page: 2 of 2        Printed: April 12, 2024

## (undated) NOTE — Clinical Note
I had the pleasure of seeing Ivette Bello on 1/22/2021. Please see my attached note.     Kaleb Vaughn MD FACS  [facility]

## (undated) NOTE — Clinical Note
I had the pleasure of seeing Veda Ashley on 12/22/2020. Please see my attached note.     Lyubov Frankel MD FACS  [facility]

## (undated) NOTE — LETTER
[address]  [id number]  Consent for Procedure/Sedation  Date: ***         Time: ***    {[facility] ivs consent:7256}